# Patient Record
Sex: FEMALE | Race: BLACK OR AFRICAN AMERICAN | Employment: OTHER | URBAN - METROPOLITAN AREA
[De-identification: names, ages, dates, MRNs, and addresses within clinical notes are randomized per-mention and may not be internally consistent; named-entity substitution may affect disease eponyms.]

---

## 2017-10-12 ENCOUNTER — HOSPITAL ENCOUNTER (OUTPATIENT)
Dept: MAMMOGRAPHY | Age: 72
Discharge: HOME | End: 2017-10-12
Attending: FAMILY MEDICINE
Payer: MEDICARE

## 2017-10-12 DIAGNOSIS — Z12.31 VISIT FOR SCREENING MAMMOGRAM: ICD-10-CM

## 2017-10-12 PROCEDURE — 77067 SCR MAMMO BI INCL CAD: CPT

## 2018-11-07 ENCOUNTER — ANESTHESIA (OUTPATIENT)
Dept: ENDOSCOPY | Age: 73
End: 2018-11-07
Payer: MEDICARE

## 2018-11-07 ENCOUNTER — HOSPITAL ENCOUNTER (OUTPATIENT)
Age: 73
Setting detail: OUTPATIENT SURGERY
Discharge: HOME | End: 2018-11-07
Attending: INTERNAL MEDICINE | Admitting: INTERNAL MEDICINE
Payer: MEDICARE

## 2018-11-07 ENCOUNTER — ANESTHESIA EVENT (OUTPATIENT)
Dept: ENDOSCOPY | Age: 73
End: 2018-11-07
Payer: MEDICARE

## 2018-11-07 VITALS
RESPIRATION RATE: 21 BRPM | HEART RATE: 62 BPM | BODY MASS INDEX: 31.24 KG/M2 | HEIGHT: 55 IN | SYSTOLIC BLOOD PRESSURE: 116 MMHG | OXYGEN SATURATION: 100 % | TEMPERATURE: 97.9 F | WEIGHT: 135 LBS | DIASTOLIC BLOOD PRESSURE: 51 MMHG

## 2018-11-07 PROCEDURE — 74011250637 HC RX REV CODE- 250/637: Performed by: INTERNAL MEDICINE

## 2018-11-07 PROCEDURE — 74011250636 HC RX REV CODE- 250/636: Performed by: INTERNAL MEDICINE

## 2018-11-07 PROCEDURE — 74011250636 HC RX REV CODE- 250/636

## 2018-11-07 PROCEDURE — 76060000031 HC ANESTHESIA FIRST 0.5 HR: Performed by: INTERNAL MEDICINE

## 2018-11-07 PROCEDURE — 77030013992 HC SNR POLYP ENDOSC BSC -B: Performed by: INTERNAL MEDICINE

## 2018-11-07 PROCEDURE — 76040000019: Performed by: INTERNAL MEDICINE

## 2018-11-07 PROCEDURE — 88305 TISSUE EXAM BY PATHOLOGIST: CPT

## 2018-11-07 RX ORDER — LIDOCAINE HYDROCHLORIDE 20 MG/ML
INJECTION, SOLUTION EPIDURAL; INFILTRATION; INTRACAUDAL; PERINEURAL AS NEEDED
Status: DISCONTINUED | OUTPATIENT
Start: 2018-11-07 | End: 2018-11-07 | Stop reason: HOSPADM

## 2018-11-07 RX ORDER — NALOXONE HYDROCHLORIDE 0.4 MG/ML
0.4 INJECTION, SOLUTION INTRAMUSCULAR; INTRAVENOUS; SUBCUTANEOUS
Status: DISCONTINUED | OUTPATIENT
Start: 2018-11-07 | End: 2018-11-07 | Stop reason: HOSPADM

## 2018-11-07 RX ORDER — SODIUM CHLORIDE 0.9 % (FLUSH) 0.9 %
5-10 SYRINGE (ML) INJECTION EVERY 8 HOURS
Status: DISCONTINUED | OUTPATIENT
Start: 2018-11-07 | End: 2018-11-07 | Stop reason: HOSPADM

## 2018-11-07 RX ORDER — DEXTROMETHORPHAN/PSEUDOEPHED 2.5-7.5/.8
1.2 DROPS ORAL
Status: DISCONTINUED | OUTPATIENT
Start: 2018-11-07 | End: 2018-11-07 | Stop reason: HOSPADM

## 2018-11-07 RX ORDER — MIDAZOLAM HYDROCHLORIDE 1 MG/ML
.25-5 INJECTION, SOLUTION INTRAMUSCULAR; INTRAVENOUS
Status: CANCELLED | OUTPATIENT
Start: 2018-11-07 | End: 2018-11-07

## 2018-11-07 RX ORDER — FENTANYL CITRATE 50 UG/ML
50 INJECTION, SOLUTION INTRAMUSCULAR; INTRAVENOUS
Status: CANCELLED | OUTPATIENT
Start: 2018-11-07 | End: 2018-11-07

## 2018-11-07 RX ORDER — PROPOFOL 10 MG/ML
INJECTION, EMULSION INTRAVENOUS AS NEEDED
Status: DISCONTINUED | OUTPATIENT
Start: 2018-11-07 | End: 2018-11-07 | Stop reason: HOSPADM

## 2018-11-07 RX ORDER — NALOXONE HYDROCHLORIDE 0.4 MG/ML
0.4 INJECTION, SOLUTION INTRAMUSCULAR; INTRAVENOUS; SUBCUTANEOUS
Status: CANCELLED | OUTPATIENT
Start: 2018-11-07 | End: 2018-11-07

## 2018-11-07 RX ORDER — SODIUM CHLORIDE 9 MG/ML
75 INJECTION, SOLUTION INTRAVENOUS CONTINUOUS
Status: DISCONTINUED | OUTPATIENT
Start: 2018-11-07 | End: 2018-11-07 | Stop reason: HOSPADM

## 2018-11-07 RX ORDER — ATROPINE SULFATE 0.1 MG/ML
0.5 INJECTION INTRAVENOUS
Status: DISCONTINUED | OUTPATIENT
Start: 2018-11-07 | End: 2018-11-07 | Stop reason: HOSPADM

## 2018-11-07 RX ORDER — FLUMAZENIL 0.1 MG/ML
0.2 INJECTION INTRAVENOUS
Status: DISCONTINUED | OUTPATIENT
Start: 2018-11-07 | End: 2018-11-07 | Stop reason: HOSPADM

## 2018-11-07 RX ORDER — EPINEPHRINE 0.1 MG/ML
1 INJECTION INTRACARDIAC; INTRAVENOUS
Status: DISCONTINUED | OUTPATIENT
Start: 2018-11-07 | End: 2018-11-07 | Stop reason: HOSPADM

## 2018-11-07 RX ORDER — SODIUM CHLORIDE 0.9 % (FLUSH) 0.9 %
5-10 SYRINGE (ML) INJECTION AS NEEDED
Status: DISCONTINUED | OUTPATIENT
Start: 2018-11-07 | End: 2018-11-07 | Stop reason: HOSPADM

## 2018-11-07 RX ORDER — MIDAZOLAM HYDROCHLORIDE 1 MG/ML
.25-5 INJECTION, SOLUTION INTRAMUSCULAR; INTRAVENOUS
Status: DISCONTINUED | OUTPATIENT
Start: 2018-11-07 | End: 2018-11-07 | Stop reason: HOSPADM

## 2018-11-07 RX ORDER — FLUMAZENIL 0.1 MG/ML
0.2 INJECTION INTRAVENOUS
Status: CANCELLED | OUTPATIENT
Start: 2018-11-07 | End: 2018-11-07

## 2018-11-07 RX ADMIN — SIMETHICONE 80 MG: 20 SUSPENSION/ DROPS ORAL at 10:07

## 2018-11-07 RX ADMIN — SODIUM CHLORIDE 75 ML/HR: 900 INJECTION, SOLUTION INTRAVENOUS at 09:51

## 2018-11-07 RX ADMIN — PROPOFOL 200 MG: 10 INJECTION, EMULSION INTRAVENOUS at 10:15

## 2018-11-07 RX ADMIN — LIDOCAINE HYDROCHLORIDE 40 MG: 20 INJECTION, SOLUTION EPIDURAL; INFILTRATION; INTRACAUDAL; PERINEURAL at 09:53

## 2018-11-07 NOTE — H&P
Pre-endoscopy H and P The patient was seen and examined in the room/pre-op holding area. The airway was assessed and documented. The problem list, past medical history, and medications were reviewed. Patient Active Problem List  
Diagnosis Code  
 HTN (hypertension) I10  
 Depressive disorder, not elsewhere classified F32.9  PVD (peripheral vascular disease) ([facility]) I73.9  Nausea with vomiting R11.2  
 HTN (hypertension) I10  
 Chronic hyponatremia E87.1  Sinus bradycardia R00.1  MRSA infection within last 3 months Z86.14  
 Status post skin graft Z94.5 Social History Socioeconomic History  Marital status:  Spouse name: Not on file  Number of children: Not on file  Years of education: Not on file  Highest education level: Not on file Social Needs  Financial resource strain: Not on file  Food insecurity - worry: Not on file  Food insecurity - inability: Not on file  Transportation needs - medical: Not on file  Transportation needs - non-medical: Not on file Occupational History  Not on file Tobacco Use  Smoking status: Former Smoker Last attempt to quit: 7/16/2008 Years since quitting: 10.3  Smokeless tobacco: Never Used Substance and Sexual Activity  Alcohol use: No  
 Drug use: No  
 Sexual activity: Not on file Other Topics Concern  Not on file Social History Narrative  Not on file Past Medical History:  
Diagnosis Date  Arthritis   
 was in legs  Hypertension  Other ill-defined conditions(799.89)   
 extremety vascular disease  PAD [facility]peripheral artery disease) [facility]  PVD (peripheral vascular disease) ([facility] [facility] Prior to Admission Medications Prescriptions Last Dose Informant Patient Reported? Taking? GABAPENTIN (NEURONTIN PO) 11/6/2018 at Unknown time  Yes Yes Sig: Take 600 mg by mouth three (3) times daily. HYDROmorphone (DILAUDID) 4 mg tablet 11/6/2018 at Unknown time  No Yes Sig: Take 1 Tab by mouth every four (4) hours as needed for Pain. amlodipine (NORVASC) 10 mg tablet 11/6/2018 at Unknown time  No Yes Sig: Take 1 Tab by mouth daily. ferrous sulfate 325 mg (65 mg iron) tablet 11/6/2018 at Unknown time  Yes Yes Sig: Take 325 mg by mouth two (2) times daily (with meals). hydrALAZINE (APRESOLINE) 25 mg tablet 11/6/2018 at Unknown time  Yes Yes Sig: Take 25 mg by mouth two (2) times a day. hydrochlorothiazide (HYDRODIURIL) 25 mg tablet 11/6/2018 at Unknown time  No Yes Sig: Take 1 Tab by mouth daily. lisinopril (PRINIVIL, ZESTRIL) 40 mg tablet 11/6/2018 at Unknown time  Yes Yes Sig: Take 40 mg by mouth daily. potassium chloride (K-DUR) 20 mEq tablet 11/6/2018 at Unknown time  No Yes Sig: Take 1 Tab by mouth daily. Facility-Administered Medications: None The review of systems is:  Negative  for shortness of breath or chest pain The heart, lungs, and mental status were satisfactory for the administration of deep sedation and for the procedure. I discussed with the patient the objectives, risks, consequences and alternatives to the procedure.    
 
Collins Arias MD 
11/7/2018 
9:41 AM

## 2018-11-07 NOTE — ANESTHESIA POSTPROCEDURE EVALUATION
Procedure(s): 
COLONOSCOPY 
ENDOSCOPIC POLYPECTOMY. Anesthesia Post Evaluation Patient location during evaluation: PACU Note status: Adequate. Level of consciousness: responsive to verbal stimuli and sleepy but conscious Pain management: satisfactory to patient Airway patency: patent Anesthetic complications: no 
Cardiovascular status: acceptable Respiratory status: acceptable Hydration status: acceptable Comments: +Post-Anesthesia Evaluation and Assessment Patient: Jose Mccray MRN: [id number]  SSN: [id number] YOB: 1945  Age: 68 y.o. Sex: female Cardiovascular Function/Vital Signs /52   Pulse (!) 0   Temp 36.6 °C (97.9 °F)   Resp 14   Ht 4' 2\" (1.27 m)   Wt 61.2 kg (135 lb)   SpO2 100%   BMI 37.97 kg/m² Patient is status post Procedure(s): 
COLONOSCOPY 
ENDOSCOPIC POLYPECTOMY. Nausea/Vomiting: Controlled. Postoperative hydration reviewed and adequate. Pain: 
Pain Scale 1: Numeric (0 - 10) (11/07/18 1027) Pain Intensity 1: 0 (11/07/18 1027) Managed. Neurological Status: At baseline. Mental Status and Level of Consciousness: Arousable. Pulmonary Status:  
O2 Device: Room air (11/07/18 1037) Adequate oxygenation and airway patent. Complications related to anesthesia: None Post-anesthesia assessment completed. No concerns. Signed By: Nerissa Churchill MD  
 11/7/2018 Visit Vitals /52 Pulse (!) 0 Temp 36.6 °C (97.9 °F) Resp 14 Ht 4' 2\" (1.27 m) Wt 61.2 kg (135 lb) SpO2 100% BMI 37.97 kg/m²

## 2018-11-07 NOTE — PROCEDURES
Colonoscopy Procedure Note    Morales Ohms  1945  [id number]    Indications:  Please see below. Pre-operative Diagnosis: SCREENING    Post-operative Diagnosis: Sigmoid colon polyps, internal hemorhoids      : Vidal Multani. Alee Marie MD    Referring Provider: Beverley Llanos MD    Sedation:  MAC anesthesia Propofol        Procedure Details:    After detailed informed consent was obtained with all risks and benefits of procedure explained and preoperative exam completed, the patient was taken to the endoscopy suite and placed in the left lateral decubitus position. Upon sequential sedation as per above, a digital rectal exam was performed  And was normal.  The Olympus videocolonoscope  was inserted in the rectum and carefully advanced to the cecum, which was identified by the ileocecal valve and appendiceal orifice. The quality of preparation was fair. The colonoscope was slowly withdrawn with careful evaluation between folds. Retroflexion in the rectum was performed. Findings:   · Four sessile, 5-7 mm, erythematous looking polyps are removed with a cold snare from the sigmoid colon. · Mucous coating of some areas noted. · Cecum appears normal.  · There are small internal hemorrhoids with hypertrophic  anal papillae. Therapies:  4 complete polypectomy were performed using cold snare  and the polyps were  retrieved    Specimen:  Specimens were collected as described above and sent to pathology. Complications: None were encountered during the procedure. EBL:  None. Recommendations:     -Await pathology. -If adenoma is present, repeat colonoscopy in 3 years.  -High fiber diet. Naturally, for new bleeding, unexplained weight loss,change in bowel habits and anemia, an earlier colonoscopy should be considered. Benito Marie MD  11/7/2018  10:15 AM

## 2018-11-07 NOTE — DISCHARGE INSTRUCTIONS
[place] Office: [phone]    [facility]  [id number]  1945    EGD/COLONOSCOPY DISCHARGE INSTRUCTIONS  Discomfort:    redness at IV site- apply warm compress to area; if redness or soreness persist- contact your physician  Gaseous discomfort- walking, belching will help relieve any discomfort  You may not operate a vehicle for 12 hours  You may not engage in an occupation involving machinery or appliances for rest of today. You may not drink alcoholic beverages for at least 12 hours  Avoid making any critical decisions for at least 24 hour  DIET  You may resume your regular diet - however -  remember your colon is empty and a heavy meal will produce gas. Avoid these foods:  fried / greasy foods, excessive carbonated drinks or too much caffeine  MEDICATIONS   Regarding Aspirin or Nonsteroidal medications specifically, please see below. ACTIVITY  You may resume your normal daily activities. Spend the remainder of the day resting -  avoid any strenuous activity. CALL M.D. ANY SIGN OF   Increasing pain, nausea, vomiting  Abdominal distension (swelling)  New increased bleeding (oral or rectal)  Fever (chills)    You may not take any Advil, Aspirin, Ibuprofen, Motrin, Aleve, or Goodys for 7 days, ONLY  Tylenol as needed for pain. Follow-up Instructions:   Call  Benito Marie MD for any questions or concerns  Results of procedure / biopsy in 7 days   Telephone # [phone]      Follow-up Information    None

## 2018-11-07 NOTE — ROUTINE PROCESS
Heavenly Benjamin 
1945 
[id number] Situation: 
Verbal report received from: Taniya Lee Procedure: Procedure(s): 
COLONOSCOPY 
ENDOSCOPIC POLYPECTOMY Background: 
 
Preoperative diagnosis: SCREENING Postoperative diagnosis: Colon polyps, hemorrhoids :  Dr. Jenny Oliveira 
Assistant(s): Endoscopy Technician-1: Lalo Irving 
Endoscopy RN-1: Danuta Yun RN Specimens:  
ID Type Source Tests Collected by Time Destination 1 : Sigmoid colon polyp Preservative Sigmoid  Christophe Melo MD 11/7/2018 1004 Pathology H. Pylori  no Assessment: 
Intra-procedure medications Anesthesia gave intra-procedure sedation and medications, see anesthesia flow sheet yes Intravenous fluids: NS@ Sukumar Yaima Vital signs stable Abdominal assessment: round and soft Recommendation: 
Discharge patient per MD order. Family or Friend Liezth- daughter in law Permission to share finding with family or friend yes

## 2018-11-07 NOTE — ANESTHESIA PREPROCEDURE EVALUATION
Anesthetic History No history of anesthetic complications PONV Review of Systems / Medical History Patient summary reviewed, nursing notes reviewed and pertinent labs reviewed Pulmonary Within defined limits Neuro/Psych Within defined limits Psychiatric history Cardiovascular Within defined limits Hypertension Dysrhythmias PAD Exercise tolerance: >4 METS Comments: Hx SB  
GI/Hepatic/Renal 
Within defined limits Endo/Other Within defined limits Arthritis Other Findings Physical Exam 
 
Airway Mallampati: II 
TM Distance: 4 - 6 cm Neck ROM: normal range of motion Mouth opening: Normal 
 
 Cardiovascular Regular rate and rhythm,  S1 and S2 normal,  no murmur, click, rub, or gallop Dental 
 
Dentition: Full upper dentures Pulmonary Breath sounds clear to auscultation Abdominal 
GI exam deferred Other Findings Anesthetic Plan ASA: 3 Anesthesia type: general and total IV anesthesia Induction: Intravenous Anesthetic plan and risks discussed with: Patient Propofol MAC

## 2018-11-07 NOTE — PERIOP NOTES
Endoscope was pre-cleaned at the bedside immediately following procedure by [facility] ET.

## 2018-11-07 NOTE — PERIOP NOTES
Anesthesia reports 200mg Propofol, 40mg Lidocaine and 300mL NS given during procedure. Received report from anesthesia staff on vital signs and status of patient.

## 2019-09-12 ENCOUNTER — HOSPITAL ENCOUNTER (OUTPATIENT)
Dept: MAMMOGRAPHY | Age: 74
Discharge: HOME | End: 2019-09-12
Attending: FAMILY MEDICINE
Payer: MEDICARE

## 2019-09-12 DIAGNOSIS — Z12.39 BREAST SCREENING, UNSPECIFIED: ICD-10-CM

## 2019-09-12 PROCEDURE — 77067 SCR MAMMO BI INCL CAD: CPT

## 2020-05-08 ENCOUNTER — APPOINTMENT (OUTPATIENT)
Dept: GENERAL RADIOLOGY | Age: 75
DRG: 246 | End: 2020-05-08
Attending: EMERGENCY MEDICINE
Payer: MEDICARE

## 2020-05-08 ENCOUNTER — HOSPITAL ENCOUNTER (INPATIENT)
Age: 75
LOS: 3 days | Discharge: HOME | DRG: 246 | End: 2020-05-11
Attending: EMERGENCY MEDICINE | Admitting: INTERNAL MEDICINE
Payer: MEDICARE

## 2020-05-08 ENCOUNTER — APPOINTMENT (OUTPATIENT)
Dept: CT IMAGING | Age: 75
DRG: 246 | End: 2020-05-08
Attending: EMERGENCY MEDICINE
Payer: MEDICARE

## 2020-05-08 ENCOUNTER — APPOINTMENT (OUTPATIENT)
Dept: NON INVASIVE DIAGNOSTICS | Age: 75
DRG: 246 | End: 2020-05-08
Attending: INTERNAL MEDICINE
Payer: MEDICARE

## 2020-05-08 DIAGNOSIS — I21.09 ST ELEVATION MYOCARDIAL INFARCTION (STEMI) OF ANTERIOR WALL (HCC): ICD-10-CM

## 2020-05-08 DIAGNOSIS — I21.3 ST ELEVATION MYOCARDIAL INFARCTION (STEMI), UNSPECIFIED ARTERY (HCC): Primary | ICD-10-CM

## 2020-05-08 PROBLEM — I21.02 STEMI INVOLVING LEFT ANTERIOR DESCENDING CORONARY ARTERY (HCC): Status: ACTIVE | Noted: 2020-05-08

## 2020-05-08 LAB
ALBUMIN SERPL-MCNC: 3.6 G/DL (ref 3.5–5)
ALBUMIN/GLOB SERPL: 0.9 {RATIO} (ref 1.1–2.2)
ALP SERPL-CCNC: 60 U/L (ref 45–117)
ALT SERPL-CCNC: 36 U/L (ref 12–78)
ANION GAP SERPL CALC-SCNC: 10 MMOL/L (ref 5–15)
ARTERIAL PATENCY WRIST A: YES
AST SERPL-CCNC: 46 U/L (ref 15–37)
ATRIAL RATE: 96 BPM
AV VELOCITY RATIO: 0.78
BASE DEFICIT BLD-SCNC: 5 MMOL/L
BASOPHILS # BLD: 0 K/UL (ref 0–0.1)
BASOPHILS # BLD: 0 K/UL (ref 0–0.1)
BASOPHILS NFR BLD: 0 % (ref 0–1)
BASOPHILS NFR BLD: 0 % (ref 0–1)
BDY SITE: ABNORMAL
BILIRUB SERPL-MCNC: 0.4 MG/DL (ref 0.2–1)
BNP SERPL-MCNC: ABNORMAL PG/ML
BUN SERPL-MCNC: 28 MG/DL (ref 6–20)
BUN/CREAT SERPL: 23 (ref 12–20)
CA-I BLD-SCNC: 1.24 MMOL/L (ref 1.12–1.32)
CALCIUM SERPL-MCNC: 8.9 MG/DL (ref 8.5–10.1)
CALCULATED R AXIS, ECG10: -48 DEGREES
CALCULATED T AXIS, ECG11: 117 DEGREES
CHLORIDE SERPL-SCNC: 97 MMOL/L (ref 97–108)
CK MB CFR SERPL CALC: 3.6 % (ref 0–2.5)
CK MB SERPL-MCNC: 18.8 NG/ML (ref 5–25)
CK SERPL-CCNC: 518 U/L (ref 26–192)
CO2 SERPL-SCNC: 22 MMOL/L (ref 21–32)
COMMENT, HOLDF: NORMAL
CREAT SERPL-MCNC: 1.21 MG/DL (ref 0.55–1.02)
D DIMER PPP FEU-MCNC: 1.13 MG/L FEU (ref 0–0.65)
DIAGNOSIS, 93000: NORMAL
DIFFERENTIAL METHOD BLD: ABNORMAL
DIFFERENTIAL METHOD BLD: ABNORMAL
ECHO AV PEAK GRADIENT: 7.7 MMHG
ECHO AV PEAK VELOCITY: 138.72 CM/S
ECHO EST RA PRESSURE: 10 MMHG
ECHO LA AREA 4C: 20.6 CM2
ECHO LA MAJOR AXIS: 3.32 CM
ECHO LA VOL 2C: 83.19 ML (ref 22–52)
ECHO LA VOL 4C: 61.32 ML (ref 22–52)
ECHO LA VOL BP: 76.85 ML (ref 22–52)
ECHO LA VOL/BSA BIPLANE: 55.55 ML/M2 (ref 16–28)
ECHO LA VOLUME INDEX A2C: 60.13 ML/M2 (ref 16–28)
ECHO LA VOLUME INDEX A4C: 44.32 ML/M2 (ref 16–28)
ECHO LV EDV A2C: 107.7 ML
ECHO LV EDV A4C: 98.6 ML
ECHO LV EDV BP: 103.6 ML (ref 56–104)
ECHO LV EDV INDEX A4C: 71.3 ML/M2
ECHO LV EDV INDEX BP: 74.9 ML/M2
ECHO LV EDV NDEX A2C: 77.8 ML/M2
ECHO LV EDV TEICHHOLZ: 49.88 ML
ECHO LV EJECTION FRACTION A2C: 19 %
ECHO LV EJECTION FRACTION A4C: 35 %
ECHO LV EJECTION FRACTION BIPLANE: 27.5 % (ref 55–100)
ECHO LV ESV A2C: 87.1 ML
ECHO LV ESV A4C: 64.1 ML
ECHO LV ESV BP: 75.1 ML (ref 19–49)
ECHO LV ESV INDEX A2C: 63 ML/M2
ECHO LV ESV INDEX A4C: 46.3 ML/M2
ECHO LV ESV INDEX BP: 54.3 ML/M2
ECHO LV ESV TEICHHOLZ: 33.03 ML
ECHO LV INTERNAL DIMENSION DIASTOLIC MMODE: 4.28 CM
ECHO LV INTERNAL DIMENSION DIASTOLIC: 4.27 CM (ref 3.9–5.3)
ECHO LV INTERNAL DIMENSION SYSTOLIC MMODE: 3.57 CM
ECHO LV INTERNAL DIMENSION SYSTOLIC: 3.59 CM
ECHO LV IVSD MMODE: 1.49 CM
ECHO LV IVSD: 1.51 CM (ref 0.6–0.9)
ECHO LV IVSS MMODE: 1.72 CM
ECHO LV IVSS: 1.73 CM
ECHO LV MASS 2D: 313.8 G (ref 67–162)
ECHO LV MASS INDEX 2D: 226.8 G/M2 (ref 43–95)
ECHO LV POSTERIOR WALL DIASTOLIC MMODE: 1.52 CM
ECHO LV POSTERIOR WALL DIASTOLIC: 1.54 CM (ref 0.6–0.9)
ECHO LV POSTERIOR WALL SYSTOLIC: 1.68 CM
ECHO LVOT PEAK GRADIENT: 4.7 MMHG
ECHO LVOT PEAK VELOCITY: 108.07 CM/S
ECHO MV A VELOCITY: 74.5 CM/S
ECHO MV E DECELERATION TIME (DT): 106.8 MS
ECHO MV E VELOCITY: 93.35 CM/S
ECHO MV E/A RATIO: 1.25
ECHO MV REGURGITANT PEAK GRADIENT: 70.2 MMHG
ECHO MV REGURGITANT PEAK VELOCITY: 418.82 CM/S
ECHO PULMONARY ARTERY SYSTOLIC PRESSURE (PASP): 32 MMHG
ECHO RIGHT VENTRICULAR SYSTOLIC PRESSURE (RVSP): 32 MMHG
ECHO RV INTERNAL DIMENSION: 3.98 CM
ECHO TV REGURGITANT MAX VELOCITY: 234.48 CM/S
ECHO TV REGURGITANT PEAK GRADIENT: 22 MMHG
EOSINOPHIL # BLD: 0 K/UL (ref 0–0.4)
EOSINOPHIL # BLD: 0 K/UL (ref 0–0.4)
EOSINOPHIL NFR BLD: 0 % (ref 0–7)
EOSINOPHIL NFR BLD: 0 % (ref 0–7)
ERYTHROCYTE [DISTWIDTH] IN BLOOD BY AUTOMATED COUNT: 13.7 % (ref 11.5–14.5)
ERYTHROCYTE [DISTWIDTH] IN BLOOD BY AUTOMATED COUNT: 14.1 % (ref 11.5–14.5)
GAS FLOW.O2 O2 DELIVERY SYS: ABNORMAL L/MIN
GAS FLOW.O2 SETTING OXYMISER: 4 L/M
GLOBULIN SER CALC-MCNC: 4.1 G/DL (ref 2–4)
GLUCOSE BLD STRIP.AUTO-MCNC: 152 MG/DL (ref 65–100)
GLUCOSE SERPL-MCNC: 150 MG/DL (ref 65–100)
HCO3 BLD-SCNC: 21.9 MMOL/L (ref 22–26)
HCT VFR BLD AUTO: 27.1 % (ref 35–47)
HCT VFR BLD AUTO: 31.2 % (ref 35–47)
HGB BLD-MCNC: 10.5 G/DL (ref 11.5–16)
HGB BLD-MCNC: 9.3 G/DL (ref 11.5–16)
IMM GRANULOCYTES # BLD AUTO: 0 K/UL (ref 0–0.04)
IMM GRANULOCYTES # BLD AUTO: 0.1 K/UL (ref 0–0.04)
IMM GRANULOCYTES NFR BLD AUTO: 0 % (ref 0–0.5)
IMM GRANULOCYTES NFR BLD AUTO: 1 % (ref 0–0.5)
LACTATE SERPL-SCNC: 3.4 MMOL/L (ref 0.4–2)
LVFS 2D: 15.9 %
LVFS: 16.67 %
LVSV (MOD BI): 17.43 ML
LVSV (MOD SINGLE 4C): 21.05 ML
LVSV (MOD SINGLE): 12.59 ML
LVSV (TEICH): 16.85 ML
LYMPHOCYTES # BLD: 0.4 K/UL (ref 0.8–3.5)
LYMPHOCYTES # BLD: 1.2 K/UL (ref 0.8–3.5)
LYMPHOCYTES NFR BLD: 10 % (ref 12–49)
LYMPHOCYTES NFR BLD: 8 % (ref 12–49)
MAGNESIUM SERPL-MCNC: 1.8 MG/DL (ref 1.6–2.4)
MCH RBC QN AUTO: 24.3 PG (ref 26–34)
MCH RBC QN AUTO: 24.8 PG (ref 26–34)
MCHC RBC AUTO-ENTMCNC: 33.7 G/DL (ref 30–36.5)
MCHC RBC AUTO-ENTMCNC: 34.3 G/DL (ref 30–36.5)
MCV RBC AUTO: 70.9 FL (ref 80–99)
MCV RBC AUTO: 73.6 FL (ref 80–99)
MONOCYTES # BLD: 0.3 K/UL (ref 0–1)
MONOCYTES # BLD: 1.3 K/UL (ref 0–1)
MONOCYTES NFR BLD: 11 % (ref 5–13)
MONOCYTES NFR BLD: 5 % (ref 5–13)
MV DEC SLOPE: 8.74
NEUTS SEG # BLD: 4.8 K/UL (ref 1.8–8)
NEUTS SEG # BLD: 9.4 K/UL (ref 1.8–8)
NEUTS SEG NFR BLD: 78 % (ref 32–75)
NEUTS SEG NFR BLD: 87 % (ref 32–75)
NRBC # BLD: 0 K/UL (ref 0–0.01)
NRBC # BLD: 0 K/UL (ref 0–0.01)
NRBC BLD-RTO: 0 PER 100 WBC
NRBC BLD-RTO: 0 PER 100 WBC
P-R INTERVAL, ECG05: 156 MS
PCO2 BLD: 49.4 MMHG (ref 35–45)
PH BLD: 7.25 [PH] (ref 7.35–7.45)
PLATELET # BLD AUTO: 234 K/UL (ref 150–400)
PLATELET # BLD AUTO: 296 K/UL (ref 150–400)
PMV BLD AUTO: 11.1 FL (ref 8.9–12.9)
PMV BLD AUTO: 11.3 FL (ref 8.9–12.9)
PO2 BLD: 31 MMHG (ref 80–100)
POTASSIUM SERPL-SCNC: 4.4 MMOL/L (ref 3.5–5.1)
PROT SERPL-MCNC: 7.7 G/DL (ref 6.4–8.2)
PULMONARY ARTERY END DIASTOLIC PRESSURE: 16.4 MMHG
PULMONARY ARTERY MEAN PRESURE: 21.6 MMHG
PV END DIASTOLIC VELOCITY: 1.3 MMHG
Q-T INTERVAL, ECG07: 364 MS
QRS DURATION, ECG06: 100 MS
QTC CALCULATION (BEZET), ECG08: 459 MS
RBC # BLD AUTO: 3.82 M/UL (ref 3.8–5.2)
RBC # BLD AUTO: 4.24 M/UL (ref 3.8–5.2)
RBC MORPH BLD: ABNORMAL
SAMPLES BEING HELD,HOLD: NORMAL
SAO2 % BLD: 50 % (ref 92–97)
SERVICE CMNT-IMP: ABNORMAL
SODIUM SERPL-SCNC: 129 MMOL/L (ref 136–145)
SPECIMEN TYPE: ABNORMAL
TOTAL RESP. RATE, ITRR: 24
TROPONIN I SERPL-MCNC: 1.61 NG/ML
TROPONIN I SERPL-MCNC: 12.9 NG/ML
VENTRICULAR RATE, ECG03: 96 BPM
WBC # BLD AUTO: 12 K/UL (ref 3.6–11)
WBC # BLD AUTO: 5.5 K/UL (ref 3.6–11)

## 2020-05-08 PROCEDURE — C1769 GUIDE WIRE: HCPCS | Performed by: INTERNAL MEDICINE

## 2020-05-08 PROCEDURE — C1894 INTRO/SHEATH, NON-LASER: HCPCS | Performed by: INTERNAL MEDICINE

## 2020-05-08 PROCEDURE — 4A023N7 MEASUREMENT OF CARDIAC SAMPLING AND PRESSURE, LEFT HEART, PERCUTANEOUS APPROACH: ICD-10-PCS | Performed by: INTERNAL MEDICINE

## 2020-05-08 PROCEDURE — 77030008543 HC TBNG MON PRSS MRTM -A: Performed by: INTERNAL MEDICINE

## 2020-05-08 PROCEDURE — B2151ZZ FLUOROSCOPY OF LEFT HEART USING LOW OSMOLAR CONTRAST: ICD-10-PCS | Performed by: INTERNAL MEDICINE

## 2020-05-08 PROCEDURE — 87635 SARS-COV-2 COVID-19 AMP PRB: CPT

## 2020-05-08 PROCEDURE — 71045 X-RAY EXAM CHEST 1 VIEW: CPT

## 2020-05-08 PROCEDURE — 77030013797 HC KT TRNSDUC PRSSR EDWD -A: Performed by: INTERNAL MEDICINE

## 2020-05-08 PROCEDURE — 74011250636 HC RX REV CODE- 250/636

## 2020-05-08 PROCEDURE — 77030019569 HC BND COMPR RAD TERU -B: Performed by: INTERNAL MEDICINE

## 2020-05-08 PROCEDURE — 96374 THER/PROPH/DIAG INJ IV PUSH: CPT

## 2020-05-08 PROCEDURE — 74011000250 HC RX REV CODE- 250: Performed by: INTERNAL MEDICINE

## 2020-05-08 PROCEDURE — 87040 BLOOD CULTURE FOR BACTERIA: CPT

## 2020-05-08 PROCEDURE — C1874 STENT, COATED/COV W/DEL SYS: HCPCS | Performed by: INTERNAL MEDICINE

## 2020-05-08 PROCEDURE — 77030019698 HC SYR ANGI MDLON MRTM -A: Performed by: INTERNAL MEDICINE

## 2020-05-08 PROCEDURE — 93005 ELECTROCARDIOGRAM TRACING: CPT

## 2020-05-08 PROCEDURE — 99152 MOD SED SAME PHYS/QHP 5/>YRS: CPT | Performed by: INTERNAL MEDICINE

## 2020-05-08 PROCEDURE — 94761 N-INVAS EAR/PLS OXIMETRY MLT: CPT

## 2020-05-08 PROCEDURE — C1887 CATHETER, GUIDING: HCPCS | Performed by: INTERNAL MEDICINE

## 2020-05-08 PROCEDURE — B2111ZZ FLUOROSCOPY OF MULTIPLE CORONARY ARTERIES USING LOW OSMOLAR CONTRAST: ICD-10-PCS | Performed by: INTERNAL MEDICINE

## 2020-05-08 PROCEDURE — 84484 ASSAY OF TROPONIN QUANT: CPT

## 2020-05-08 PROCEDURE — 82962 GLUCOSE BLOOD TEST: CPT

## 2020-05-08 PROCEDURE — 74011000258 HC RX REV CODE- 258: Performed by: INTERNAL MEDICINE

## 2020-05-08 PROCEDURE — C1725 CATH, TRANSLUMIN NON-LASER: HCPCS | Performed by: INTERNAL MEDICINE

## 2020-05-08 PROCEDURE — 74011250636 HC RX REV CODE- 250/636: Performed by: EMERGENCY MEDICINE

## 2020-05-08 PROCEDURE — 65620000000 HC RM CCU GENERAL

## 2020-05-08 PROCEDURE — 82803 BLOOD GASES ANY COMBINATION: CPT

## 2020-05-08 PROCEDURE — 93306 TTE W/DOPPLER COMPLETE: CPT

## 2020-05-08 PROCEDURE — 96375 TX/PRO/DX INJ NEW DRUG ADDON: CPT

## 2020-05-08 PROCEDURE — 74011250636 HC RX REV CODE- 250/636: Performed by: INTERNAL MEDICINE

## 2020-05-08 PROCEDURE — 71275 CT ANGIOGRAPHY CHEST: CPT

## 2020-05-08 PROCEDURE — 99153 MOD SED SAME PHYS/QHP EA: CPT | Performed by: INTERNAL MEDICINE

## 2020-05-08 PROCEDURE — 77030004549 HC CATH ANGI DX PRF MRTM -A: Performed by: INTERNAL MEDICINE

## 2020-05-08 PROCEDURE — 36415 COLL VENOUS BLD VENIPUNCTURE: CPT

## 2020-05-08 PROCEDURE — 80053 COMPREHEN METABOLIC PANEL: CPT

## 2020-05-08 PROCEDURE — 74011636320 HC RX REV CODE- 636/320: Performed by: EMERGENCY MEDICINE

## 2020-05-08 PROCEDURE — 74011250637 HC RX REV CODE- 250/637: Performed by: INTERNAL MEDICINE

## 2020-05-08 PROCEDURE — 83735 ASSAY OF MAGNESIUM: CPT

## 2020-05-08 PROCEDURE — 83605 ASSAY OF LACTIC ACID: CPT

## 2020-05-08 PROCEDURE — 99285 EMERGENCY DEPT VISIT HI MDM: CPT

## 2020-05-08 PROCEDURE — 77030002996 HC SUT SLK J&J -A: Performed by: INTERNAL MEDICINE

## 2020-05-08 PROCEDURE — 92928 PRQ TCAT PLMT NTRAC ST 1 LES: CPT | Performed by: INTERNAL MEDICINE

## 2020-05-08 PROCEDURE — 93458 L HRT ARTERY/VENTRICLE ANGIO: CPT | Performed by: INTERNAL MEDICINE

## 2020-05-08 PROCEDURE — 85379 FIBRIN DEGRADATION QUANT: CPT

## 2020-05-08 PROCEDURE — 82550 ASSAY OF CK (CPK): CPT

## 2020-05-08 PROCEDURE — 74011250637 HC RX REV CODE- 250/637: Performed by: EMERGENCY MEDICINE

## 2020-05-08 PROCEDURE — 82553 CREATINE MB FRACTION: CPT

## 2020-05-08 PROCEDURE — 85347 COAGULATION TIME ACTIVATED: CPT

## 2020-05-08 PROCEDURE — 85025 COMPLETE CBC W/AUTO DIFF WBC: CPT

## 2020-05-08 PROCEDURE — 74011636320 HC RX REV CODE- 636/320: Performed by: INTERNAL MEDICINE

## 2020-05-08 PROCEDURE — 027236Z DILATION OF CORONARY ARTERY, THREE ARTERIES WITH THREE DRUG-ELUTING INTRALUMINAL DEVICES, PERCUTANEOUS APPROACH: ICD-10-PCS | Performed by: INTERNAL MEDICINE

## 2020-05-08 PROCEDURE — 77030030195 HC CATH ANGI DX PRF4 MRTM -A: Performed by: INTERNAL MEDICINE

## 2020-05-08 PROCEDURE — 83880 ASSAY OF NATRIURETIC PEPTIDE: CPT

## 2020-05-08 PROCEDURE — 36600 WITHDRAWAL OF ARTERIAL BLOOD: CPT

## 2020-05-08 DEVICE — STENT RONYX27512UX RESOLUTE ONYX 2.75X12
Type: IMPLANTABLE DEVICE | Status: FUNCTIONAL
Brand: RESOLUTE ONYX™

## 2020-05-08 DEVICE — STENT RONYX20008UX RESOLUTE ONYX 2.00X08
Type: IMPLANTABLE DEVICE | Status: FUNCTIONAL
Brand: RESOLUTE ONYX™

## 2020-05-08 DEVICE — STENT RONYX25030UX RESOLUTE ONYX 2.50X30
Type: IMPLANTABLE DEVICE | Status: FUNCTIONAL
Brand: RESOLUTE ONYX™

## 2020-05-08 DEVICE — STENT RONYX25012UX RESOLUTE ONYX 2.50X12
Type: IMPLANTABLE DEVICE | Status: FUNCTIONAL
Brand: RESOLUTE ONYX™

## 2020-05-08 RX ORDER — GABAPENTIN 300 MG/1
600 CAPSULE ORAL EVERY 8 HOURS
Status: DISCONTINUED | OUTPATIENT
Start: 2020-05-08 | End: 2020-05-11 | Stop reason: HOSPADM

## 2020-05-08 RX ORDER — VERAPAMIL HYDROCHLORIDE 2.5 MG/ML
INJECTION, SOLUTION INTRAVENOUS AS NEEDED
Status: DISCONTINUED | OUTPATIENT
Start: 2020-05-08 | End: 2020-05-08 | Stop reason: HOSPADM

## 2020-05-08 RX ORDER — HEPARIN SODIUM 1000 [USP'U]/ML
INJECTION, SOLUTION INTRAVENOUS; SUBCUTANEOUS AS NEEDED
Status: DISCONTINUED | OUTPATIENT
Start: 2020-05-08 | End: 2020-05-08 | Stop reason: HOSPADM

## 2020-05-08 RX ORDER — ACETAMINOPHEN 650 MG/1
650 SUPPOSITORY RECTAL
Status: DISCONTINUED | OUTPATIENT
Start: 2020-05-08 | End: 2020-05-08

## 2020-05-08 RX ORDER — ZOLPIDEM TARTRATE 5 MG/1
5 TABLET ORAL
Status: DISCONTINUED | OUTPATIENT
Start: 2020-05-08 | End: 2020-05-11 | Stop reason: HOSPADM

## 2020-05-08 RX ORDER — MIDAZOLAM HYDROCHLORIDE 1 MG/ML
INJECTION, SOLUTION INTRAMUSCULAR; INTRAVENOUS AS NEEDED
Status: DISCONTINUED | OUTPATIENT
Start: 2020-05-08 | End: 2020-05-08 | Stop reason: HOSPADM

## 2020-05-08 RX ORDER — ATORVASTATIN CALCIUM 40 MG/1
40 TABLET, FILM COATED ORAL DAILY
COMMUNITY

## 2020-05-08 RX ORDER — MORPHINE SULFATE 2 MG/ML
4 INJECTION, SOLUTION INTRAMUSCULAR; INTRAVENOUS
Status: COMPLETED | OUTPATIENT
Start: 2020-05-08 | End: 2020-05-08

## 2020-05-08 RX ORDER — CLOPIDOGREL BISULFATE 75 MG/1
75 TABLET ORAL DAILY
Status: DISCONTINUED | OUTPATIENT
Start: 2020-05-09 | End: 2020-05-11 | Stop reason: HOSPADM

## 2020-05-08 RX ORDER — HEPARIN SODIUM 200 [USP'U]/100ML
INJECTION, SOLUTION INTRAVENOUS
Status: COMPLETED | OUTPATIENT
Start: 2020-05-08 | End: 2020-05-08

## 2020-05-08 RX ORDER — SODIUM CHLORIDE 0.9 % (FLUSH) 0.9 %
5-40 SYRINGE (ML) INJECTION AS NEEDED
Status: DISCONTINUED | OUTPATIENT
Start: 2020-05-08 | End: 2020-05-11 | Stop reason: HOSPADM

## 2020-05-08 RX ORDER — FENTANYL CITRATE 50 UG/ML
INJECTION, SOLUTION INTRAMUSCULAR; INTRAVENOUS AS NEEDED
Status: DISCONTINUED | OUTPATIENT
Start: 2020-05-08 | End: 2020-05-08 | Stop reason: HOSPADM

## 2020-05-08 RX ORDER — SODIUM CHLORIDE 0.9 % (FLUSH) 0.9 %
5-40 SYRINGE (ML) INJECTION EVERY 8 HOURS
Status: DISCONTINUED | OUTPATIENT
Start: 2020-05-08 | End: 2020-05-11 | Stop reason: HOSPADM

## 2020-05-08 RX ORDER — LIDOCAINE HYDROCHLORIDE 10 MG/ML
INJECTION, SOLUTION EPIDURAL; INFILTRATION; INTRACAUDAL; PERINEURAL AS NEEDED
Status: DISCONTINUED | OUTPATIENT
Start: 2020-05-08 | End: 2020-05-08 | Stop reason: HOSPADM

## 2020-05-08 RX ORDER — LORAZEPAM 1 MG/1
1 TABLET ORAL
Status: DISCONTINUED | OUTPATIENT
Start: 2020-05-08 | End: 2020-05-11 | Stop reason: HOSPADM

## 2020-05-08 RX ORDER — HEPARIN SODIUM 5000 [USP'U]/ML
INJECTION, SOLUTION INTRAVENOUS; SUBCUTANEOUS
Status: COMPLETED
Start: 2020-05-08 | End: 2020-05-08

## 2020-05-08 RX ORDER — NITROGLYCERIN 0.4 MG/1
0.4 TABLET SUBLINGUAL ONCE
Status: DISCONTINUED | OUTPATIENT
Start: 2020-05-08 | End: 2020-05-08

## 2020-05-08 RX ORDER — ACETAMINOPHEN 325 MG/1
650 TABLET ORAL
Status: DISCONTINUED | OUTPATIENT
Start: 2020-05-08 | End: 2020-05-08

## 2020-05-08 RX ORDER — ASPIRIN 325 MG
325 TABLET ORAL
Status: COMPLETED | OUTPATIENT
Start: 2020-05-08 | End: 2020-05-08

## 2020-05-08 RX ORDER — GUAIFENESIN 100 MG/5ML
81 LIQUID (ML) ORAL DAILY
Status: DISCONTINUED | OUTPATIENT
Start: 2020-05-08 | End: 2020-05-11 | Stop reason: HOSPADM

## 2020-05-08 RX ORDER — HEPARIN SODIUM 5000 [USP'U]/ML
5000 INJECTION, SOLUTION INTRAVENOUS; SUBCUTANEOUS
Status: COMPLETED | OUTPATIENT
Start: 2020-05-08 | End: 2020-05-08

## 2020-05-08 RX ORDER — SODIUM CHLORIDE 0.9 % (FLUSH) 0.9 %
10 SYRINGE (ML) INJECTION
Status: ACTIVE | OUTPATIENT
Start: 2020-05-08 | End: 2020-05-08

## 2020-05-08 RX ORDER — AMIODARONE HYDROCHLORIDE 150 MG/3ML
INJECTION, SOLUTION INTRAVENOUS AS NEEDED
Status: DISCONTINUED | OUTPATIENT
Start: 2020-05-08 | End: 2020-05-08 | Stop reason: HOSPADM

## 2020-05-08 RX ORDER — ATORVASTATIN CALCIUM 40 MG/1
40 TABLET, FILM COATED ORAL DAILY
Status: DISCONTINUED | OUTPATIENT
Start: 2020-05-08 | End: 2020-05-11 | Stop reason: HOSPADM

## 2020-05-08 RX ORDER — ALBUTEROL SULFATE 0.83 MG/ML
5 SOLUTION RESPIRATORY (INHALATION)
Status: DISCONTINUED | OUTPATIENT
Start: 2020-05-08 | End: 2020-05-08

## 2020-05-08 RX ORDER — ATORVASTATIN CALCIUM 40 MG/1
40 TABLET, FILM COATED ORAL
Status: DISCONTINUED | OUTPATIENT
Start: 2020-05-08 | End: 2020-05-08

## 2020-05-08 RX ORDER — CLOPIDOGREL BISULFATE 75 MG/1
TABLET ORAL AS NEEDED
Status: DISCONTINUED | OUTPATIENT
Start: 2020-05-08 | End: 2020-05-08 | Stop reason: HOSPADM

## 2020-05-08 RX ORDER — FUROSEMIDE 10 MG/ML
INJECTION INTRAMUSCULAR; INTRAVENOUS AS NEEDED
Status: DISCONTINUED | OUTPATIENT
Start: 2020-05-08 | End: 2020-05-08 | Stop reason: HOSPADM

## 2020-05-08 RX ADMIN — ATORVASTATIN CALCIUM 40 MG: 40 TABLET, FILM COATED ORAL at 14:43

## 2020-05-08 RX ADMIN — CEFTRIAXONE SODIUM 1 G: 1 INJECTION, POWDER, FOR SOLUTION INTRAMUSCULAR; INTRAVENOUS at 14:44

## 2020-05-08 RX ADMIN — HEPARIN SODIUM 5000 UNITS: 5000 INJECTION, SOLUTION INTRAVENOUS; SUBCUTANEOUS at 07:56

## 2020-05-08 RX ADMIN — PHENYLEPHRINE HYDROCHLORIDE 30 MCG/MIN: 10 INJECTION INTRAVENOUS at 09:36

## 2020-05-08 RX ADMIN — GABAPENTIN 600 MG: 300 CAPSULE ORAL at 14:43

## 2020-05-08 RX ADMIN — HEPARIN SODIUM 5000 UNITS: 5000 INJECTION INTRAVENOUS; SUBCUTANEOUS at 07:56

## 2020-05-08 RX ADMIN — MORPHINE SULFATE 4 MG: 2 INJECTION, SOLUTION INTRAMUSCULAR; INTRAVENOUS at 07:19

## 2020-05-08 RX ADMIN — ASPIRIN 325 MG: 325 TABLET, FILM COATED ORAL at 07:21

## 2020-05-08 RX ADMIN — IOPAMIDOL 57 ML: 755 INJECTION, SOLUTION INTRAVENOUS at 07:31

## 2020-05-08 RX ADMIN — ASPIRIN 81 MG 81 MG: 81 TABLET ORAL at 14:43

## 2020-05-08 RX ADMIN — GABAPENTIN 600 MG: 300 CAPSULE ORAL at 21:09

## 2020-05-08 RX ADMIN — Medication 10 ML: at 14:57

## 2020-05-08 RX ADMIN — PHENYLEPHRINE HYDROCHLORIDE 30 MCG/MIN: 10 INJECTION INTRAVENOUS at 09:02

## 2020-05-08 RX ADMIN — Medication 10 ML: at 21:09

## 2020-05-08 RX ADMIN — AZITHROMYCIN MONOHYDRATE 500 MG: 500 INJECTION, POWDER, LYOPHILIZED, FOR SOLUTION INTRAVENOUS at 14:49

## 2020-05-08 NOTE — H&P
Consult/Admission    NAME: Marino Sandhoff   :  1945   MRN:  [id number]     Date/Time:  2020 2:53 PM    Patient PCP: Mike Angelo MD  ________________________________________________________________________     Assessment:     Anterior STEMI   Multivessel CAD   Cardiogenic shock , requiring pressor support. Peripheral Artery Disease   S/p R AKA   S/p L BKA   Pulmonary edema   Possible pneumonia. Possible Covid infection   Acute respiratory failure with hypoxemia  Former Smoker. PCI with Onoyx LATONYA LAD   PCI with Onxy LATONYA  CX   PCI with Martinez LATONYA RCA           Plan:     Acute coronary intervention   Respiratory support. Seb Synephrine  Support for BP     Consult Pulmonary . [x]           High complexity decision making was performed        Subjective:   CHIEF COMPLAINT:  SOB , chest tightness. HISTORY OF PRESENT ILLNESS:     Brittney Strong is a 76 y.o.  female who came to ER from home by EMS . Acute onset of severe SOB , coughing , and tightness in the chest.   Hx of PAD , with previous vascular surgery legs, with subsequent L BKA and R AKA . Dr Alyssia Alejandra . No hx of heart disease. No hx of Diabetes     Initial EKG shows ST elevation in V1 and V2 , with subsequent improvement on repeat EKG . Patient is a very poor historian, it is very difficult to get an understand ible story from patient . Discussed with son who could not provide much additioinal information . CTA of chest to r/o PE was done , No PE. Does have pulmonary edema and pleural effusions. Elevated Troponin and CK with MB . Tested for Covid infection with nasal swab test , result pending . Needs to go to cath lab for urgent angio and intervention . Prior cardiac history is notable for:  Functional capacity:    Currently    We were asked to admit for work up and evaluation of the above problems.      Past Medical History:   Diagnosis Date    Arthritis was in legs    Hypertension     Other ill-defined conditions(799.89)     extremety vascular disease    [facility]     PVD (peripheral vascular disease) ([facility]       Past Surgical History:   Procedure Laterality Date    COLONOSCOPY N/A 2018    COLONOSCOPY performed by Carla Curry MD at \[facility]"" ENDOSCOPY    HX AMPUTATION      Right Above Knee Amputation    HX AMPUTATION  2011    Left Below Knee Amputation    HX ORTHOPAEDIC  12     Debridement Left BKA Stump and Placement of Wound VA    HX OTHER SURGICAL  10/24/12    SPLIT THICKNESS SKIN GRAFT FROM RIGHT THIGH TO BELOW KNEE AMPUTATION STUMP (to wound left BKA stump    VEIN BYPASS GRAFT,FEM-TIBIAL  3/04/2011    Left femoral post. tibial bypass with vein     Allergies   Allergen Reactions    Percocet [Oxycodone-Acetaminophen] Nausea and Vomiting      Meds:  See below  Social History     Tobacco Use    Smoking status: Former Smoker     Last attempt to quit: 2008     Years since quittin.8    Smokeless tobacco: Never Used   Substance Use Topics    Alcohol use: No      Family History   Problem Relation Age of Onset    Hypertension Mother     Hypertension Father     Diabetes Sister     Diabetes Brother        REVIEW OF SYSTEMS:     []            Unable to obtain  ROS due to ---   [x]            Total of 12 systems reviewed as follows:    Constitutional: negative fever, negative chills, negative weight loss  Eyes:   negative visual changes  ENT:   negative sore throat, tongue or lip swelling  Respiratory:  negative cough, negative dyspnea  Cards:  negative for chest pain, palpitations, lower extremity edema  GI:   negative for nausea, vomiting, diarrhea, and abdominal pain  Genitourinary: negative for frequency, dysuria  Integument:  negative for rash   Hematologic:  negative for easy bruising and gum/nose bleeding  Musculoskel: negative for myalgias,  back pain  Neurological:  negative for headaches, dizziness, vertigo, weakness  Behavl/Psych: negative for feelings of anxiety, depression     Pertinent Positives include :    Objective:      Physical Exam:    Last 24hrs VS reviewed since prior progress note. Most recent are:    Visit Vitals  /71   Pulse 87   Temp 98.9 °F (37.2 °C)   Resp 24   LMP  (LMP Unknown)   SpO2 100%       Intake/Output Summary (Last 24 hours) at 5/8/2020 1453  Last data filed at 5/8/2020 1100  Gross per 24 hour   Intake 0 ml   Output    Net 0 ml        General Appearance: Well developed, well nourished, alert & oriented x 3,    no acute distress. Ears/Nose/Mouth/Throat: Pupils equal and round, Hearing grossly normal.  Neck: Supple. JVP within normal limits. Carotids good upstrokes, with no bruit. Chest: Lungs clear to auscultation bilaterally. Cardiovascular: Regular rate and rhythm, S1S2 normal, no murmur, rubs, gallops. Abdomen: Soft, non-tender, bowel sounds are active. No organomegaly. Extremities: No edema bilaterally. Femoral pulses +2, Distal Pulses +1. Skin: Warm and dry. Neuro: CN II-XII grossly intact, Strength and sensation grossly intact. Data:      Prior to Admission medications    Medication Sig Start Date End Date Taking? Authorizing Provider   atorvastatin (Lipitor) 40 mg tablet Take 40 mg by mouth daily. Yes Other, MD Stephani   hydrALAZINE (APRESOLINE) 25 mg tablet Take 25 mg by mouth two (2) times a day. Yes Provider, Historical   lisinopril (PRINIVIL, ZESTRIL) 40 mg tablet Take 40 mg by mouth daily. Yes Provider, Historical   ferrous sulfate 325 mg (65 mg iron) tablet Take 325 mg by mouth two (2) times daily (with meals). Yes Provider, Historical   hydrochlorothiazide (HYDRODIURIL) 25 mg tablet Take 1 Tab by mouth daily. 3/14/11  Yes Seng Burleson MD   amlodipine (NORVASC) 10 mg tablet Take 1 Tab by mouth daily. 3/14/11  Yes Seng Burleson MD   potassium chloride (K-DUR) 20 mEq tablet Take 1 Tab by mouth daily.  3/14/11  Yes Seng Burleson MD HYDROmorphone (DILAUDID) 4 mg tablet Take 1 Tab by mouth every four (4) hours as needed for Pain. 10/24/12   Avery Mckeon MD   GABAPENTIN (NEURONTIN PO) Take 600 mg by mouth three (3) times daily. Provider, Historical       Recent Results (from the past 24 hour(s))   EKG, 12 LEAD, INITIAL    Collection Time: 05/08/20  5:53 AM   Result Value Ref Range    Ventricular Rate 96 BPM    Atrial Rate 96 BPM    P-R Interval 156 ms    QRS Duration 100 ms    Q-T Interval 364 ms    QTC Calculation (Bezet) 459 ms    Calculated R Axis -48 degrees    Calculated T Axis 117 degrees    Diagnosis       Normal sinus rhythm  Incomplete right bundle branch block  Left anterior fascicular block  Septal infarct , age undetermined  Confirmed by Myrtie Cart ([id number]) on 5/8/2020 8:00:39 AM     CBC WITH AUTOMATED DIFF    Collection Time: 05/08/20  6:01 AM   Result Value Ref Range    WBC 12.0 (H) 3.6 - 11.0 K/uL    RBC 4.24 3.80 - 5.20 M/uL    HGB 10.5 (L) 11.5 - 16.0 g/dL    HCT 31.2 (L) 35.0 - 47.0 %    MCV 73.6 (L) 80.0 - 99.0 FL    MCH 24.8 (L) 26.0 - 34.0 PG    MCHC 33.7 30.0 - 36.5 g/dL    RDW 14.1 11.5 - 14.5 %    PLATELET 687 453 - 621 K/uL    MPV 11.3 8.9 - 12.9 FL    NRBC 0.0 0  WBC    ABSOLUTE NRBC 0.00 0.00 - 0.01 K/uL    NEUTROPHILS 78 (H) 32 - 75 %    LYMPHOCYTES 10 (L) 12 - 49 %    MONOCYTES 11 5 - 13 %    EOSINOPHILS 0 0 - 7 %    BASOPHILS 0 0 - 1 %    IMMATURE GRANULOCYTES 1 (H) 0.0 - 0.5 %    ABS. NEUTROPHILS 9.4 (H) 1.8 - 8.0 K/UL    ABS. LYMPHOCYTES 1.2 0.8 - 3.5 K/UL    ABS. MONOCYTES 1.3 (H) 0.0 - 1.0 K/UL    ABS. EOSINOPHILS 0.0 0.0 - 0.4 K/UL    ABS. BASOPHILS 0.0 0.0 - 0.1 K/UL    ABS. IMM.  GRANS. 0.1 (H) 0.00 - 0.04 K/UL    DF AUTOMATED     METABOLIC PANEL, COMPREHENSIVE    Collection Time: 05/08/20  6:01 AM   Result Value Ref Range    Sodium 129 (L) 136 - 145 mmol/L    Potassium 4.4 3.5 - 5.1 mmol/L    Chloride 97 97 - 108 mmol/L    CO2 22 21 - 32 mmol/L    Anion gap 10 5 - 15 mmol/L Glucose 150 (H) 65 - 100 mg/dL    BUN 28 (H) 6 - 20 MG/DL    Creatinine 1.21 (H) 0.55 - 1.02 MG/DL    BUN/Creatinine ratio 23 (H) 12 - 20      GFR est AA 53 (L) >60 ml/min/1.73m2    GFR est non-AA 43 (L) >60 ml/min/1.73m2    Calcium 8.9 8.5 - 10.1 MG/DL    Bilirubin, total 0.4 0.2 - 1.0 MG/DL    ALT (SGPT) 36 12 - 78 U/L    AST (SGOT) 46 (H) 15 - 37 U/L    Alk. phosphatase 60 45 - 117 U/L    Protein, total 7.7 6.4 - 8.2 g/dL    Albumin 3.6 3.5 - 5.0 g/dL    Globulin 4.1 (H) 2.0 - 4.0 g/dL    A-G Ratio 0.9 (L) 1.1 - 2.2     CK W/ REFLX CKMB    Collection Time: 05/08/20  6:01 AM   Result Value Ref Range     (H) 26 - 192 U/L   TROPONIN I    Collection Time: 05/08/20  6:01 AM   Result Value Ref Range    Troponin-I, Qt. 1.61 (H) <0.05 ng/mL   NT-PRO BNP    Collection Time: 05/08/20  6:01 AM   Result Value Ref Range    NT pro-BNP 15,665 (H) <450 PG/ML   MAGNESIUM    Collection Time: 05/08/20  6:01 AM   Result Value Ref Range    Magnesium 1.8 1.6 - 2.4 mg/dL   SAMPLES BEING HELD    Collection Time: 05/08/20  6:01 AM   Result Value Ref Range    SAMPLES BEING HELD  1 BLUE, RD, 1 SST     COMMENT        Add-on orders for these samples will be processed based on acceptable specimen integrity and analyte stability, which may vary by analyte. D DIMER    Collection Time: 05/08/20  6:01 AM   Result Value Ref Range    D-dimer 1.13 (H) 0.00 - 0.65 mg/L FEU   CK-MB,QUANT.     Collection Time: 05/08/20  6:01 AM   Result Value Ref Range    CK - MB 18.8 (H) <3.6 NG/ML    CK-MB Index 3.6 (H) 0.0 - 2.5     POC EG7    Collection Time: 05/08/20  6:29 AM   Result Value Ref Range    Calcium, ionized (POC) 1.24 1.12 - 1.32 mmol/L    pH (POC) 7.255 (L) 7.35 - 7.45      pCO2 (POC) 49.4 (H) 35.0 - 45.0 MMHG    pO2 (POC) 31 (LL) 80 - 100 MMHG    HCO3 (POC) 21.9 (L) 22 - 26 MMOL/L    Base deficit (POC) 5 mmol/L    sO2 (POC) 50 (L) 92 - 97 %    Site RIGHT RADIAL      Device: NASAL CANNULA      Flow rate (POC) 4 L/M    Allens test (POC) YES Specimen type (POC) VENOUS BLOOD      Total resp.  rate 24     LACTIC ACID    Collection Time: 05/08/20  6:52 AM   Result Value Ref Range    Lactic acid 3.4 (HH) 0.4 - 2.0 MMOL/L   EKG, 12 LEAD, SUBSEQUENT    Collection Time: 05/08/20  7:06 AM   Result Value Ref Range    Ventricular Rate 93 BPM    Atrial Rate 93 BPM    P-R Interval 176 ms    QRS Duration 100 ms    Q-T Interval 414 ms    QTC Calculation (Bezet) 514 ms    Calculated P Axis 53 degrees    Calculated R Axis -47 degrees    Calculated T Axis 94 degrees    Diagnosis       Normal sinus rhythm  Left anterior fascicular block  Anterior infarct , age undetermined  Prolonged QT  When compared with ECG of 08-MAY-2020 05:53,  MANUAL COMPARISON REQUIRED, DATA IS UNCONFIRMED     SARS-COV-2    Collection Time: 05/08/20  8:15 AM   Result Value Ref Range    Specimen source Nasopharyngeal      SARS-CoV-2 PENDING

## 2020-05-08 NOTE — Clinical Note
Lesion located in the Proximal RCA. Balloon inserted. Balloon inflated using multiple inflations inflation technique. Lesion #1: Pressure = 8 carolann; Duration = 15 sec. Inflation 2: Pressure = 8 carolann; Duration = 15 sec.

## 2020-05-08 NOTE — Clinical Note
TRANSFER - OUT REPORT:     Verbal report given to: Chito Saldana RN. Report consisted of patient's Situation, Background, Assessment and   Recommendations(SBAR). Opportunity for questions and clarification was provided. Patient transported with a Registered Nurse and [address] [place] [address] / [facility]. Patient transported to: [facility].

## 2020-05-08 NOTE — CARDIO/PULMONARY
Cardiac Rehab Note: chart review STEMI, cath with stents Consult has been acknowledged Echo ordered. Smoking history assessed. Patient is a former smoker. Smoking Cessation Program link has not been added to the AVS. Quit 2008. Patient not seen at bedside due to risk of possible COVID-19 exposure and to preserve PPE (now required for all staff in patient areas), education materials will be mailed home. (Topics include: MI/CAD) Patient has been referred to OP CP Rehab but facility is currently closed at this time due to COVID-19. A post discharge follow up call will be made to review and confirm patients understanding and agreement to complete CP Rehab activities at home during current COVID-19 practices. Patient will be contacted to schedule intake appointment as indicated.

## 2020-05-08 NOTE — PROGRESS NOTES
Has done well thru the day . BP is better. Off ASHLEY     Cannot tolerate ACEI or BB due to low BP      Respiratory improved. Now on 2 l/m nasal     1300 cc urine out . Titrate meds over weekend as tolerated. LV EF ~ 25 % on echo . Add Lisinopril and Coreg over next few days if BP allows.

## 2020-05-08 NOTE — Clinical Note
Lesion: Located in the Proximal LAD. Stent inserted. Single technique used. First inflation pressure = 14 carolann; inflation time: 15 sec.

## 2020-05-08 NOTE — PROGRESS NOTES
1416 act [address] from CCL up to pull right radial sheath.   1636 R radial sheath out by Mackenzie Couch, CCL 11cc in TR band  1700 TR band down to 8 cc.  1730 TR band down to 5 cc  1745 TR band down to 2 cc.  1800 TR band deflated, left on as patient is very active with her right arm.  1900 Report to Shandra Vergara, RN

## 2020-05-08 NOTE — Clinical Note
Lesion: Located in the Proximal RCA. Stent inserted. Stent deployed. Single technique used. First inflation pressure = 14 carolann; inflation time: 25 sec.

## 2020-05-08 NOTE — Clinical Note
Balloon inserted. Balloon inflated using multiple inflations inflation technique. Lesion #1: Pressure = 8 carolann; Duration = 15 sec. Inflation 2: Pressure = 8 carolann; Duration = 15 sec.

## 2020-05-08 NOTE — Clinical Note
Lesion: Located in the Proximal Cx. Stent inserted. Stent deployed. Single technique used. First inflation pressure = 15 carolann; inflation time: 15 sec.

## 2020-05-08 NOTE — ED NOTES
Bedside shift change report given to 1161 Scottie Patel (oncoming nurse) by Chris/Jamshid FAULKNER (offgoing nurse). Report included the following information SBAR, Kardex, ED Summary, [facility] and Recent Results.

## 2020-05-08 NOTE — ED NOTES
Pt presents to the ED with SOB and tight feeling in chest that started yesterday. Pt also c/o non productive cough. Placed on monitor x3. Call bell within reach. Will continue to monitor.

## 2020-05-08 NOTE — Clinical Note
Lesion: Located in the Proximal LAD. Re-inflated stent balloon and multiple inflations used. First inflation pressure = 15 carolann; inflation time: 10 sec. Second inflation pressure: 15 carolann; inflation time: 10 sec.

## 2020-05-08 NOTE — ED PROVIDER NOTES
EMERGENCY DEPARTMENT HISTORY AND PHYSICAL EXAM      Date: 5/8/2020  Patient Name: Verenice Verde  Patient Age and Sex: 76 y.o. female    History of Presenting Illness     Chief Complaint   Patient presents with    Shortness of Breath     ED visit d/t SOB / persistent coughing - on arrival EMS found the with 87% to 88% on room air - EMS medicated with solumedrol / Jannet Hilt / albuterol - improved POX on RA at this time        History Provided By: Patient    Ability to gather history was limited by:     HPI: Verenice Verde, 76 y.o. female with history of peripheral vascular disease, complains of shortness of breath and central chest discomfort for the past 2 to 3 days, waxing and waning, worse this morning, moderate to severe intensity. Pain is dull and aching in nature, central chest, nonradiating, also complaining of shortness of breath and cough, no fevers. No prior similar type pain. She has not taken any medications to alleviate the pain. No history of coronary artery disease or stents. Location:    Quality:      Severity:    Duration:   Timing:      Context:    Modifying factors:   Associated symptoms:       The patient's medical, surgical, family, and social history on file were reviewed by me today.       Past Medical History:   Diagnosis Date    Arthritis     was in legs    Hypertension     Other ill-defined conditions(799.89)     extremety vascular disease    [facility]per[facility]) [facility] [address]     PVD (peripheral vascular disease) ([facility] [address]      Past Surgical History:   Procedure Laterality Date    COLONOSCOPY N/A 11/7/2018    COLONOSCOPY performed by Ananda Hu MD at [facility]    HX AMPUTATION  2007    Right Above Knee Amputation    HX AMPUTATION  6/2011    Left Below Knee Amputation    HX ORTHOPAEDIC  7/17/12     Debridement Left BKA Stump and Placement of Wound [facility]    HX OTHER SURGICAL  10/24/12    SPLIT THICKNESS SKIN GRAFT FROM RIGHT THIGH TO BELOW KNEE AMPUTATION STUMP (to wound left BKA stump    VEIN BYPASS GRAFT,FEM-TIBIAL  3/04/2011    Left femoral post. tibial bypass with vein       PCP: Koki Cantrell MD    Past History     Past Medical History:  Past Medical History:   Diagnosis Date    Arthritis     was in legs    Hypertension     Other ill-defined conditions(799.89)     extremety vascular disease    [facility]AD [facility]peripheral [facility]) [facility] [address]     PVD (peripheral vascular disease) ([facility] [address]        Past Surgical History:  Past Surgical History:   Procedure Laterality Date    COLONOSCOPY N/A 2018    COLONOSCOPY performed by Iwona Aponte MD at [facility] ENDOSCOPY    HX AMPUTATION      Right Above Knee Amputation    HX AMPUTATION  2011    Left Below Knee Amputation    HX ORTHOPAEDIC  12     Debridement Left BKA Stump and Placement of Wound [facility]    HX OTHER SURGICAL  10/24/12    SPLIT THICKNESS SKIN GRAFT FROM RIGHT THIGH TO BELOW KNEE AMPUTATION STUMP (to wound left BKA stump    VEIN BYPASS GRAFT,FEM-TIBIAL  3/04/2011    Left femoral post. tibial bypass with vein       Family History:  Family History   Problem Relation Age of Onset    Hypertension Mother     Hypertension Father     Diabetes Sister     Diabetes Brother        Social History:  Social History     Tobacco Use    Smoking status: Former Smoker     Last attempt to quit: 2008     Years since quittin.8    Smokeless tobacco: Never Used   Substance Use Topics    Alcohol use: No    Drug use: No       Allergies: Allergies   Allergen Reactions    Percocet [Oxycodone-Acetaminophen] Nausea and Vomiting       Current Medications:  No current facility-administered medications on file prior to encounter. Current Outpatient Medications on File Prior to Encounter   Medication Sig Dispense Refill    atorvastatin (Lipitor) 40 mg tablet Take 40 mg by mouth daily.  hydrALAZINE (APRESOLINE) 25 mg tablet Take 25 mg by mouth two (2) times a day.         lisinopril (PRINIVIL, ZESTRIL) 40 mg tablet Take 40 mg by mouth daily.  ferrous sulfate 325 mg (65 mg iron) tablet Take 325 mg by mouth two (2) times daily (with meals).  hydrochlorothiazide (HYDRODIURIL) 25 mg tablet Take 1 Tab by mouth daily. 30 Tab 0    amlodipine (NORVASC) 10 mg tablet Take 1 Tab by mouth daily. 30 Tab 0    potassium chloride (K-DUR) 20 mEq tablet Take 1 Tab by mouth daily. 60 Tab 0    HYDROmorphone (DILAUDID) 4 mg tablet Take 1 Tab by mouth every four (4) hours as needed for Pain. 40 Tab 0    GABAPENTIN (NEURONTIN PO) Take 600 mg by mouth three (3) times daily. Review of Systems   Review of Systems   Constitutional: Negative for fatigue and fever. Respiratory: Positive for shortness of breath. Cardiovascular: Positive for chest pain. All other systems reviewed and are negative. Physical Exam   Vital Signs  Patient Vitals for the past 8 hrs:   Temp Pulse Resp BP SpO2   05/08/20 1400  85 27  97 %   05/08/20 1300  85 26  99 %   05/08/20 1230  87 24  100 %   05/08/20 1215  81 22  99 %   05/08/20 1200  85 24  96 %   05/08/20 1145  96 28  96 %   05/08/20 1130  81 19  95 %   05/08/20 1115  92 28  91 %   05/08/20 1100 98.9 °F (37.2 °C) 92 26 128/71 92 %   05/08/20 0758  98 24 105/67 97 %          Physical Exam  Vitals signs and nursing note reviewed. Constitutional:       General: She is in acute distress. Appearance: She is well-developed. She is ill-appearing. HENT:      Head: Normocephalic and atraumatic. Mouth/Throat:      Mouth: Mucous membranes are moist.   Eyes:      General:         Right eye: No discharge. Left eye: No discharge. Conjunctiva/sclera: Conjunctivae normal.   Neck:      Musculoskeletal: Normal range of motion and neck supple. Cardiovascular:      Rate and Rhythm: Normal rate and regular rhythm. Heart sounds: Normal heart sounds. No murmur. Pulmonary:      Effort: Pulmonary effort is normal. No respiratory distress.       Breath sounds: Normal breath sounds. No wheezing. Abdominal:      General: There is no distension. Palpations: Abdomen is soft. Tenderness: There is no abdominal tenderness. Musculoskeletal: Normal range of motion. General: No deformity. Comments: Right AKA, left BKA   Skin:     General: Skin is warm and dry. Findings: No rash. Neurological:      Mental Status: She is alert and oriented to person, place, and time. Psychiatric:         Behavior: Behavior normal.         Thought Content: Thought content normal.         Diagnostic Study Results   Labs  Recent Results (from the past 24 hour(s))   EKG, 12 LEAD, INITIAL    Collection Time: 05/08/20  5:53 AM   Result Value Ref Range    Ventricular Rate 96 BPM    Atrial Rate 96 BPM    P-R Interval 156 ms    QRS Duration 100 ms    Q-T Interval 364 ms    QTC Calculation (Bezet) 459 ms    Calculated R Axis -48 degrees    Calculated T Axis 117 degrees    Diagnosis       Normal sinus rhythm  Incomplete right bundle branch block  Left anterior fascicular block  Septal infarct , age undetermined  Confirmed by Sheree Singh ([id number]) on 5/8/2020 8:00:39 AM     CBC WITH AUTOMATED DIFF    Collection Time: 05/08/20  6:01 AM   Result Value Ref Range    WBC 12.0 (H) 3.6 - 11.0 K/uL    RBC 4.24 3.80 - 5.20 M/uL    HGB 10.5 (L) 11.5 - 16.0 g/dL    HCT 31.2 (L) 35.0 - 47.0 %    MCV 73.6 (L) 80.0 - 99.0 FL    MCH 24.8 (L) 26.0 - 34.0 PG    MCHC 33.7 30.0 - 36.5 g/dL    RDW 14.1 11.5 - 14.5 %    PLATELET 222 859 - 665 K/uL    MPV 11.3 8.9 - 12.9 FL    NRBC 0.0 0  WBC    ABSOLUTE NRBC 0.00 0.00 - 0.01 K/uL    NEUTROPHILS 78 (H) 32 - 75 %    LYMPHOCYTES 10 (L) 12 - 49 %    MONOCYTES 11 5 - 13 %    EOSINOPHILS 0 0 - 7 %    BASOPHILS 0 0 - 1 %    IMMATURE GRANULOCYTES 1 (H) 0.0 - 0.5 %    ABS. NEUTROPHILS 9.4 (H) 1.8 - 8.0 K/UL    ABS. LYMPHOCYTES 1.2 0.8 - 3.5 K/UL    ABS. MONOCYTES 1.3 (H) 0.0 - 1.0 K/UL    ABS. EOSINOPHILS 0.0 0.0 - 0.4 K/UL    ABS.  BASOPHILS 0.0 0.0 - 0.1 K/UL    ABS. IMM. GRANS. 0.1 (H) 0.00 - 0.04 K/UL    DF AUTOMATED     METABOLIC PANEL, COMPREHENSIVE    Collection Time: 05/08/20  6:01 AM   Result Value Ref Range    Sodium 129 (L) 136 - 145 mmol/L    Potassium 4.4 3.5 - 5.1 mmol/L    Chloride 97 97 - 108 mmol/L    CO2 22 21 - 32 mmol/L    Anion gap 10 5 - 15 mmol/L    Glucose 150 (H) 65 - 100 mg/dL    BUN 28 (H) 6 - 20 MG/DL    Creatinine 1.21 (H) 0.55 - 1.02 MG/DL    BUN/Creatinine ratio 23 (H) 12 - 20      GFR est AA 53 (L) >60 ml/min/1.73m2    GFR est non-AA 43 (L) >60 ml/min/1.73m2    Calcium 8.9 8.5 - 10.1 MG/DL    Bilirubin, total 0.4 0.2 - 1.0 MG/DL    ALT (SGPT) 36 12 - 78 U/L    AST (SGOT) 46 (H) 15 - 37 U/L    Alk. phosphatase 60 45 - 117 U/L    Protein, total 7.7 6.4 - 8.2 g/dL    Albumin 3.6 3.5 - 5.0 g/dL    Globulin 4.1 (H) 2.0 - 4.0 g/dL    A-G Ratio 0.9 (L) 1.1 - 2.2     CK W/ REFLX CKMB    Collection Time: 05/08/20  6:01 AM   Result Value Ref Range     (H) 26 - 192 U/L   TROPONIN I    Collection Time: 05/08/20  6:01 AM   Result Value Ref Range    Troponin-I, Qt. 1.61 (H) <0.05 ng/mL   NT-PRO BNP    Collection Time: 05/08/20  6:01 AM   Result Value Ref Range    NT pro-BNP 15,665 (H) <450 PG/ML   MAGNESIUM    Collection Time: 05/08/20  6:01 AM   Result Value Ref Range    Magnesium 1.8 1.6 - 2.4 mg/dL   SAMPLES BEING HELD    Collection Time: 05/08/20  6:01 AM   Result Value Ref Range    SAMPLES BEING HELD  1 BLUE, RD, 1 SST     COMMENT        Add-on orders for these samples will be processed based on acceptable specimen integrity and analyte stability, which may vary by analyte. D DIMER    Collection Time: 05/08/20  6:01 AM   Result Value Ref Range    D-dimer 1.13 (H) 0.00 - 0.65 mg/L FEU   CK-MB,QUANT.     Collection Time: 05/08/20  6:01 AM   Result Value Ref Range    CK - MB 18.8 (H) <3.6 NG/ML    CK-MB Index 3.6 (H) 0.0 - 2.5     POC EG7    Collection Time: 05/08/20  6:29 AM   Result Value Ref Range    Calcium, ionized (POC) 1.24 1.12 - 1.32 mmol/L    pH (POC) 7.255 (L) 7.35 - 7.45      pCO2 (POC) 49.4 (H) 35.0 - 45.0 MMHG    pO2 (POC) 31 (LL) 80 - 100 MMHG    HCO3 (POC) 21.9 (L) 22 - 26 MMOL/L    Base deficit (POC) 5 mmol/L    sO2 (POC) 50 (L) 92 - 97 %    Site RIGHT RADIAL      Device: NASAL CANNULA      Flow rate (POC) 4 L/M    Allens test (POC) YES      Specimen type (POC) VENOUS BLOOD      Total resp. rate 24     LACTIC ACID    Collection Time: 05/08/20  6:52 AM   Result Value Ref Range    Lactic acid 3.4 (HH) 0.4 - 2.0 MMOL/L   EKG, 12 LEAD, SUBSEQUENT    Collection Time: 05/08/20  7:06 AM   Result Value Ref Range    Ventricular Rate 93 BPM    Atrial Rate 93 BPM    P-R Interval 176 ms    QRS Duration 100 ms    Q-T Interval 414 ms    QTC Calculation (Bezet) 514 ms    Calculated P Axis 53 degrees    Calculated R Axis -47 degrees    Calculated T Axis 94 degrees    Diagnosis       Normal sinus rhythm  Left anterior fascicular block  Anterior infarct , age undetermined  Prolonged QT  When compared with ECG of 08-MAY-2020 05:53,  MANUAL COMPARISON REQUIRED, DATA IS UNCONFIRMED     SARS-COV-2    Collection Time: 05/08/20  8:15 AM   Result Value Ref Range    Specimen source Nasopharyngeal      SARS-CoV-2 PENDING        Radiologic Studies  CTA CHEST W OR W WO CONT   Final Result   IMPRESSION: Bilateral pleural effusions with basilar infiltrates. XR CHEST PORT   Final Result   IMPRESSION: Bibasilar airspace disease. Pneumonia is not excluded. CT Results  (Last 48 hours)               05/08/20 0738  CTA CHEST W OR W WO CONT Final result    Impression:  IMPRESSION: Bilateral pleural effusions with basilar infiltrates. Narrative:  Clinical indication: Chest pain and shortness of breath. Localizer obtained without contrast at the level of the pulmonary arteries. Fast   injection rate of 57 cc of Isovue-370 with review of the raw data and MIP   reconstructions. No prior.  CT dose reduction was achieved through the use of a standardized protocol tailored for this examination and automatic exposure   control for dose modulation . .       Mild sized bilateral pleural effusions. No pneumothorax or shift. Small   pleural-based nodule left upper lobe anteriorly measures 7 mm should be followed   up. Diffuse increased interstitial pattern nonspecific. By basilar infiltrates   more prominent on the left. No pulmonary emboli. Nonspecific, viral pneumonia   should be considered. No adenopathy. CXR Results  (Last 48 hours)               05/08/20 0701  XR CHEST PORT Final result    Impression:  IMPRESSION: Bibasilar airspace disease. Pneumonia is not excluded. Narrative:  EXAM: XR CHEST PORT       INDICATION: sob       COMPARISON: 2/11/2012       FINDINGS: A portable AP radiograph of the chest was obtained at 653 hours. The   patient is on a cardiac monitor. There is interval development of bibasilar   airspace disease. The cardiac and mediastinal contours and pulmonary vascularity   are normal.  The bones and soft tissues are grossly within normal limits.                   Procedures   EKG  Date/Time: 5/8/2020 3:36 PM  Performed by: Lisa Tyson MD  Authorized by: Lisa Tyson MD     ECG reviewed by ED Physician in the absence of a cardiologist: yes    Interpretation:     Interpretation: abnormal    Rate:     ECG rate assessment: normal    Rhythm:     Rhythm: sinus rhythm    Ectopy:     Ectopy: none    QRS:     QRS axis:  Normal  ST segments:     ST segments:  Abnormal    Elevation:  V1 and V2  T waves:     T waves: normal    CRITICAL CARE (ASAP ONLY)  Performed by: Lisa Tyson MD  Authorized by: Lisa Tyson MD     Critical care provider statement:     Critical care time (minutes):  50    Critical care time was exclusive of:  Separately billable procedures and treating other patients    Critical care was necessary to treat or prevent imminent or life-threatening deterioration of the following conditions:  Cardiac failure    Critical care was time spent personally by me on the following activities:  Ordering and performing treatments and interventions, ordering and review of laboratory studies, ordering and review of radiographic studies, pulse oximetry, re-evaluation of patient's condition, discussions with primary provider, evaluation of patient's response to treatment, discussions with consultants, development of treatment plan with patient or surrogate and examination of patient        Medical Decision Making     Provider Notes (Medical Decision Making):   79-year-old female complaining of chest pain and shortness of breath, no history of CAD or MI. She is found to have modest elevations in V1 and V2, does not formally meet Cath Lab criteria. She was found to have a positive troponin and elevated BNP, both of which are acute. Sent for urgent CTA to rule out pulmonary embolism, and CTA was negative. I consulted Dr. Josette Castaneda (interventional cardiology) prior to the CTA to discuss her EKG findings and likely need for cardiac cath. We agreed that she did not formally meet STEMI criteria but had concerning EKG. Patient was given heparin, aspirin, sent for urgent Cath Lab intervention. Shamika Poe MD  3:33 PM      I personally performed a bedside echocardiogram, some images are recorded below. Showed a normal right ventricle, no Zhang sign. She has diminished contractility in the septum and anterior left ventricle, EF 40 to 50%. No significant pericardial effusion.     Lele Hernandez MD                    Social History     Tobacco Use    Smoking status: Former Smoker     Last attempt to quit: 2008     Years since quittin.8    Smokeless tobacco: Never Used   Substance Use Topics    Alcohol use: No    Drug use: No     Patient Vitals for the past 4 hrs:   Pulse Resp SpO2   20 1400 85 27 97 %   20 1300 85 26 99 %   20 1230 87 24 100 % 05/08/20 1215 81 22 99 %   05/08/20 1200 85 24 96 %   05/08/20 1145 96 28 96 %              Consults:      Medications Administered during ED course:  Medications   sodium chloride (NS) flush 10 mL ( IntraVENous Canceled Entry 5/8/20 0717)   PHENYLephrine (ASHLEY-SYNEPHRINE) 30 mg in 0.9% sodium chloride 250 mL infusion (0 mcg/min IntraVENous Stopped 5/8/20 1100)   atorvastatin (LIPITOR) tablet 40 mg (40 mg Oral Given 5/8/20 1443)   gabapentin (NEURONTIN) capsule 600 mg (600 mg Oral Given 5/8/20 1443)   sodium chloride (NS) flush 5-40 mL (10 mL IntraVENous Given 5/8/20 1457)   sodium chloride (NS) flush 5-40 mL (has no administration in time range)   aspirin chewable tablet 81 mg (81 mg Oral Given 5/8/20 1443)   LORazepam (ATIVAN) tablet 1 mg (1 mg Oral Given 5/8/20 1444)   zolpidem (AMBIEN) tablet 5 mg (has no administration in time range)   clopidogreL (PLAVIX) tablet 75 mg (has no administration in time range)   cefTRIAXone (ROCEPHIN) 1 g in 0.9% sodium chloride (MBP/ADV) 50 mL (1 g IntraVENous New Bag 5/8/20 1444)   azithromycin (ZITHROMAX) 500 mg in 0.9% sodium chloride (MBP/ADV) 250 mL (500 mg IntraVENous New Bag 5/8/20 1449)   morphine injection 4 mg (4 mg IntraVENous Given 5/8/20 0719)   aspirin tablet 325 mg (325 mg Oral Given 5/8/20 0721)   iopamidoL (ISOVUE-370) 76 % injection 100 mL (57 mL IntraVENous Given 5/8/20 0731)   heparin (porcine) injection 5,000 Units (5,000 Units IntraVENous Given 5/8/20 0756)   heparinized saline 2 units/mL infusion (500 mL Irrigation New Bag 5/8/20 0839)   heparinized saline 2 units/mL infusion (500 mL Irrigation New Bag 5/8/20 0839)   heparinized saline 2 units/mL infusion (500 mL Irrigation New Bag 5/8/20 0839)   sodium chloride 0.9 % bolus infusion (250 mL IntraVENous New Bag 5/8/20 0844)                Diagnosis and Disposition     Disposition:  Admitted    Clinical Impression:   1. ST elevation myocardial infarction (STEMI), unspecified artery ([facility] [address]    2.  ST elevation myocardial infarction (STEMI) of anterior wall ([facility] [address]        Attestation:  I personally performed the services described in this documentation on this date 5/8/2020 for patient Yash Calderon. Ana Menendez MD        I was the first provider for this patient on this visit. To the best of my ability I reviewed relevant prior medical records, electrocardiograms, laboratories, and radiologic studies. The patient's presenting problems were discussed, and the patient was in agreement with the care plan formulated and outlined with them. Ana Menendez MD    Please note that this dictation was completed with Dragon voice recognition software. Quite often unanticipated grammatical, syntax, homophones, and other interpretive errors are inadvertently transcribed by the computer software. Please disregard these errors and excuse any errors that have escaped final proofreading.

## 2020-05-08 NOTE — PROGRESS NOTES
1025 TRANSFER - IN REPORT:    Verbal report received from Jason(name) on Verenice Verde  being received from cath lab(unit) for routine progression of care      Report consisted of patients Situation, Background, Assessment and   Recommendations(SBAR). Information from the following report(s) SBAR, Kardex, ED Summary, Procedure Summary, Intake/Output, MAR, Recent Results and Cardiac Rhythm NSR was reviewed with the receiving nurse. Opportunity for questions and clarification was provided. Assessment completed upon patients arrival to unit and care assumed.

## 2020-05-08 NOTE — PROGRESS NOTES
5/8/2020     INTENSIVIST PROGRESS NOTE:     Patient seen and evaluated, chart reviewed   77 yo female presented with chest tightness, sob, cough  Found to have a STEMI  Taken to the cath lab, revealing severe 3V CAD, underwent PTCA and multiple stents placement  Now pt in CCU hypoxic, in shock, on pressors  Critically ill    ROS: sob, chest tightness, cough    Visit Vitals  /67   Pulse 98   Temp 97.6 °F (36.4 °C)   Resp 24   SpO2 97%       General: some respiratory distress  Eyes: anicteric  HEENT: dry oral mucosa  Neck: FROM  CV: RRR  Lungs: rales  Abd: soft  : no flank pain  Ext: s/p bilat AKA  Skin: no rash  Musculoskeletal: bilat AKA  Neuro: awake    CXR: pulmonary edema  Chest ct: bilateral pleural effusions, bibasilar asdz vs atx    Labs reviewed    A/P:  - acute respiratory failure: O2 to keep sats > 90%  - STEMI: s/p cath  - acute pulmonary edema: diurese as tolerated  - will add empiric abx to cover for CAP  - BONNIE: monitor creatinine  - shock: wean pressors  - DVT prophylxis  - critically ill  - Will assist on disposition planning when stable for transfer  Franci Salvador MD

## 2020-05-08 NOTE — Clinical Note
Lesion located in the Proximal RCA. Balloon inserted. Balloon inflated using multiple inflations inflation technique. Lesion #1: Pressure = 15 carolann; Duration = 15 sec. Inflation 2: Pressure = 15 carolann; Duration = 15 sec.

## 2020-05-08 NOTE — Clinical Note
Lesion located in the Proximal LAD. Balloon inserted. Balloon inflated using single inflation technique. Lesion #1: Pressure = 8 carolann; Duration = 15 sec.

## 2020-05-08 NOTE — Clinical Note
Lesion: Located in the Mid LAD. Stent inserted. Stent deployed. Single technique used. First inflation pressure = 14 carolann; inflation time: 12 sec.

## 2020-05-08 NOTE — ACP (ADVANCE CARE PLANNING)
Advance Care Planning     Advance Care Planning Clinical Specialist  Chart review Note      Date of ACP Chart review: 5/8/2020    Conversation Conducted with:  Patient currently in Cardiac Cath Lab    Will follow to update demographics,contact numbers and to discuss AMD.      ACP Clinical Specialist: Marielena Locke RN

## 2020-05-08 NOTE — Clinical Note
Balloon inserted. Balloon inflated using multiple inflations inflation technique. Lesion #1: Pressure = 12 carolann; Duration = 15 sec. Inflation 2: Pressure = 18 carolann; Duration = 15 sec.

## 2020-05-08 NOTE — Clinical Note
Lesion: Located in the [place] RCA. Stent inserted. Stent deployed. Single technique used. First inflation pressure = 18 carolann; inflation time: 20 sec.

## 2020-05-09 ENCOUNTER — APPOINTMENT (OUTPATIENT)
Dept: GENERAL RADIOLOGY | Age: 75
DRG: 246 | End: 2020-05-09
Attending: INTERNAL MEDICINE
Payer: MEDICARE

## 2020-05-09 LAB
ACT BLD: 164 SECS (ref 79–138)
ACT BLD: 186 SECS (ref 79–138)
ANION GAP SERPL CALC-SCNC: 10 MMOL/L (ref 5–15)
ATRIAL RATE: 71 BPM
ATRIAL RATE: 93 BPM
B PERT DNA SPEC QL NAA+PROBE: NOT DETECTED
BASOPHILS # BLD: 0 K/UL (ref 0–0.1)
BASOPHILS NFR BLD: 0 % (ref 0–1)
BORDETELLA PARAPERTUSSIS PCR, BORPAR: NOT DETECTED
BUN SERPL-MCNC: 34 MG/DL (ref 6–20)
BUN/CREAT SERPL: 30 (ref 12–20)
C PNEUM DNA SPEC QL NAA+PROBE: NOT DETECTED
CALCIUM SERPL-MCNC: 7.9 MG/DL (ref 8.5–10.1)
CALCULATED P AXIS, ECG09: 51 DEGREES
CALCULATED P AXIS, ECG09: 53 DEGREES
CALCULATED R AXIS, ECG10: -47 DEGREES
CALCULATED R AXIS, ECG10: -49 DEGREES
CALCULATED T AXIS, ECG11: 119 DEGREES
CALCULATED T AXIS, ECG11: 94 DEGREES
CHLORIDE SERPL-SCNC: 100 MMOL/L (ref 97–108)
CO2 SERPL-SCNC: 21 MMOL/L (ref 21–32)
CREAT SERPL-MCNC: 1.15 MG/DL (ref 0.55–1.02)
DIAGNOSIS, 93000: NORMAL
DIAGNOSIS, 93000: NORMAL
DIFFERENTIAL METHOD BLD: ABNORMAL
EOSINOPHIL # BLD: 0 K/UL (ref 0–0.4)
EOSINOPHIL NFR BLD: 0 % (ref 0–7)
ERYTHROCYTE [DISTWIDTH] IN BLOOD BY AUTOMATED COUNT: 13.7 % (ref 11.5–14.5)
FLUAV H1 2009 PAND RNA SPEC QL NAA+PROBE: NOT DETECTED
FLUAV H1 RNA SPEC QL NAA+PROBE: NOT DETECTED
FLUAV H3 RNA SPEC QL NAA+PROBE: NOT DETECTED
FLUAV SUBTYP SPEC NAA+PROBE: NOT DETECTED
FLUBV RNA SPEC QL NAA+PROBE: NOT DETECTED
GLUCOSE SERPL-MCNC: 75 MG/DL (ref 65–100)
HADV DNA SPEC QL NAA+PROBE: NOT DETECTED
HCOV 229E RNA SPEC QL NAA+PROBE: NOT DETECTED
HCOV HKU1 RNA SPEC QL NAA+PROBE: NOT DETECTED
HCOV NL63 RNA SPEC QL NAA+PROBE: NOT DETECTED
HCOV OC43 RNA SPEC QL NAA+PROBE: NOT DETECTED
HCT VFR BLD AUTO: 22.9 % (ref 35–47)
HGB BLD-MCNC: 8 G/DL (ref 11.5–16)
HMPV RNA SPEC QL NAA+PROBE: NOT DETECTED
HPIV1 RNA SPEC QL NAA+PROBE: NOT DETECTED
HPIV2 RNA SPEC QL NAA+PROBE: NOT DETECTED
HPIV3 RNA SPEC QL NAA+PROBE: NOT DETECTED
HPIV4 RNA SPEC QL NAA+PROBE: NOT DETECTED
IMM GRANULOCYTES # BLD AUTO: 0.1 K/UL (ref 0–0.04)
IMM GRANULOCYTES NFR BLD AUTO: 1 % (ref 0–0.5)
LYMPHOCYTES # BLD: 1.3 K/UL (ref 0.8–3.5)
LYMPHOCYTES NFR BLD: 17 % (ref 12–49)
M PNEUMO DNA SPEC QL NAA+PROBE: NOT DETECTED
MCH RBC QN AUTO: 24.8 PG (ref 26–34)
MCHC RBC AUTO-ENTMCNC: 34.9 G/DL (ref 30–36.5)
MCV RBC AUTO: 71.1 FL (ref 80–99)
MONOCYTES # BLD: 1.3 K/UL (ref 0–1)
MONOCYTES NFR BLD: 17 % (ref 5–13)
NEUTS SEG # BLD: 5.2 K/UL (ref 1.8–8)
NEUTS SEG NFR BLD: 65 % (ref 32–75)
NRBC # BLD: 0 K/UL (ref 0–0.01)
NRBC BLD-RTO: 0 PER 100 WBC
P-R INTERVAL, ECG05: 176 MS
P-R INTERVAL, ECG05: 176 MS
PLATELET # BLD AUTO: 222 K/UL (ref 150–400)
PMV BLD AUTO: 11.4 FL (ref 8.9–12.9)
POTASSIUM SERPL-SCNC: 4.1 MMOL/L (ref 3.5–5.1)
Q-T INTERVAL, ECG07: 414 MS
Q-T INTERVAL, ECG07: 426 MS
QRS DURATION, ECG06: 100 MS
QRS DURATION, ECG06: 100 MS
QTC CALCULATION (BEZET), ECG08: 462 MS
QTC CALCULATION (BEZET), ECG08: 514 MS
RBC # BLD AUTO: 3.22 M/UL (ref 3.8–5.2)
RSV RNA SPEC QL NAA+PROBE: NOT DETECTED
RV+EV RNA SPEC QL NAA+PROBE: NOT DETECTED
SARS-COV-2, COV2: NOT DETECTED
SODIUM SERPL-SCNC: 131 MMOL/L (ref 136–145)
SPECIMEN SOURCE, FCOV2M: NORMAL
TROPONIN I SERPL-MCNC: 10.8 NG/ML
VENTRICULAR RATE, ECG03: 71 BPM
VENTRICULAR RATE, ECG03: 93 BPM
WBC # BLD AUTO: 7.9 K/UL (ref 3.6–11)

## 2020-05-09 PROCEDURE — 77010033678 HC OXYGEN DAILY

## 2020-05-09 PROCEDURE — 74011250636 HC RX REV CODE- 250/636: Performed by: INTERNAL MEDICINE

## 2020-05-09 PROCEDURE — 87635 SARS-COV-2 COVID-19 AMP PRB: CPT

## 2020-05-09 PROCEDURE — 74011250637 HC RX REV CODE- 250/637: Performed by: INTERNAL MEDICINE

## 2020-05-09 PROCEDURE — 85025 COMPLETE CBC W/AUTO DIFF WBC: CPT

## 2020-05-09 PROCEDURE — 36415 COLL VENOUS BLD VENIPUNCTURE: CPT

## 2020-05-09 PROCEDURE — 93005 ELECTROCARDIOGRAM TRACING: CPT

## 2020-05-09 PROCEDURE — 80048 BASIC METABOLIC PNL TOTAL CA: CPT

## 2020-05-09 PROCEDURE — 74011000258 HC RX REV CODE- 258: Performed by: INTERNAL MEDICINE

## 2020-05-09 PROCEDURE — 71045 X-RAY EXAM CHEST 1 VIEW: CPT

## 2020-05-09 PROCEDURE — 65620000000 HC RM CCU GENERAL

## 2020-05-09 PROCEDURE — 0100U RESPIRATORY PANEL,PCR,NASOPHARYNGEAL: CPT

## 2020-05-09 PROCEDURE — 84484 ASSAY OF TROPONIN QUANT: CPT

## 2020-05-09 RX ORDER — LISINOPRIL 5 MG/1
2.5 TABLET ORAL DAILY
Status: DISCONTINUED | OUTPATIENT
Start: 2020-05-09 | End: 2020-05-11

## 2020-05-09 RX ORDER — FUROSEMIDE 10 MG/ML
20 INJECTION INTRAMUSCULAR; INTRAVENOUS ONCE
Status: COMPLETED | OUTPATIENT
Start: 2020-05-09 | End: 2020-05-09

## 2020-05-09 RX ADMIN — Medication 10 ML: at 14:00

## 2020-05-09 RX ADMIN — ASPIRIN 81 MG 81 MG: 81 TABLET ORAL at 08:09

## 2020-05-09 RX ADMIN — LISINOPRIL 2.5 MG: 5 TABLET ORAL at 08:08

## 2020-05-09 RX ADMIN — GABAPENTIN 600 MG: 300 CAPSULE ORAL at 06:20

## 2020-05-09 RX ADMIN — ATORVASTATIN CALCIUM 40 MG: 40 TABLET, FILM COATED ORAL at 08:09

## 2020-05-09 RX ADMIN — GABAPENTIN 600 MG: 300 CAPSULE ORAL at 21:01

## 2020-05-09 RX ADMIN — CEFTRIAXONE SODIUM 1 G: 1 INJECTION, POWDER, FOR SOLUTION INTRAMUSCULAR; INTRAVENOUS at 10:52

## 2020-05-09 RX ADMIN — CLOPIDOGREL BISULFATE 75 MG: 75 TABLET ORAL at 08:09

## 2020-05-09 RX ADMIN — Medication 20 ML: at 21:01

## 2020-05-09 RX ADMIN — FUROSEMIDE 20 MG: 10 INJECTION, SOLUTION INTRAMUSCULAR; INTRAVENOUS at 10:52

## 2020-05-09 RX ADMIN — LORAZEPAM 1 MG: 1 TABLET ORAL at 14:06

## 2020-05-09 RX ADMIN — AZITHROMYCIN MONOHYDRATE 500 MG: 500 INJECTION, POWDER, LYOPHILIZED, FOR SOLUTION INTRAVENOUS at 11:27

## 2020-05-09 RX ADMIN — GABAPENTIN 600 MG: 300 CAPSULE ORAL at 14:45

## 2020-05-09 RX ADMIN — Medication 10 ML: at 06:21

## 2020-05-09 NOTE — PROGRESS NOTES
Problem: Pressure Injury - Risk of  Goal: *Prevention of pressure injury  Description: Document Samy Scale and appropriate interventions in the flowsheet. Outcome: Progressing Towards Goal  Note: Pressure Injury Interventions: Activity Interventions: Pressure redistribution bed/mattress(bed type)    Mobility Interventions: Pressure redistribution bed/mattress (bed type)    Nutrition Interventions: Document food/fluid/supplement intake, Offer support with meals,snacks and hydration    Friction and Shear Interventions: Apply protective barrier, creams and emollients, Minimize layers                Problem: Patient Education: Go to Patient Education Activity  Goal: Patient/Family Education  Outcome: Progressing Towards Goal     Problem: Falls - Risk of  Goal: *Absence of Falls  Description: Document Crissy Fall Risk and appropriate interventions in the flowsheet.   Outcome: Progressing Towards Goal  Note: Fall Risk Interventions:  Mobility Interventions: Patient to call before getting OOB, Strengthening exercises (ROM-active/passive)         Medication Interventions: Evaluate medications/consider consulting pharmacy, Patient to call before getting OOB, Teach patient to arise slowly    Elimination Interventions: Call light in reach, Patient to call for help with toileting needs, Toilet paper/wipes in reach, Toileting schedule/hourly rounds              Problem: Patient Education: Go to Patient Education Activity  Goal: Patient/Family Education  Outcome: Progressing Towards Goal

## 2020-05-09 NOTE — PROGRESS NOTES
[id number] Shift summary note. Patient post MI w/ pulmonary edema on 5/8 received 20 mg lasix, no longer having wet cough. Now on 2 L NC sats . Denies CP/N/SOB. Patient frustrated occasionally almost tearful asking \"when can I go home\". Advised patient that most likely she will be in the hospital for another day or so.  1 mg ativan given for patient's anxiety. 1900 Bedside shift change report given to Parker (oncoming nurse) by Natalya(offgoing nurse). Report included the following information SBAR, Kardex, ED Summary, Procedure Summary, Intake/Output, MAR, Accordion, Recent Results, Med Rec Status, Cardiac Rhythm SR, Alarm Parameters , Pre Procedure Checklist and Procedure Verification.

## 2020-05-09 NOTE — PROGRESS NOTES
5/9/2020     INTENSIVIST PROGRESS NOTE:   5/9  Medical records and data reviewed  On Droplet plus isolation pending SARS CoV 2 results  Visual exam to conserve PPE (small N95 not available on unit)  Cath findings noted- LVEDP of 40  On empiric abx for patchy asymmetric ASD  O2 requirement less-- on room air  SBP ~ 986 systolic, heart failure meds were started today  Low grade fever on admission      5/8  Patient seen and evaluated, chart reviewed   75 yo female presented with chest tightness, sob, cough  Found to have a STEMI  Taken to the cath lab, revealing severe 3V CAD, underwent PTCA and multiple stents placement  Now pt in CCU hypoxic, in shock, on pressors  Critically ill    ROS: sob, chest tightness, cough    Visit Vitals  /59 (BP 1 Location: Left arm, BP Patient Position: At rest;Sitting)   Pulse 86   Temp 97.8 °F (36.6 °C)   Resp 20   Ht 4' 2\" (1.27 m)   Wt 61.2 kg (134 lb 14.7 oz)   SpO2 97%   BMI 37.94 kg/m²       General: awake  Lungs: equal expansion  Ext: s/p bilat AKA  Skin: no rash  Musculoskeletal: bilat AKA  Neuro: awake    CXR: pulmonary edema  Chest ct: bilateral pleural effusions, bibasilar asdz vs atx    Labs reviewed    A/P:  - acute respiratory failure: O2 to keep sats > 90%  - STEMI: s/p cath.  HF meds being adjusted  - acute pulmonary edema: diurese as tolerated  - will add empiric abx to cover for CAP-- SARS CoV 2, resp PCR and sputum cx pending  - BONNIE: monitor creatinine  - shock: off pressors  - DVT prophylxis  - critically ill  - Will assist on disposition planning when stable for transfer  - D/W LUCIE Mosquera MD

## 2020-05-09 NOTE — PROGRESS NOTES
Critical Care Progress Note    NAME: Duy Weber   :  1945   MRN:  [id number]     Date/Time:  2020    Patient PCP: Ricardo Kamara MD  ________________________________________________________________________     Assessment:     Anterior STEMI, see below for PCI targets  Multivessel CAD   Cardiogenic shock , requiring pressor support, now off pressor   Peripheral Artery Disease   S/p R AKA   S/p L BKA   Pulmonary edema, improved   Possible pneumonia. Possible COVID infection, negative testing so far  Acute respiratory failure with hypoxemia  Former Smoker      PCI with [place] LATONYA LAD   PCI with [facility] LATONYA LCX   PCI with [place] LATONYA RCA        Plan:     1.  BP too low today to add beta-blocker  2. Continue low dose lisinopril 2.5 mg daily. 3.  Continue dual antiplatelet therapy (ASA, clopidogrel). 4.  Continue statin. 5.  2nd COVID swab result pending. First was negative. [x]           High complexity decision making was performed. Direct delivery of medical care for this critically ill patient was provided with a time of 35 minutes. A critical illness acutely impairs one or more vital organ systems, which means that a patient's condition has a high probability of imminent or life-threatening deterioration. Subjective:   Tired. No chest pain, dizziness, syncope, palpitations.     Past Medical History:   Diagnosis Date    Arthritis     was in legs    Hypertension     Other ill-defined conditions(799.89)     extremety vascular disease    [facility]ipheral [facility] [address]     PVD (peripheral vascular disease) ([facility] [address]       Past Surgical History:   Procedure Laterality Date    COLONOSCOPY N/A 2018    COLONOSCOPY performed by Sayda Martin MD at hospitals ENDOSCOPY    HX AMPUTATION      Right Above Knee Amputation    HX AMPUTATION  2011    Left Below Knee Amputation    HX ORTHOPAEDIC  12     Debridement Left BKA Stump and Placement of Wound VA    HX OTHER SURGICAL  10/24/12    SPLIT THICKNESS SKIN GRAFT FROM RIGHT THIGH TO BELOW KNEE AMPUTATION STUMP (to wound left BKA stump    VEIN BYPASS GRAFT,FEM-TIBIAL  3/04/2011    Left femoral post. tibial bypass with vein     Allergies   Allergen Reactions    Percocet [Oxycodone-Acetaminophen] Nausea and Vomiting      Meds:  See below    Social History     Tobacco Use    Smoking status: Former Smoker     Last attempt to quit: 2008     Years since quittin.8    Smokeless tobacco: Never Used   Substance Use Topics    Alcohol use: No      Family History   Problem Relation Age of Onset    Hypertension Mother     Hypertension Father     Diabetes Sister     Diabetes Brother        REVIEW OF SYSTEMS:     []            Unable to obtain  ROS due to ---   [x]            Total of 12 systems reviewed as follows:    Constitutional: negative fever, negative chills, negative weight loss  Eyes:   negative visual changes  ENT:   negative sore throat, tongue or lip swelling  Respiratory:  negative cough, negative dyspnea  Cards:  negative for chest pain, palpitations, lower extremity edema  GI:   negative for nausea, vomiting, diarrhea, and abdominal pain  Genitourinary: negative for frequency, dysuria  Integument:  negative for rash   Hematologic:  negative for easy bruising and gum/nose bleeding  Musculoskel: negative for myalgias,  back pain  Neurological:  negative for headaches, dizziness, vertigo, weakness  Behavl/Psych: negative for feelings of anxiety, depression     Pertinent Positives include :    Objective:      Physical Exam:    Last 24hrs VS reviewed since prior progress note.  Most recent are:    Visit Vitals  /59 (BP 1 Location: Left arm, BP Patient Position: At rest;Sitting)   Pulse 73   Temp 97.5 °F (36.4 °C)   Resp 21   Ht 4' 2\" (1.27 m)   Wt 61.2 kg (134 lb 14.7 oz)   LMP  (LMP Unknown)   SpO2 99%   BMI 37.94 kg/m²       Intake/Output Summary (Last 24 hours) at 2020  Last data filed at 2020 1616  Gross per 24 hour   Intake 1740 ml   Output 985 ml   Net 755 ml        General Appearance: Well developed, well nourished, alert & oriented x 3, no acute distress. Ears/Nose/Mouth/Throat: Pupils equal and round, Hearing grossly normal.  Neck: Supple. JVP within normal limits. Carotids good upstrokes, with no bruit. Chest: Lungs clear to auscultation bilaterally. Cardiovascular: Regular rate and rhythm, S1S2 normal, no murmur, rubs, gallops. Abdomen: Soft, non-tender, bowel sounds are active. No organomegaly. Extremities: No edema bilaterally. Amputations noted. Skin: Warm and dry. Neuro: CN II-XII grossly intact, Strength and sensation grossly intact. Data:      Prior to Admission medications    Medication Sig Start Date End Date Taking? Authorizing Provider   atorvastatin (Lipitor) 40 mg tablet Take 40 mg by mouth daily. Yes Other, MD Stephani   hydrALAZINE (APRESOLINE) 25 mg tablet Take 25 mg by mouth two (2) times a day. Yes Provider, Historical   lisinopril (PRINIVIL, ZESTRIL) 40 mg tablet Take 40 mg by mouth daily. Yes Provider, Historical   ferrous sulfate 325 mg (65 mg iron) tablet Take 325 mg by mouth two (2) times daily (with meals). Yes Provider, Historical   hydrochlorothiazide (HYDRODIURIL) 25 mg tablet Take 1 Tab by mouth daily. 3/14/11  Yes Luisa Hogan MD   amlodipine (NORVASC) 10 mg tablet Take 1 Tab by mouth daily. 3/14/11  Yes Luisa Hogan MD   potassium chloride (K-DUR) 20 mEq tablet Take 1 Tab by mouth daily. 3/14/11  Yes Luisa Hogan MD   HYDROmorphone (DILAUDID) 4 mg tablet Take 1 Tab by mouth every four (4) hours as needed for Pain. 10/24/12   Lalito Montilla MD   GABAPENTIN (NEURONTIN PO) Take 600 mg by mouth three (3) times daily.     Provider, Historical       Recent Results (from the past 24 hour(s))   METABOLIC PANEL, BASIC    Collection Time: 05/09/20  4:39 AM   Result Value Ref Range    Sodium 131 (L) 136 - 145 mmol/L    Potassium 4.1 3.5 - 5.1 mmol/L    Chloride 100 97 - 108 mmol/L    CO2 21 21 - 32 mmol/L    Anion gap 10 5 - 15 mmol/L    Glucose 75 65 - 100 mg/dL    BUN 34 (H) 6 - 20 MG/DL    Creatinine 1.15 (H) 0.55 - 1.02 MG/DL    BUN/Creatinine ratio 30 (H) 12 - 20      GFR est AA 56 (L) >60 ml/min/1.73m2    GFR est non-AA 46 (L) >60 ml/min/1.73m2    Calcium 7.9 (L) 8.5 - 10.1 MG/DL   CBC WITH AUTOMATED DIFF    Collection Time: 05/09/20  4:39 AM   Result Value Ref Range    WBC 7.9 3.6 - 11.0 K/uL    RBC 3.22 (L) 3.80 - 5.20 M/uL    HGB 8.0 (L) 11.5 - 16.0 g/dL    HCT 22.9 (L) 35.0 - 47.0 %    MCV 71.1 (L) 80.0 - 99.0 FL    MCH 24.8 (L) 26.0 - 34.0 PG    MCHC 34.9 30.0 - 36.5 g/dL    RDW 13.7 11.5 - 14.5 %    PLATELET 377 609 - 367 K/uL    MPV 11.4 8.9 - 12.9 FL    NRBC 0.0 0  WBC    ABSOLUTE NRBC 0.00 0.00 - 0.01 K/uL    NEUTROPHILS 65 32 - 75 %    LYMPHOCYTES 17 12 - 49 %    MONOCYTES 17 (H) 5 - 13 %    EOSINOPHILS 0 0 - 7 %    BASOPHILS 0 0 - 1 %    IMMATURE GRANULOCYTES 1 (H) 0.0 - 0.5 %    ABS. NEUTROPHILS 5.2 1.8 - 8.0 K/UL    ABS. LYMPHOCYTES 1.3 0.8 - 3.5 K/UL    ABS. MONOCYTES 1.3 (H) 0.0 - 1.0 K/UL    ABS. EOSINOPHILS 0.0 0.0 - 0.4 K/UL    ABS. BASOPHILS 0.0 0.0 - 0.1 K/UL    ABS. IMM.  GRANS. 0.1 (H) 0.00 - 0.04 K/UL    DF AUTOMATED     TROPONIN I    Collection Time: 05/09/20  4:39 AM   Result Value Ref Range    Troponin-I, Qt. 10.80 (H) <0.05 ng/mL   EKG, 12 LEAD, SUBSEQUENT    Collection Time: 05/09/20  5:15 AM   Result Value Ref Range    Ventricular Rate 71 BPM    Atrial Rate 71 BPM    P-R Interval 176 ms    QRS Duration 100 ms    Q-T Interval 426 ms    QTC Calculation (Bezet) 462 ms    Calculated P Axis 51 degrees    Calculated R Axis -49 degrees    Calculated T Axis 119 degrees    Diagnosis       Normal sinus rhythm  Possible Left atrial enlargement  Left anterior fascicular block  Septal infarct , age undetermined  ST & T wave abnormality, consider anterolateral ischemia  Abnormal ECG  When compared with ECG of 08-MAY-2020 07:06,  MANUAL COMPARISON REQUIRED, DATA IS UNCONFIRMED  Confirmed by Amarilys Melgar ([id number]) on 5/9/2020 1:03:58 PM     SARS-COV-2    Collection Time: 05/09/20 11:37 AM   Result Value Ref Range    Specimen source Nasopharyngeal      SARS-CoV-2 PENDING        Signed By: Tenisha Mendoza MD     May 9, 2020

## 2020-05-10 LAB
ANION GAP SERPL CALC-SCNC: 7 MMOL/L (ref 5–15)
BASOPHILS # BLD: 0 K/UL (ref 0–0.1)
BASOPHILS NFR BLD: 0 % (ref 0–1)
BNP SERPL-MCNC: ABNORMAL PG/ML
BUN SERPL-MCNC: 34 MG/DL (ref 6–20)
BUN/CREAT SERPL: 32 (ref 12–20)
CALCIUM SERPL-MCNC: 8.3 MG/DL (ref 8.5–10.1)
CHLORIDE SERPL-SCNC: 98 MMOL/L (ref 97–108)
CO2 SERPL-SCNC: 25 MMOL/L (ref 21–32)
CREAT SERPL-MCNC: 1.05 MG/DL (ref 0.55–1.02)
DIFFERENTIAL METHOD BLD: ABNORMAL
EOSINOPHIL # BLD: 0.1 K/UL (ref 0–0.4)
EOSINOPHIL NFR BLD: 1 % (ref 0–7)
ERYTHROCYTE [DISTWIDTH] IN BLOOD BY AUTOMATED COUNT: 13.7 % (ref 11.5–14.5)
GLUCOSE SERPL-MCNC: 61 MG/DL (ref 65–100)
HCT VFR BLD AUTO: 29.1 % (ref 35–47)
HGB BLD-MCNC: 10 G/DL (ref 11.5–16)
IMM GRANULOCYTES # BLD AUTO: 0 K/UL (ref 0–0.04)
IMM GRANULOCYTES NFR BLD AUTO: 0 % (ref 0–0.5)
LYMPHOCYTES # BLD: 2.2 K/UL (ref 0.8–3.5)
LYMPHOCYTES NFR BLD: 28 % (ref 12–49)
MAGNESIUM SERPL-MCNC: 2.3 MG/DL (ref 1.6–2.4)
MCH RBC QN AUTO: 24.6 PG (ref 26–34)
MCHC RBC AUTO-ENTMCNC: 34.4 G/DL (ref 30–36.5)
MCV RBC AUTO: 71.5 FL (ref 80–99)
MONOCYTES # BLD: 1.3 K/UL (ref 0–1)
MONOCYTES NFR BLD: 16 % (ref 5–13)
NEUTS SEG # BLD: 4.4 K/UL (ref 1.8–8)
NEUTS SEG NFR BLD: 55 % (ref 32–75)
NRBC # BLD: 0 K/UL (ref 0–0.01)
NRBC BLD-RTO: 0 PER 100 WBC
PHOSPHATE SERPL-MCNC: 3.1 MG/DL (ref 2.6–4.7)
PLATELET # BLD AUTO: 250 K/UL (ref 150–400)
PMV BLD AUTO: 10.7 FL (ref 8.9–12.9)
POTASSIUM SERPL-SCNC: 3.8 MMOL/L (ref 3.5–5.1)
RBC # BLD AUTO: 4.07 M/UL (ref 3.8–5.2)
SARS-COV-2, COV2: NOT DETECTED
SODIUM SERPL-SCNC: 130 MMOL/L (ref 136–145)
SPECIMEN SOURCE, FCOV2M: NORMAL
TROPONIN I SERPL-MCNC: 6.2 NG/ML
WBC # BLD AUTO: 8 K/UL (ref 3.6–11)

## 2020-05-10 PROCEDURE — 36415 COLL VENOUS BLD VENIPUNCTURE: CPT

## 2020-05-10 PROCEDURE — 83880 ASSAY OF NATRIURETIC PEPTIDE: CPT

## 2020-05-10 PROCEDURE — 74011000258 HC RX REV CODE- 258: Performed by: INTERNAL MEDICINE

## 2020-05-10 PROCEDURE — 84100 ASSAY OF PHOSPHORUS: CPT

## 2020-05-10 PROCEDURE — 83735 ASSAY OF MAGNESIUM: CPT

## 2020-05-10 PROCEDURE — 80048 BASIC METABOLIC PNL TOTAL CA: CPT

## 2020-05-10 PROCEDURE — 74011250637 HC RX REV CODE- 250/637: Performed by: INTERNAL MEDICINE

## 2020-05-10 PROCEDURE — 65660000000 HC RM CCU STEPDOWN

## 2020-05-10 PROCEDURE — 85025 COMPLETE CBC W/AUTO DIFF WBC: CPT

## 2020-05-10 PROCEDURE — 74011250636 HC RX REV CODE- 250/636: Performed by: INTERNAL MEDICINE

## 2020-05-10 PROCEDURE — 84484 ASSAY OF TROPONIN QUANT: CPT

## 2020-05-10 RX ORDER — CARVEDILOL 3.12 MG/1
3.12 TABLET ORAL 2 TIMES DAILY WITH MEALS
Status: DISCONTINUED | OUTPATIENT
Start: 2020-05-10 | End: 2020-05-11 | Stop reason: HOSPADM

## 2020-05-10 RX ADMIN — CEFTRIAXONE SODIUM 1 G: 1 INJECTION, POWDER, FOR SOLUTION INTRAMUSCULAR; INTRAVENOUS at 11:38

## 2020-05-10 RX ADMIN — ATORVASTATIN CALCIUM 40 MG: 40 TABLET, FILM COATED ORAL at 08:47

## 2020-05-10 RX ADMIN — AZITHROMYCIN MONOHYDRATE 500 MG: 500 INJECTION, POWDER, LYOPHILIZED, FOR SOLUTION INTRAVENOUS at 11:45

## 2020-05-10 RX ADMIN — CARVEDILOL 3.12 MG: 3.12 TABLET, FILM COATED ORAL at 08:47

## 2020-05-10 RX ADMIN — LISINOPRIL 2.5 MG: 5 TABLET ORAL at 08:47

## 2020-05-10 RX ADMIN — CARVEDILOL 3.12 MG: 3.12 TABLET, FILM COATED ORAL at 18:21

## 2020-05-10 RX ADMIN — Medication 10 ML: at 21:42

## 2020-05-10 RX ADMIN — ASPIRIN 81 MG 81 MG: 81 TABLET ORAL at 08:47

## 2020-05-10 RX ADMIN — Medication 30 ML: at 14:00

## 2020-05-10 RX ADMIN — GABAPENTIN 600 MG: 300 CAPSULE ORAL at 21:42

## 2020-05-10 RX ADMIN — GABAPENTIN 600 MG: 300 CAPSULE ORAL at 14:00

## 2020-05-10 RX ADMIN — Medication 10 ML: at 11:39

## 2020-05-10 RX ADMIN — CLOPIDOGREL BISULFATE 75 MG: 75 TABLET ORAL at 08:47

## 2020-05-10 RX ADMIN — Medication 20 ML: at 05:08

## 2020-05-10 RX ADMIN — GABAPENTIN 600 MG: 300 CAPSULE ORAL at 05:08

## 2020-05-10 NOTE — PROGRESS NOTES
5/10/2020     INTENSIVIST PROGRESS NOTE:     5/10  Weaned down to RA  COVID 19 test is negative today  Hemodynamically stable  For transfer to [facility]- per [facility]  Repeat CXR tomorrow- if clear, stop antibiotics    5/9  Medical records and data reviewed  On Droplet plus isolation pending SARS CoV 2 results  Visual exam to conserve PPE (small N95 not available on unit)  Cath findings noted- LVEDP of 40  On empiric abx for patchy asymmetric ASD  O2 requirement less-- on room air  SBP ~ 836 systolic, heart failure meds were started today  Low grade fever on admission      5/8  Patient seen and evaluated, chart reviewed   77 yo female presented with chest tightness, sob, cough  Found to have a STEMI  Taken to the cath lab, revealing severe 3V CAD, underwent PTCA and multiple stents placement  Now pt in CCU hypoxic, in shock, on pressors  Critically ill    ROS: sob, chest tightness, cough    Visit Vitals  /42   Pulse 78   Temp 98.7 °F (37.1 °C)   Resp 15   Ht 4' 2\" (1.27 m)   Wt 61.2 kg (134 lb 14.7 oz)   SpO2 98%   BMI 37.94 kg/m²     Limited visual exam given Droplet isolation when she was seen earlier today  General: awake  Lungs: equal expansion  Ext: s/p bilat AKA  Skin: no rash  Musculoskeletal: bilat AKA  Neuro: awake    CXR: pulmonary edema  Chest ct: bilateral pleural effusions, bibasilar asdz vs atx    Labs reviewed    A/P:  - acute respiratory failure: O2 to keep sats > 90%  - STEMI: s/p cath.  HF meds being adjusted  - acute pulmonary edema: diurese as tolerated  - will add empiric abx to cover for CAP-- SARS CoV 2, resp PCR and sputum cx pending: repeat CXR tomorrow, if infiltrates have cleared, stop abx  - BONNIE: monitor creatinine  - shock: off pressors  - DVT prophylxis  - For transfer to tele  - D/W LUCIE Lou MD

## 2020-05-10 NOTE — ROUTINE PROCESS
TRANSFER - IN REPORT: 
 
Verbal report received from [facility] RN(name) on Sanjuanita Bundy  being received from CCU(unit) for routine progression of care Report consisted of patients Situation, Background, Assessment and  
Recommendations(SBAR). Information from the following report(s) SBAR, Kardex, MAR, Accordion, Recent Results and Cardiac Rhythm NSR was reviewed with the receiving nurse. Opportunity for questions and clarification was provided. Assessment completed upon patients arrival to unit and care assumed.

## 2020-05-10 NOTE — PROGRESS NOTES
0730 Bedside and Verbal shift change report given to Faizan Schilling (oncoming nurse) by Toñito Benson (offgoing nurse). Report included the following information SBAR, Kardex, ED Summary, Procedure Summary, Intake/Output, MAR, Recent Results and Cardiac Rhythm NSR.   0800 pt assessed per flowsheet, eating a cardiac diet well- does not \"hospital food\"- cardiac diet. Pt denies pain, pressure, SOB or any abnormal sensations, voicing that she feels quite ready to go home today. Reviewed plan of care. 46 Dr Frandy Robert in to assess pt, reviewed plan of care. Inquiry made in reference to possible to transfer out of CCU- this has been deferred to cardiology. Reviewed new medications with handouts provided, no further questions voiced  0945 spoke with Dr Fabricio Kaufman. Based on report and pt condition, received new order to transfer to step down  1010 bone has been d/c'd, pt aware of pending transfer. Pt tearful r/t not being able to go home today. Encouragement and support provided. 1200 pt reassessed per flowsheet. Pt was asleep upon my entry to her room. As soon as she stated that she wanted to go home and I reminded her that we have to await clearance from the Doctors before she can go home, pt started crying again. She is pleasant and cooperative, just really wants to go home. 1335 TRANSFER - OUT REPORT:    Verbal report given to Lisette(name) on Jasen Rhoades  being transferred to PCU(unit) for routine progression of care       Report consisted of patients Situation, Background, Assessment and   Recommendations(SBAR). Information from the following report(s) SBAR, Kardex, ED Summary, Procedure Summary, Intake/Output, MAR, Recent Results and Cardiac Rhythm NSR was reviewed with the receiving nurse.     Lines:   Peripheral IV 05/08/20 Right Antecubital (Active)   Site Assessment Clean, dry, & intact 5/10/2020 12:00 PM   Phlebitis Assessment 0 5/10/2020 12:00 PM   Infiltration Assessment 0 5/10/2020 12:00 PM   Dressing Status Clean, dry, & intact 5/10/2020 12:00 PM   Dressing Type Transparent;Tape 5/10/2020 12:00 PM   Hub Color/Line Status Green;Flushed;Capped 5/10/2020 12:00 PM   Action Taken Open ports on tubing capped 5/10/2020 12:00 PM   Alcohol Cap Used Yes 5/10/2020 12:00 PM       Peripheral IV 05/08/20 Left Antecubital (Active)   Site Assessment Clean, dry, & intact 5/10/2020 12:00 PM   Phlebitis Assessment 0 5/10/2020 12:00 PM   Infiltration Assessment 0 5/10/2020 12:00 PM   Dressing Status Clean, dry, & intact 5/10/2020 12:00 PM   Dressing Type Transparent;Tape 5/10/2020 12:00 PM   Hub Color/Line Status Pink;Flushed;Capped 5/10/2020 12:00 PM   Action Taken Open ports on tubing capped 5/10/2020 12:00 PM   Alcohol Cap Used Yes 5/10/2020 12:00 PM        Opportunity for questions and clarification was provided. Patient transported with:   Monitor  Registered Nurse   1430 pt able to void with large soft BM.  Pt transferred to room 2269, Dr Neil Low made aware of transfer

## 2020-05-10 NOTE — PROGRESS NOTES
2000, Bedside and Verbal shift change report given to Jose Lagos RN (oncoming nurse) by Patricia Laguna RN (offgoing nurse). Report included the following information SBAR, Kardex, Intake/Output, MAR, Accordion, Recent Results, Med Rec Status and Cardiac Rhythm NSR. Temp;  Afebrile  Neuro; Alert & Oriented by 4, follow command, eyes contact, pupils   GCS;  Eye; 4, verbal; 5, motor; 6. Anxious want to leave home > explained to her that she need to be in Hospital for few days after MI. Reassessment; No changes  Respiratory; Sat 98% on RA, diminished lung sounds. Reassessment; No changes  Cardiac; NSR with PVCs, 77 B/min, NIBP 98/43 mmHg,   Reassessment;  BP better at am  GI; Cardiac Diet > poor appetite, Abd obese semi soft with active bowel sound,   Reassessment; snack & juice provided at am for Low Glucose   Renal; bone cath with adequate urine out put clear yellow. Reassessment; Intake/Output Summary (Last 24 hours) at 5/10/2020 0742  Last data filed at 5/10/2020 0500  Gross per 24 hour   Intake 940 ml   Output 1160 ml   Net -220 ml     Last 3 Recorded Weights in this Encounter    05/08/20 1551   Weight: 61.2 kg (134 lb 14.7 oz)     Skin; intact > right AKA, left BKA. Endo; Glucose at am Lab; 61 > juice and snack provided,   Lines; PIV X 2, Bone. Code; Full. Lab;  HGB; 10.1, platelet; 818, Na; 147, K; 3.8, BUN; 34, Crea; 1.15 > V 1.05,  DVT; Plavix and aspirin > to follow with MD.  Plan;  Encourage feeding, check for any S&S of chest pain, pain management, follow lab tomorrow,    0700  Bedside and Verbal shift change report given to Bin Dozier RN (oncoming nurse) by Jose Lagos RN (offgoing nurse). Report included the following information SBAR, Kardex, Intake/Output, MAR, Accordion, Recent Results, Med Rec Status and Cardiac Rhythm NSR.

## 2020-05-10 NOTE — PROGRESS NOTES
Progress Note    NAME: Rory Lares   :  1945   MRN:  [id number]     Date/Time:  5/10/2020    Patient PCP: Ngozi Kelly MD  ________________________________________________________________________     Assessment:     Anterior STEMI, see below for PCI targets  Multivessel CAD   Cardiogenic shock , requiring pressor support, now off pressor   Peripheral Artery Disease   S/p R AKA   S/p L BKA   Pulmonary edema, improved   Possible pneumonia. Possible COVID infection, negative testing x 2  Acute respiratory failure with hypoxemia  Former Smoker      PCI with [place] LATONYA LAD   PCI with Onxy LATONYA LCX   PCI with [place] LATONYA RCA        Plan:     1. Beta-blocker carvedilol 3.125 mg po BID was added today. 2.  Continue low dose lisinopril 2.5 mg daily. 3.  Continue dual antiplatelet therapy (ASA, clopidogrel). 4.  Continue statin. 5.  2nd COVID swab negative on  as was the first from . [x]           High complexity decision making was performed. Subjective:   No chest pain, dizziness, syncope, palpitations. Milinda Narrow to go home.     Past Medical History:   Diagnosis Date    Arthritis     was in legs    Hypertension     Other ill-defined conditions(799.89)     extremety vascular disease    [facility] [address] [facility]     PVD (peripheral vascular disease) ([facility] [address] [facility]       Past Surgical History:   Procedure Laterality Date    COLONOSCOPY N/A 2018    COLONOSCOPY performed by Vernon Waldron MD at hospitals ENDOSCOPY    HX AMPUTATION      Right Above Knee Amputation    HX AMPUTATION  2011    Left Below Knee Amputation    HX ORTHOPAEDIC  12     Debridement Left BKA Stump and Placement of Wound VA    HX OTHER SURGICAL  10/24/12    SPLIT THICKNESS SKIN GRAFT FROM RIGHT THIGH TO BELOW KNEE AMPUTATION STUMP (to wound left BKA stump    VEIN BYPASS GRAFT,FEM-TIBIAL  3/04/2011    Left femoral post. tibial bypass with vein     Allergies   Allergen Reactions    Percocet [Oxycodone-Acetaminophen] Nausea and Vomiting      Meds:  See below    Social History     Tobacco Use    Smoking status: Former Smoker     Last attempt to quit: 2008     Years since quittin.8    Smokeless tobacco: Never Used   Substance Use Topics    Alcohol use: No      Family History   Problem Relation Age of Onset    Hypertension Mother     Hypertension Father     Diabetes Sister     Diabetes Brother        REVIEW OF SYSTEMS:     []            Unable to obtain  ROS due to ---   [x]            Total of 12 systems reviewed as follows:    Constitutional: negative fever, negative chills, negative weight loss  Eyes:   negative visual changes  ENT:   negative sore throat, tongue or lip swelling  Respiratory:  negative cough, negative dyspnea  Cards:  negative for chest pain, palpitations, lower extremity edema  GI:   negative for nausea, vomiting, diarrhea, and abdominal pain  Genitourinary: negative for frequency, dysuria  Integument:  negative for rash   Hematologic:  negative for easy bruising and gum/nose bleeding  Musculoskel: negative for myalgias,  back pain  Neurological:  negative for headaches, dizziness, vertigo, weakness  Behavl/Psych: negative for feelings of anxiety, depression     Pertinent Positives include :    Objective:      Physical Exam:    Last 24hrs VS reviewed since prior progress note. Most recent are:    Visit Vitals  /42   Pulse 78   Temp 98.7 °F (37.1 °C)   Resp 15   Ht 4' 2\" (1.27 m)   Wt 61.2 kg (134 lb 14.7 oz)   LMP  (LMP Unknown)   SpO2 98%   BMI 37.94 kg/m²       Intake/Output Summary (Last 24 hours) at 5/10/2020 1435  Last data filed at 5/10/2020 1200  Gross per 24 hour   Intake 1240 ml   Output 925 ml   Net 315 ml        General Appearance: Well developed, well nourished, alert & oriented x 3, no acute distress. Ears/Nose/Mouth/Throat: Pupils equal and round, Hearing grossly normal.  Neck: Supple. JVP within normal limits.  Carotids good upstrokes, with no bruit.  Chest: Lungs clear to auscultation bilaterally. Cardiovascular: Regular rate and rhythm, S1S2 normal, no murmur, rubs, gallops. Abdomen: Soft, non-tender, bowel sounds are active. No organomegaly. Extremities: No edema bilaterally. Amputations noted. Skin: Warm and dry. Neuro: CN II-XII grossly intact, Strength and sensation grossly intact. Data:      Prior to Admission medications    Medication Sig Start Date End Date Taking? Authorizing Provider   atorvastatin (Lipitor) 40 mg tablet Take 40 mg by mouth daily. Yes Other, MD Stephani   hydrALAZINE (APRESOLINE) 25 mg tablet Take 25 mg by mouth two (2) times a day. Yes Provider, Historical   lisinopril (PRINIVIL, ZESTRIL) 40 mg tablet Take 40 mg by mouth daily. Yes Provider, Historical   ferrous sulfate 325 mg (65 mg iron) tablet Take 325 mg by mouth two (2) times daily (with meals). Yes Provider, Historical   hydrochlorothiazide (HYDRODIURIL) 25 mg tablet Take 1 Tab by mouth daily. 3/14/11  Yes Jen Perkins MD   amlodipine (NORVASC) 10 mg tablet Take 1 Tab by mouth daily. 3/14/11  Yes Jen Perkins MD   potassium chloride (K-DUR) 20 mEq tablet Take 1 Tab by mouth daily. 3/14/11  Yes Jen Perkins MD   HYDROmorphone (DILAUDID) 4 mg tablet Take 1 Tab by mouth every four (4) hours as needed for Pain. 10/24/12   Guido Varner MD   GABAPENTIN (NEURONTIN PO) Take 600 mg by mouth three (3) times daily.     Provider, Historical       Current Facility-Administered Medications:     carvediloL (COREG) tablet 3.125 mg, 3.125 mg, Oral, BID WITH MEALS, Balbir Doyle MD, 3.125 mg at 05/10/20 0847    lisinopriL (PRINIVIL, ZESTRIL) tablet 2.5 mg, 2.5 mg, Oral, DAILY, Balbir Doyle MD, 2.5 mg at 05/10/20 0847    atorvastatin (LIPITOR) tablet 40 mg, 40 mg, Oral, DAILY, Balbir Doyle MD, 40 mg at 05/10/20 0847    gabapentin (NEURONTIN) capsule 600 mg, 600 mg, Oral, Q8H, Balbir Doyle MD, 600 mg at 05/10/20 5518   sodium chloride (NS) flush 5-40 mL, 5-40 mL, IntraVENous, Q8H, Divya Doyle MD, 20 mL at 05/10/20 0508    sodium chloride (NS) flush 5-40 mL, 5-40 mL, IntraVENous, PRN, Divya Doyle MD, 10 mL at 05/10/20 1139    aspirin chewable tablet 81 mg, 81 mg, Oral, DAILY, Divya Doyle MD, 81 mg at 05/10/20 0847    LORazepam (ATIVAN) tablet 1 mg, 1 mg, Oral, BID PRN, Yasmany Scott MD, 1 mg at 05/09/20 1406    zolpidem (AMBIEN) tablet 5 mg, 5 mg, Oral, QHS PRN, Divya Doyle MD    clopidogreL (PLAVIX) tablet 75 mg, 75 mg, Oral, DAILY, Divya Doyle MD, 75 mg at 05/10/20 0847    cefTRIAXone (ROCEPHIN) 1 g in 0.9% sodium chloride (MBP/ADV) 50 mL, 1 g, IntraVENous, Q24H, Marylen Neighbors, MD, Last Rate: 100 mL/hr at 05/10/20 1138, 1 g at 05/10/20 1138    azithromycin (ZITHROMAX) 500 mg in 0.9% sodium chloride (MBP/ADV) 250 mL, 500 mg, IntraVENous, Q24H, Marylen Neighbors, MD, Last Rate: 250 mL/hr at 05/10/20 1145, 500 mg at 05/10/20 1145      Recent Results (from the past 24 hour(s))   CBC WITH AUTOMATED DIFF    Collection Time: 05/10/20  3:41 AM   Result Value Ref Range    WBC 8.0 3.6 - 11.0 K/uL    RBC 4.07 3.80 - 5.20 M/uL    HGB 10.0 (L) 11.5 - 16.0 g/dL    HCT 29.1 (L) 35.0 - 47.0 %    MCV 71.5 (L) 80.0 - 99.0 FL    MCH 24.6 (L) 26.0 - 34.0 PG    MCHC 34.4 30.0 - 36.5 g/dL    RDW 13.7 11.5 - 14.5 %    PLATELET 794 909 - 733 K/uL    MPV 10.7 8.9 - 12.9 FL    NRBC 0.0 0  WBC    ABSOLUTE NRBC 0.00 0.00 - 0.01 K/uL    NEUTROPHILS 55 32 - 75 %    LYMPHOCYTES 28 12 - 49 %    MONOCYTES 16 (H) 5 - 13 %    EOSINOPHILS 1 0 - 7 %    BASOPHILS 0 0 - 1 %    IMMATURE GRANULOCYTES 0 0.0 - 0.5 %    ABS. NEUTROPHILS 4.4 1.8 - 8.0 K/UL    ABS. LYMPHOCYTES 2.2 0.8 - 3.5 K/UL    ABS. MONOCYTES 1.3 (H) 0.0 - 1.0 K/UL    ABS. EOSINOPHILS 0.1 0.0 - 0.4 K/UL    ABS. BASOPHILS 0.0 0.0 - 0.1 K/UL    ABS. IMM.  GRANS. 0.0 0.00 - 0.04 K/UL    DF AUTOMATED     METABOLIC PANEL, BASIC Collection Time: 05/10/20  3:41 AM   Result Value Ref Range    Sodium 130 (L) 136 - 145 mmol/L    Potassium 3.8 3.5 - 5.1 mmol/L    Chloride 98 97 - 108 mmol/L    CO2 25 21 - 32 mmol/L    Anion gap 7 5 - 15 mmol/L    Glucose 61 (L) 65 - 100 mg/dL    BUN 34 (H) 6 - 20 MG/DL    Creatinine 1.05 (H) 0.55 - 1.02 MG/DL    BUN/Creatinine ratio 32 (H) 12 - 20      GFR est AA >60 >60 ml/min/1.73m2    GFR est non-AA 51 (L) >60 ml/min/1.73m2    Calcium 8.3 (L) 8.5 - 10.1 MG/DL   NT-PRO BNP    Collection Time: 05/10/20  3:41 AM   Result Value Ref Range    NT pro-BNP 15,794 (H) <450 PG/ML   TROPONIN I    Collection Time: 05/10/20  3:41 AM   Result Value Ref Range    Troponin-I, Qt. 6.20 (H) <0.05 ng/mL   MAGNESIUM    Collection Time: 05/10/20  3:41 AM   Result Value Ref Range    Magnesium 2.3 1.6 - 2.4 mg/dL   PHOSPHORUS    Collection Time: 05/10/20  3:41 AM   Result Value Ref Range    Phosphorus 3.1 2.6 - 4.7 MG/DL       Signed By: Sage Dietrich MD     May 10, 2020

## 2020-05-11 VITALS
SYSTOLIC BLOOD PRESSURE: 122 MMHG | RESPIRATION RATE: 18 BRPM | BODY MASS INDEX: 31.22 KG/M2 | TEMPERATURE: 97.9 F | HEIGHT: 55 IN | OXYGEN SATURATION: 98 % | WEIGHT: 134.92 LBS | DIASTOLIC BLOOD PRESSURE: 62 MMHG | HEART RATE: 85 BPM

## 2020-05-11 LAB
ANION GAP SERPL CALC-SCNC: 6 MMOL/L (ref 5–15)
BASOPHILS # BLD: 0 K/UL (ref 0–0.1)
BASOPHILS NFR BLD: 0 % (ref 0–1)
BUN SERPL-MCNC: 22 MG/DL (ref 6–20)
BUN/CREAT SERPL: 27 (ref 12–20)
CALCIUM SERPL-MCNC: 8.5 MG/DL (ref 8.5–10.1)
CHLORIDE SERPL-SCNC: 101 MMOL/L (ref 97–108)
CO2 SERPL-SCNC: 25 MMOL/L (ref 21–32)
CREAT SERPL-MCNC: 0.83 MG/DL (ref 0.55–1.02)
DIFFERENTIAL METHOD BLD: ABNORMAL
EOSINOPHIL # BLD: 0.1 K/UL (ref 0–0.4)
EOSINOPHIL NFR BLD: 2 % (ref 0–7)
ERYTHROCYTE [DISTWIDTH] IN BLOOD BY AUTOMATED COUNT: 13.8 % (ref 11.5–14.5)
GLUCOSE SERPL-MCNC: 72 MG/DL (ref 65–100)
HCT VFR BLD AUTO: 28.1 % (ref 35–47)
HGB BLD-MCNC: 9.4 G/DL (ref 11.5–16)
IMM GRANULOCYTES # BLD AUTO: 0 K/UL (ref 0–0.04)
IMM GRANULOCYTES NFR BLD AUTO: 0 % (ref 0–0.5)
LYMPHOCYTES # BLD: 1.7 K/UL (ref 0.8–3.5)
LYMPHOCYTES NFR BLD: 24 % (ref 12–49)
MCH RBC QN AUTO: 24.4 PG (ref 26–34)
MCHC RBC AUTO-ENTMCNC: 33.5 G/DL (ref 30–36.5)
MCV RBC AUTO: 72.8 FL (ref 80–99)
MONOCYTES # BLD: 1.2 K/UL (ref 0–1)
MONOCYTES NFR BLD: 17 % (ref 5–13)
NEUTS SEG # BLD: 4.1 K/UL (ref 1.8–8)
NEUTS SEG NFR BLD: 57 % (ref 32–75)
NRBC # BLD: 0 K/UL (ref 0–0.01)
NRBC BLD-RTO: 0 PER 100 WBC
PLATELET # BLD AUTO: 261 K/UL (ref 150–400)
PMV BLD AUTO: 10.9 FL (ref 8.9–12.9)
POTASSIUM SERPL-SCNC: 3.8 MMOL/L (ref 3.5–5.1)
RBC # BLD AUTO: 3.86 M/UL (ref 3.8–5.2)
SODIUM SERPL-SCNC: 132 MMOL/L (ref 136–145)
WBC # BLD AUTO: 7.1 K/UL (ref 3.6–11)

## 2020-05-11 PROCEDURE — 74011250637 HC RX REV CODE- 250/637: Performed by: INTERNAL MEDICINE

## 2020-05-11 PROCEDURE — 36415 COLL VENOUS BLD VENIPUNCTURE: CPT

## 2020-05-11 PROCEDURE — 85025 COMPLETE CBC W/AUTO DIFF WBC: CPT

## 2020-05-11 PROCEDURE — 80048 BASIC METABOLIC PNL TOTAL CA: CPT

## 2020-05-11 RX ORDER — LISINOPRIL 5 MG/1
5 TABLET ORAL DAILY
Status: DISCONTINUED | OUTPATIENT
Start: 2020-05-11 | End: 2020-05-11 | Stop reason: HOSPADM

## 2020-05-11 RX ORDER — CARVEDILOL 3.12 MG/1
3.12 TABLET ORAL 2 TIMES DAILY WITH MEALS
Qty: 60 TAB | Refills: 12 | Status: ON HOLD | OUTPATIENT
Start: 2020-05-11 | End: 2021-03-28

## 2020-05-11 RX ORDER — LISINOPRIL 5 MG/1
10 TABLET ORAL DAILY
Qty: 30 TAB | Refills: 12 | Status: ON HOLD | OUTPATIENT
Start: 2020-05-11 | End: 2021-03-28

## 2020-05-11 RX ORDER — CLOPIDOGREL BISULFATE 75 MG/1
75 TABLET ORAL DAILY
Qty: 30 TAB | Refills: 12 | Status: SHIPPED | OUTPATIENT
Start: 2020-05-11

## 2020-05-11 RX ADMIN — CARVEDILOL 3.12 MG: 3.12 TABLET, FILM COATED ORAL at 08:28

## 2020-05-11 RX ADMIN — Medication 10 ML: at 06:36

## 2020-05-11 RX ADMIN — ASPIRIN 81 MG 81 MG: 81 TABLET ORAL at 08:28

## 2020-05-11 RX ADMIN — LISINOPRIL 5 MG: 5 TABLET ORAL at 08:28

## 2020-05-11 RX ADMIN — GABAPENTIN 600 MG: 300 CAPSULE ORAL at 06:36

## 2020-05-11 RX ADMIN — ATORVASTATIN CALCIUM 40 MG: 40 TABLET, FILM COATED ORAL at 08:28

## 2020-05-11 RX ADMIN — CLOPIDOGREL BISULFATE 75 MG: 75 TABLET ORAL at 08:28

## 2020-05-11 NOTE — DISCHARGE SUMMARY
[facility]    Name:  Qamar Larson  MR#:  [id number]  :  1945  ACCOUNT #:  [de-identified]  ADMIT DATE:  2020  DISCHARGE DATE:  2020      PRIMARY CARE:  Elana Gaviria MD    DISCHARGE DIAGNOSES:    1. Acute anterior wall ST elevation myocardial infarction. 2.  Acute systolic heart failure. 3.  Multivessel coronary artery atherosclerosis. 4.  Cardiogenic shock requiring pressor support. 5.  Peripheral arterial disease. 6.  Status post right above knee amputation. 7.  Status post left below knee amputation. 8.  Acute pulmonary edema. 9.  Acute respiratory failure with hypoxemia. 8.  Former smoker. CONSULTATIONS OBTAINED:  Jack Dumont MD, pulmonary intensive care. PROCEDURES:  1. Left heart catheterization, coronary artery angiography, left ventriculography. 2.  PCI with stenting using [place] LATONYA proximal left anterior descending artery. 3.  PCI with stenting using [place] LATONYA proximal circumflex artery. 4.  PCI with stenting [place] LATONYA proximal right coronary artery. DISCHARGE MEDICATIONS:  Carvedilol 3.125 mg b.i.d., Plavix 75 mg daily, lisinopril 10 mg daily, hydrochlorothiazide 25 mg daily, Lipitor 40 mg daily, Neurontin 600 mg t.i.d., potassium 20 mEq daily. ASA 81 mg daily. She was instructed to discontinue the following:  Amlodipine, iron and hydralazine. FOLLOWUP:  She is to see me in the office in 1 week. HISTORY AND PHYSICAL:  Please see the dictated history and physical.  Briefly she is a 72-year-old female with a history of vascular disease. She has the remote history of bilateral leg amputation. She had no previous history of heart disease. She presented to the emergency room by EMS. It was somewhat difficult to obtain an accurate history from her. She was in respiratory distress with acute and severe shortness of breath. Coughing.   She did complain of some tightness in the chest.    She was seen in the emergency room by the emergency room physician. Her initial EKG showed some ST elevation leads V1 and V2, but did not meet initial criteria for code STEMI. There was concern for possible pulmonary embolization and she had a CTA done, which did not show pulmonary emboli. It did however show pulmonary edema and pleural effusions. Her troponin level was elevated at 1.6. The CPK was elevated at 518 with a positive MB fraction. We were consulted. Arrangements were made to give the patient heparin, aspirin and she was taken directly to the cath lab for acute coronary intervention. HOSPITAL COURSE:  Catheterization showed severe coronary artery disease. In particular, the proximal left anterior descending artery had 99% stenosis with LIANA grade I flow. This was treated with PCI using two overlapping Martinez drug-eluting stents beginning at the ostium and extending down to the mid LAD to cover all areas of disease. The stents were dilated using 2.75 mm noncompliant balloon 15 atmospheres. There is 0% residual narrowing. Distal flow was LIANA grade III. The proximal circumflex artery had 80% stenosis. This was treated with an [place] LATONYA dilated 2.5 mm at 15 atmospheres. There is 0% residual with LIANA grade III flow. Next the proximal right coronary artery had 90%-95% stenosis. LIANA grade II flow initially. PCI was performed and two overlapping [place] LATONYA stents were deployed using 0% residual with again LIANA grade III flow on completion. Left ventriculography showed LVEF of 30%. The ventricular end-diastolic pressure was elevated at 40 mmHg. The patient was given intravenous Lasix. She was maintained on intravenous Seb-Synephrine throughout the procedure due to her cardiogenic shock. She was on a non-rebreather mask for oxygenation. She was transferred to intensive care unit. Once in the ICU following the intervention procedure, she tended to stabilize.   Blood pressure improved and we were able to wean her off the Seb-Synephrine. She did diurese quite profusely. Oxygenation improved and we were able to wean her from the oxygen mask to nasal cannula. The next couple days in the intensive care unit, she did show improvement. We were subsequently able to add a low-dose beta blocker and a low-ACE inhibitor to her medical regimen, which she did seem to tolerate. She was transferred to the PCU stepdown unit. She was initially tested for COVID-19 infection with the first swab in the emergency room on initial presentation. This resulted negative for COVID infection. She was retested in the intensive care unit and her second test was also negative for COVID infection. She was started on antibiotics empirically by Dr. Fortino Corona for possible pneumonia, although it was subsequently determined that the infiltrate seen on chest x-ray are most consistent with pulmonary edema rather than infection. She continued to improve and is discharged home today on the above medical regimen. We will see her back in the office in a week. We will plan to titrate her heart failure medications as an outpatient as her blood pressure allows. An echocardiogram in the hospital showed EF of 25%-30%. Distal anterior wall and apex were akinetic. In terms of laboratory studies, her CBC was unremarkable. Chemistries, her creatinine was initially 1.2 and subsequently on day of discharge creatinine was 0.83 showing improvement. Her troponin peak level was 12.9. She did have NT-proBNP of 15,000 consistent with her congestive heart failure.         Mike Putnam MD      [id number]  D:  05/11/2020 15:03  T:  05/11/2020 16:05  JOB #:  [id number]  CC:  MD Nayan Roy MD

## 2020-05-11 NOTE — ROUTINE PROCESS
Attempted to schedule PCP [facility] apt, left voicemail. Practice will contact the patient with apt info

## 2020-05-11 NOTE — PROGRESS NOTES
Problem: Pressure Injury - Risk of  Goal: *Prevention of pressure injury  Description: Document Asmy Scale and appropriate interventions in the flowsheet. Outcome: Progressing Towards Goal  Note: Pressure Injury Interventions:             Activity Interventions: PT/OT evaluation, Increase time out of bed    Mobility Interventions: PT/OT evaluation, HOB 30 degrees or less    Nutrition Interventions: Document food/fluid/supplement intake    Friction and Shear Interventions: Apply protective barrier, creams and emollients, HOB 30 degrees or less, Minimize layers

## 2020-05-11 NOTE — PROGRESS NOTES
0700: Bedside shift change report given to Mariela Hickman RN (oncoming nurse) by Tyesha Garcia RN (offgoing nurse). Report included the following information SBAR, Kardex, Intake/Output and MAR.     0700: Patient in bed, resting quietly. Call bell in reach, will monitor. 0730: Dr. Kary Bajwa at bedside, will discharge patient.      1015: Discharge teaching completed, PIVs removed, ride is downstairs waiting,

## 2020-05-11 NOTE — ACP (ADVANCE CARE PLANNING)
Advance Care Planning     Advance Care Planning Activator (Inpatient)   Note      Date of ACP Chart review: 5/11/2020    Attempted to call pt room-no answer. Will route note to provider and PCP to follow up on [address] [place] [address] [place] of primary and secondary Healthcare decision maker. Unknown if pt has previously filled out AMD.    ACP Activator: Hermilo Cast RN    Routed note to Providers

## 2020-05-11 NOTE — PROGRESS NOTES
TRANSITION OF CARE PLAN:     Plan A: Home with family     CM acknowledge inpatient admission. CM will follow pt for consults and any needs prior to discharge. If any concerns or questions arise pertaining to pt discharge, please contact unit CM. CM contacted pt via room phone. Pt indicated that her  and daughter are here to transport her home now. Medicare pt has received, reviewed, and signed 2nd IM letter informing them of their right to appeal the discharge. Signed copy has been placed on pt bedside chart. Pt has no additional concerns or needs at this time. Medicare pt has received, reviewed, and signed 2nd IM letter informing them of their right to appeal the discharge. Signed copy has been placed on pt bedside chart. Care Management has completed the discharge planning needs of the patient at this time.        Gaurav Collier, Care Manager  [phone]

## 2020-05-12 LAB
ACT BLD: 246 SECS (ref 79–138)
ACT BLD: 301 SECS (ref 79–138)

## 2020-05-14 LAB
BACTERIA SPEC CULT: NORMAL
SERVICE CMNT-IMP: NORMAL

## 2020-11-05 ENCOUNTER — TRANSCRIBE ORDER (OUTPATIENT)
Dept: SCHEDULING | Age: 75
End: 2020-11-05

## 2020-11-05 DIAGNOSIS — Z12.31 VISIT FOR SCREENING MAMMOGRAM: Primary | ICD-10-CM

## 2020-12-31 ENCOUNTER — HOSPITAL ENCOUNTER (OUTPATIENT)
Dept: MAMMOGRAPHY | Age: 75
Discharge: HOME | End: 2020-12-31
Attending: FAMILY MEDICINE
Payer: MEDICARE

## 2020-12-31 DIAGNOSIS — Z12.31 VISIT FOR SCREENING MAMMOGRAM: ICD-10-CM

## 2020-12-31 PROCEDURE — 77067 SCR MAMMO BI INCL CAD: CPT

## 2021-03-27 ENCOUNTER — HOSPITAL ENCOUNTER (INPATIENT)
Age: 76
LOS: 2 days | Discharge: HOME | DRG: 291 | End: 2021-03-30
Attending: EMERGENCY MEDICINE | Admitting: GENERAL ACUTE CARE HOSPITAL
Payer: MEDICARE

## 2021-03-27 ENCOUNTER — APPOINTMENT (OUTPATIENT)
Dept: GENERAL RADIOLOGY | Age: 76
DRG: 291 | End: 2021-03-27
Attending: EMERGENCY MEDICINE
Payer: MEDICARE

## 2021-03-27 DIAGNOSIS — I50.9 ACUTE ON CHRONIC CONGESTIVE HEART FAILURE, UNSPECIFIED HEART FAILURE TYPE (HCC): ICD-10-CM

## 2021-03-27 DIAGNOSIS — E87.5 ACUTE HYPERKALEMIA: ICD-10-CM

## 2021-03-27 DIAGNOSIS — I50.1 PULMONARY EDEMA CARDIAC CAUSE (HCC): Primary | ICD-10-CM

## 2021-03-27 LAB
ALBUMIN SERPL-MCNC: 3.4 G/DL (ref 3.5–5)
ALBUMIN/GLOB SERPL: 1 {RATIO} (ref 1.1–2.2)
ALP SERPL-CCNC: 66 U/L (ref 45–117)
ALT SERPL-CCNC: 37 U/L (ref 12–78)
ANION GAP SERPL CALC-SCNC: 3 MMOL/L (ref 5–15)
AST SERPL-CCNC: 45 U/L (ref 15–37)
BASOPHILS # BLD: 0 K/UL (ref 0–0.1)
BASOPHILS NFR BLD: 1 % (ref 0–1)
BILIRUB SERPL-MCNC: 0.5 MG/DL (ref 0.2–1)
BNP SERPL-MCNC: ABNORMAL PG/ML
BUN SERPL-MCNC: 21 MG/DL (ref 6–20)
BUN/CREAT SERPL: 16 (ref 12–20)
CALCIUM SERPL-MCNC: 8.2 MG/DL (ref 8.5–10.1)
CHLORIDE SERPL-SCNC: 96 MMOL/L (ref 97–108)
CK MB CFR SERPL CALC: 3.5 % (ref 0–2.5)
CK MB SERPL-MCNC: 18.9 NG/ML (ref 5–25)
CK SERPL-CCNC: 538 U/L (ref 26–192)
CO2 SERPL-SCNC: 27 MMOL/L (ref 21–32)
CREAT SERPL-MCNC: 1.3 MG/DL (ref 0.55–1.02)
DIFFERENTIAL METHOD BLD: ABNORMAL
EOSINOPHIL # BLD: 0.2 K/UL (ref 0–0.4)
EOSINOPHIL NFR BLD: 4 % (ref 0–7)
ERYTHROCYTE [DISTWIDTH] IN BLOOD BY AUTOMATED COUNT: 17.4 % (ref 11.5–14.5)
GLOBULIN SER CALC-MCNC: 3.3 G/DL (ref 2–4)
GLUCOSE SERPL-MCNC: 73 MG/DL (ref 65–100)
HCT VFR BLD AUTO: 32.5 % (ref 35–47)
HGB BLD-MCNC: 10.9 G/DL (ref 11.5–16)
IMM GRANULOCYTES # BLD AUTO: 0 K/UL (ref 0–0.04)
IMM GRANULOCYTES NFR BLD AUTO: 0 % (ref 0–0.5)
LYMPHOCYTES # BLD: 1.2 K/UL (ref 0.8–3.5)
LYMPHOCYTES NFR BLD: 29 % (ref 12–49)
MCH RBC QN AUTO: 23 PG (ref 26–34)
MCHC RBC AUTO-ENTMCNC: 33.5 G/DL (ref 30–36.5)
MCV RBC AUTO: 68.6 FL (ref 80–99)
MONOCYTES # BLD: 0.5 K/UL (ref 0–1)
MONOCYTES NFR BLD: 11 % (ref 5–13)
NEUTS SEG # BLD: 2.3 K/UL (ref 1.8–8)
NEUTS SEG NFR BLD: 55 % (ref 32–75)
NRBC # BLD: 0 K/UL (ref 0–0.01)
NRBC BLD-RTO: 0 PER 100 WBC
PLATELET # BLD AUTO: 122 K/UL (ref 150–400)
PMV BLD AUTO: ABNORMAL FL (ref 8.9–12.9)
POTASSIUM SERPL-SCNC: 5.7 MMOL/L (ref 3.5–5.1)
PROT SERPL-MCNC: 6.7 G/DL (ref 6.4–8.2)
RBC # BLD AUTO: 4.74 M/UL (ref 3.8–5.2)
RBC MORPH BLD: ABNORMAL
SODIUM SERPL-SCNC: 126 MMOL/L (ref 136–145)
TROPONIN I SERPL-MCNC: <0.05 NG/ML
WBC # BLD AUTO: 4.2 K/UL (ref 3.6–11)
WBC MORPH BLD: ABNORMAL

## 2021-03-27 PROCEDURE — 74011250636 HC RX REV CODE- 250/636: Performed by: EMERGENCY MEDICINE

## 2021-03-27 PROCEDURE — 96375 TX/PRO/DX INJ NEW DRUG ADDON: CPT

## 2021-03-27 PROCEDURE — 82553 CREATINE MB FRACTION: CPT

## 2021-03-27 PROCEDURE — 96374 THER/PROPH/DIAG INJ IV PUSH: CPT

## 2021-03-27 PROCEDURE — 85025 COMPLETE CBC W/AUTO DIFF WBC: CPT

## 2021-03-27 PROCEDURE — 83880 ASSAY OF NATRIURETIC PEPTIDE: CPT

## 2021-03-27 PROCEDURE — 82550 ASSAY OF CK (CPK): CPT

## 2021-03-27 PROCEDURE — 84484 ASSAY OF TROPONIN QUANT: CPT

## 2021-03-27 PROCEDURE — 71045 X-RAY EXAM CHEST 1 VIEW: CPT

## 2021-03-27 PROCEDURE — 93005 ELECTROCARDIOGRAM TRACING: CPT

## 2021-03-27 PROCEDURE — 94762 N-INVAS EAR/PLS OXIMTRY CONT: CPT

## 2021-03-27 PROCEDURE — 36415 COLL VENOUS BLD VENIPUNCTURE: CPT

## 2021-03-27 PROCEDURE — 99285 EMERGENCY DEPT VISIT HI MDM: CPT

## 2021-03-27 PROCEDURE — 80053 COMPREHEN METABOLIC PANEL: CPT

## 2021-03-27 RX ORDER — ASPIRIN 81 MG/1
TABLET ORAL DAILY
COMMUNITY

## 2021-03-27 RX ORDER — FUROSEMIDE 10 MG/ML
60 INJECTION INTRAMUSCULAR; INTRAVENOUS
Status: COMPLETED | OUTPATIENT
Start: 2021-03-27 | End: 2021-03-27

## 2021-03-27 RX ORDER — SACUBITRIL AND VALSARTAN 24; 26 MG/1; MG/1
1 TABLET, FILM COATED ORAL 2 TIMES DAILY
COMMUNITY
End: 2021-03-30

## 2021-03-27 RX ORDER — SODIUM POLYSTYRENE SULFONATE 15 G/60ML
45 SUSPENSION ORAL; RECTAL
Status: COMPLETED | OUTPATIENT
Start: 2021-03-27 | End: 2021-03-28

## 2021-03-27 RX ORDER — SULFAMETHOXAZOLE AND TRIMETHOPRIM 800; 160 MG/1; MG/1
1 TABLET ORAL 2 TIMES DAILY
COMMUNITY
End: 2021-03-30

## 2021-03-27 RX ADMIN — METHYLPREDNISOLONE SODIUM SUCCINATE 125 MG: 125 INJECTION, POWDER, FOR SOLUTION INTRAMUSCULAR; INTRAVENOUS at 19:38

## 2021-03-27 RX ADMIN — FUROSEMIDE 60 MG: 10 INJECTION, SOLUTION INTRAMUSCULAR; INTRAVENOUS at 20:21

## 2021-03-28 ENCOUNTER — APPOINTMENT (OUTPATIENT)
Dept: NON INVASIVE DIAGNOSTICS | Age: 76
DRG: 291 | End: 2021-03-28
Attending: GENERAL ACUTE CARE HOSPITAL
Payer: MEDICARE

## 2021-03-28 ENCOUNTER — APPOINTMENT (OUTPATIENT)
Dept: CT IMAGING | Age: 76
DRG: 291 | End: 2021-03-28
Attending: GENERAL ACUTE CARE HOSPITAL
Payer: MEDICARE

## 2021-03-28 PROBLEM — R63.0 ANOREXIA: Status: ACTIVE | Noted: 2021-03-28

## 2021-03-28 PROBLEM — I50.9 CHF EXACERBATION (HCC): Status: ACTIVE | Noted: 2021-03-28

## 2021-03-28 PROBLEM — R10.9 ABDOMINAL PAIN: Status: ACTIVE | Noted: 2021-03-28

## 2021-03-28 LAB
ANION GAP SERPL CALC-SCNC: 7 MMOL/L (ref 5–15)
ATRIAL RATE: 61 BPM
BUN SERPL-MCNC: 24 MG/DL (ref 6–20)
BUN/CREAT SERPL: 17 (ref 12–20)
CALCIUM SERPL-MCNC: 8.6 MG/DL (ref 8.5–10.1)
CALCULATED P AXIS, ECG09: 47 DEGREES
CALCULATED R AXIS, ECG10: 82 DEGREES
CALCULATED T AXIS, ECG11: -3 DEGREES
CHLORIDE SERPL-SCNC: 97 MMOL/L (ref 97–108)
CO2 SERPL-SCNC: 25 MMOL/L (ref 21–32)
COMMENT, HOLDF: NORMAL
COVID-19 RAPID TEST, COVR: NOT DETECTED
CREAT SERPL-MCNC: 1.39 MG/DL (ref 0.55–1.02)
DIAGNOSIS, 93000: NORMAL
ECHO AO ROOT DIAM: 2.75 CM
ECHO AV AREA PEAK VELOCITY: 1.11 CM2
ECHO AV AREA PEAK VELOCITY: 1.14 CM2
ECHO AV AREA PEAK VELOCITY: 1.15 CM2
ECHO AV AREA PEAK VELOCITY: 1.18 CM2
ECHO AV AREA VTI: 1.14 CM2
ECHO AV AREA/BSA VTI: 0.8 CM2/M2
ECHO AV MEAN GRADIENT: 3.67 MMHG
ECHO AV PEAK GRADIENT: 6.95 MMHG
ECHO AV PEAK GRADIENT: 7.45 MMHG
ECHO AV PEAK VELOCITY: 131.85 CM/S
ECHO AV PEAK VELOCITY: 136.43 CM/S
ECHO AV VTI: 24.67 CM
ECHO EST RA PRESSURE: 10 MMHG
ECHO LA AREA 4C: 16.24 CM2
ECHO LA MAJOR AXIS: 3.58 CM
ECHO LA MINOR AXIS: 2.59 CM
ECHO LA VOL 4C: 39.61 ML (ref 22–52)
ECHO LA VOLUME INDEX A4C: 28.63 ML/M2 (ref 16–28)
ECHO LV E' LATERAL VELOCITY: 7.39 CM/S
ECHO LV E' SEPTAL VELOCITY: 4.87 CM/S
ECHO LV INTERNAL DIMENSION DIASTOLIC: 5.23 CM (ref 3.9–5.3)
ECHO LV INTERNAL DIMENSION SYSTOLIC: 4.23 CM
ECHO LV IVSD: 0.67 CM (ref 0.6–0.9)
ECHO LV MASS 2D: 183.2 G (ref 67–162)
ECHO LV MASS INDEX 2D: 132.4 G/M2 (ref 43–95)
ECHO LV POSTERIOR WALL DIASTOLIC: 1.23 CM (ref 0.6–0.9)
ECHO LVOT CARDIAC OUTPUT: 1.63 LITER/MINUTE
ECHO LVOT DIAM: 1.46 CM
ECHO LVOT PEAK GRADIENT: 3.28 MMHG
ECHO LVOT PEAK GRADIENT: 3.43 MMHG
ECHO LVOT PEAK VELOCITY: 90.61 CM/S
ECHO LVOT PEAK VELOCITY: 92.64 CM/S
ECHO LVOT SV: 28.1 ML
ECHO LVOT VTI: 16.75 CM
ECHO MV A VELOCITY: 81.52 CM/S
ECHO MV AREA PHT: 2.91 CM2
ECHO MV AREA VTI: 0.92 CM2
ECHO MV E DECELERATION TIME (DT): 140.95 MS
ECHO MV E VELOCITY: 104.33 CM/S
ECHO MV E/A RATIO: 1.28
ECHO MV E/E' LATERAL: 14.12
ECHO MV E/E' RATIO (AVERAGED): 17.77
ECHO MV E/E' SEPTAL: 21.42
ECHO MV MAX VELOCITY: 110.85 CM/S
ECHO MV MEAN GRADIENT: 2.05 MMHG
ECHO MV PEAK GRADIENT: 4.92 MMHG
ECHO MV PRESSURE HALF TIME (PHT): 75.49 MS
ECHO MV VTI: 30.65 CM
ECHO PV MAX VELOCITY: 83.29 CM/S
ECHO PV MAX VELOCITY: 89.74 CM/S
ECHO PV MEAN GRADIENT: 1.43 MMHG
ECHO PV PEAK INSTANTANEOUS GRADIENT SYSTOLIC: 2.77 MMHG
ECHO PV PEAK INSTANTANEOUS GRADIENT SYSTOLIC: 3.22 MMHG
ECHO PV REGURGITANT MAX VELOCITY: 172.19 CM/S
ECHO PV VTI: 17.04 CM
ECHO RA AREA 4C: 20.36 CM2
ECHO RIGHT VENTRICULAR SYSTOLIC PRESSURE (RVSP): 26.82 MMHG
ECHO TV REGURGITANT MAX VELOCITY: 204.26 CM/S
ECHO TV REGURGITANT MAX VELOCITY: 247.71 CM/S
ECHO TV REGURGITANT PEAK GRADIENT: 16.82 MMHG
GLUCOSE SERPL-MCNC: 130 MG/DL (ref 65–100)
LACTATE SERPL-SCNC: 1.5 MMOL/L (ref 0.4–2)
LVOT MG: 1.55 MMHG
P-R INTERVAL, ECG05: 198 MS
POTASSIUM SERPL-SCNC: 4.1 MMOL/L (ref 3.5–5.1)
PROCALCITONIN SERPL-MCNC: <0.05 NG/ML
Q-T INTERVAL, ECG07: 398 MS
QRS DURATION, ECG06: 88 MS
QTC CALCULATION (BEZET), ECG08: 400 MS
SAMPLES BEING HELD,HOLD: NORMAL
SARS-COV-2, COV2: NORMAL
SODIUM SERPL-SCNC: 129 MMOL/L (ref 136–145)
SOURCE, COVRS: NORMAL
TROPONIN I SERPL-MCNC: <0.05 NG/ML
VENTRICULAR RATE, ECG03: 61 BPM

## 2021-03-28 PROCEDURE — 71250 CT THORAX DX C-: CPT

## 2021-03-28 PROCEDURE — 96375 TX/PRO/DX INJ NEW DRUG ADDON: CPT

## 2021-03-28 PROCEDURE — 93306 TTE W/DOPPLER COMPLETE: CPT

## 2021-03-28 PROCEDURE — 74011250637 HC RX REV CODE- 250/637: Performed by: GENERAL ACUTE CARE HOSPITAL

## 2021-03-28 PROCEDURE — 83605 ASSAY OF LACTIC ACID: CPT

## 2021-03-28 PROCEDURE — 74011250637 HC RX REV CODE- 250/637: Performed by: INTERNAL MEDICINE

## 2021-03-28 PROCEDURE — 74176 CT ABD & PELVIS W/O CONTRAST: CPT

## 2021-03-28 PROCEDURE — 84484 ASSAY OF TROPONIN QUANT: CPT

## 2021-03-28 PROCEDURE — 36415 COLL VENOUS BLD VENIPUNCTURE: CPT

## 2021-03-28 PROCEDURE — 74011000258 HC RX REV CODE- 258: Performed by: INTERNAL MEDICINE

## 2021-03-28 PROCEDURE — 74011250636 HC RX REV CODE- 250/636: Performed by: GENERAL ACUTE CARE HOSPITAL

## 2021-03-28 PROCEDURE — 77030038269 HC DRN EXT URIN PURWCK BARD -A

## 2021-03-28 PROCEDURE — 87635 SARS-COV-2 COVID-19 AMP PRB: CPT

## 2021-03-28 PROCEDURE — 74011000636 HC RX REV CODE- 636: Performed by: GENERAL ACUTE CARE HOSPITAL

## 2021-03-28 PROCEDURE — 84145 PROCALCITONIN (PCT): CPT

## 2021-03-28 PROCEDURE — 65660000000 HC RM CCU STEPDOWN

## 2021-03-28 PROCEDURE — 80048 BASIC METABOLIC PNL TOTAL CA: CPT

## 2021-03-28 PROCEDURE — 74011250636 HC RX REV CODE- 250/636: Performed by: INTERNAL MEDICINE

## 2021-03-28 RX ORDER — PROMETHAZINE HYDROCHLORIDE 25 MG/1
12.5 TABLET ORAL
Status: DISCONTINUED | OUTPATIENT
Start: 2021-03-28 | End: 2021-03-30 | Stop reason: HOSPADM

## 2021-03-28 RX ORDER — GUAIFENESIN 600 MG/1
600 TABLET, EXTENDED RELEASE ORAL EVERY 12 HOURS
Status: DISCONTINUED | OUTPATIENT
Start: 2021-03-28 | End: 2021-03-28

## 2021-03-28 RX ORDER — POLYETHYLENE GLYCOL 3350 17 G/17G
17 POWDER, FOR SOLUTION ORAL DAILY PRN
Status: DISCONTINUED | OUTPATIENT
Start: 2021-03-28 | End: 2021-03-30 | Stop reason: HOSPADM

## 2021-03-28 RX ORDER — SODIUM CHLORIDE 0.9 % (FLUSH) 0.9 %
5-40 SYRINGE (ML) INJECTION AS NEEDED
Status: DISCONTINUED | OUTPATIENT
Start: 2021-03-28 | End: 2021-03-30 | Stop reason: HOSPADM

## 2021-03-28 RX ORDER — HYDROCHLOROTHIAZIDE 25 MG/1
25 TABLET ORAL DAILY
COMMUNITY
End: 2021-03-30

## 2021-03-28 RX ORDER — ACETAMINOPHEN 325 MG/1
650 TABLET ORAL
Status: DISCONTINUED | OUTPATIENT
Start: 2021-03-28 | End: 2021-03-30 | Stop reason: HOSPADM

## 2021-03-28 RX ORDER — GABAPENTIN 300 MG/1
600 CAPSULE ORAL 3 TIMES DAILY
Status: DISCONTINUED | OUTPATIENT
Start: 2021-03-28 | End: 2021-03-28

## 2021-03-28 RX ORDER — FUROSEMIDE 20 MG/1
20 TABLET ORAL DAILY
Status: ON HOLD | COMMUNITY
End: 2021-03-30 | Stop reason: SDUPTHER

## 2021-03-28 RX ORDER — CARVEDILOL 12.5 MG/1
12.5 TABLET ORAL 2 TIMES DAILY WITH MEALS
Status: ON HOLD | COMMUNITY
End: 2021-05-22 | Stop reason: SDUPTHER

## 2021-03-28 RX ORDER — GABAPENTIN 300 MG/1
300 CAPSULE ORAL
Status: DISCONTINUED | OUTPATIENT
Start: 2021-03-28 | End: 2021-03-30 | Stop reason: HOSPADM

## 2021-03-28 RX ORDER — GABAPENTIN 300 MG/1
300 CAPSULE ORAL AS NEEDED
COMMUNITY

## 2021-03-28 RX ORDER — IPRATROPIUM BROMIDE AND ALBUTEROL SULFATE 2.5; .5 MG/3ML; MG/3ML
3 SOLUTION RESPIRATORY (INHALATION)
Status: DISCONTINUED | OUTPATIENT
Start: 2021-03-28 | End: 2021-03-28

## 2021-03-28 RX ORDER — HEPARIN SODIUM 5000 [USP'U]/ML
5000 INJECTION, SOLUTION INTRAVENOUS; SUBCUTANEOUS EVERY 8 HOURS
Status: DISCONTINUED | OUTPATIENT
Start: 2021-03-28 | End: 2021-03-30 | Stop reason: HOSPADM

## 2021-03-28 RX ORDER — FUROSEMIDE 10 MG/ML
40 INJECTION INTRAMUSCULAR; INTRAVENOUS DAILY
Status: DISCONTINUED | OUTPATIENT
Start: 2021-03-28 | End: 2021-03-30 | Stop reason: HOSPADM

## 2021-03-28 RX ORDER — SODIUM CHLORIDE 0.9 % (FLUSH) 0.9 %
5-40 SYRINGE (ML) INJECTION EVERY 8 HOURS
Status: DISCONTINUED | OUTPATIENT
Start: 2021-03-28 | End: 2021-03-30 | Stop reason: HOSPADM

## 2021-03-28 RX ORDER — ATORVASTATIN CALCIUM 40 MG/1
40 TABLET, FILM COATED ORAL DAILY
Status: DISCONTINUED | OUTPATIENT
Start: 2021-03-28 | End: 2021-03-30 | Stop reason: HOSPADM

## 2021-03-28 RX ORDER — CLOPIDOGREL BISULFATE 75 MG/1
75 TABLET ORAL DAILY
Status: DISCONTINUED | OUTPATIENT
Start: 2021-03-28 | End: 2021-03-30 | Stop reason: HOSPADM

## 2021-03-28 RX ORDER — IPRATROPIUM BROMIDE AND ALBUTEROL SULFATE 2.5; .5 MG/3ML; MG/3ML
3 SOLUTION RESPIRATORY (INHALATION)
Status: DISCONTINUED | OUTPATIENT
Start: 2021-03-28 | End: 2021-03-30 | Stop reason: HOSPADM

## 2021-03-28 RX ORDER — CARVEDILOL 12.5 MG/1
12.5 TABLET ORAL 2 TIMES DAILY WITH MEALS
Status: DISCONTINUED | OUTPATIENT
Start: 2021-03-28 | End: 2021-03-30 | Stop reason: HOSPADM

## 2021-03-28 RX ORDER — ASPIRIN 81 MG/1
81 TABLET ORAL DAILY
Status: DISCONTINUED | OUTPATIENT
Start: 2021-03-28 | End: 2021-03-30 | Stop reason: HOSPADM

## 2021-03-28 RX ORDER — ACETAMINOPHEN 650 MG/1
650 SUPPOSITORY RECTAL
Status: DISCONTINUED | OUTPATIENT
Start: 2021-03-28 | End: 2021-03-30 | Stop reason: HOSPADM

## 2021-03-28 RX ORDER — ONDANSETRON 2 MG/ML
4 INJECTION INTRAMUSCULAR; INTRAVENOUS
Status: DISCONTINUED | OUTPATIENT
Start: 2021-03-28 | End: 2021-03-30 | Stop reason: HOSPADM

## 2021-03-28 RX ADMIN — HEPARIN SODIUM 5000 UNITS: 5000 INJECTION INTRAVENOUS; SUBCUTANEOUS at 22:06

## 2021-03-28 RX ADMIN — HEPARIN SODIUM 5000 UNITS: 5000 INJECTION INTRAVENOUS; SUBCUTANEOUS at 05:46

## 2021-03-28 RX ADMIN — Medication 10 ML: at 17:48

## 2021-03-28 RX ADMIN — FUROSEMIDE 40 MG: 10 INJECTION, SOLUTION INTRAMUSCULAR; INTRAVENOUS at 08:16

## 2021-03-28 RX ADMIN — SODIUM POLYSTYRENE SULFONATE 45 G: 15 SUSPENSION ORAL; RECTAL at 00:09

## 2021-03-28 RX ADMIN — METHYLPREDNISOLONE SODIUM SUCCINATE 40 MG: 40 INJECTION, POWDER, FOR SOLUTION INTRAMUSCULAR; INTRAVENOUS at 08:16

## 2021-03-28 RX ADMIN — GUAIFENESIN 600 MG: 600 TABLET, EXTENDED RELEASE ORAL at 02:35

## 2021-03-28 RX ADMIN — Medication 10 ML: at 22:06

## 2021-03-28 RX ADMIN — Medication 10 ML: at 05:47

## 2021-03-28 RX ADMIN — GABAPENTIN 600 MG: 300 CAPSULE ORAL at 08:16

## 2021-03-28 RX ADMIN — CALCIUM GLUCONATE 1 G: 94 INJECTION, SOLUTION INTRAVENOUS at 00:07

## 2021-03-28 RX ADMIN — GUAIFENESIN 600 MG: 600 TABLET, EXTENDED RELEASE ORAL at 08:16

## 2021-03-28 RX ADMIN — ASPIRIN 81 MG: 81 TABLET, COATED ORAL at 08:16

## 2021-03-28 RX ADMIN — HEPARIN SODIUM 5000 UNITS: 5000 INJECTION INTRAVENOUS; SUBCUTANEOUS at 17:46

## 2021-03-28 RX ADMIN — CARVEDILOL 12.5 MG: 12.5 TABLET, FILM COATED ORAL at 08:16

## 2021-03-28 RX ADMIN — IOHEXOL 50 ML: 240 INJECTION, SOLUTION INTRATHECAL; INTRAVASCULAR; INTRAVENOUS; ORAL at 02:35

## 2021-03-28 RX ADMIN — CARVEDILOL 12.5 MG: 12.5 TABLET, FILM COATED ORAL at 17:47

## 2021-03-28 RX ADMIN — CLOPIDOGREL BISULFATE 75 MG: 75 TABLET ORAL at 08:16

## 2021-03-28 NOTE — PROGRESS NOTES
End of Shift Note    Bedside shift change report given to Beatriz Boyd (oncoming nurse) by Amada Toney (offgoing nurse).   Report included the following information SBAR, Kardex, Procedure Summary, Intake/Output, MAR and Recent Results    Shift worked:  7-7pm   Shift summary and any significant changes:     No significant changes     Concerns for physician to address[facility]  [facility]     Zone phone for oncoming shift:   [address]

## 2021-03-28 NOTE — ED NOTES
Technician accidentally emptied vaccutainer with urine before collection for labs could be obtained.  850 ml in contained

## 2021-03-28 NOTE — PROGRESS NOTES
Pharmacy Medication Reconciliation     The patient was NOT interviewed regarding current PTA medication list, use and drug allergies. Ms Christine Cervantes was questioned regarding use of any other inhalers, topical products, over the counter medications, herbal medications, vitamin products or ophthalmic/nasal/otic medication use. Allergy Update: Percocet [oxycodone-acetaminophen]    Recommendations/Findings: The following amendments were made to the patient's active medication list on file at [facility]:   1) Additions: Furosemide 20 mg (she only took one pill)    2) Deletions: Hydromorphone, Lisinopril     3) Changes: Carvedilol increased to 12.5 mg, Gabapentin changed to PRN      Pertinent Findings: Per Ms. Stanley, the patient takes HCTZ and recently she was prescribed furosemide 20 mg, but the patient took only 1 pill  She only has 3 tablets of the Bactrim left (out of 20 pills)    -Clarified PTA med list with Ms. Stanley at [phone]. PTA medication list was corrected to the following:     Prior to Admission Medications   Prescriptions Last Dose Informant Taking?   aspirin delayed-release 81 mg tablet 3/27/2021  Yes   Sig: Take  by mouth daily. atorvastatin (Lipitor) 40 mg tablet 3/27/2021 at Unknown time  Yes   Sig: Take 40 mg by mouth daily. carvediloL (Coreg) 12.5 mg tablet 3/27/2021  Yes   Sig: Take 12.5 mg by mouth two (2) times daily (with meals). clopidogreL (PLAVIX) 75 mg tab 3/27/2021 at Unknown time  Yes   Sig: Take 1 Tab by mouth daily. furosemide (LASIX) 20 mg tablet   Yes   Sig: Take 20 mg by mouth daily. gabapentin (NEURONTIN) 300 mg capsule 3/27/2021  Yes   Sig: Take 300 mg by mouth as needed for Pain.   hydroCHLOROthiazide (HYDRODIURIL) 25 mg tablet 3/27/2021  Yes   Sig: Take 25 mg by mouth daily. potassium chloride (K-DUR) 20 mEq tablet 3/27/2021 at Unknown time  Yes   Sig: Take 1 Tab by mouth daily.    sacubitriL-valsartan Lutricia [place]) 24-26 mg tablet 3/27/2021  Yes   Sig: Take 1 Tab by mouth two (2) times a day. trimethoprim-sulfamethoxazole (BACTRIM DS, SEPTRA DS) 160-800 mg per tablet 3/27/2021  Yes   Sig: Take 1 Tab by mouth two (2) times a day.       Facility-Administered Medications: None        Thank you,  Merrill Bernard, PHARMD

## 2021-03-28 NOTE — PROGRESS NOTES
Hospitalist Progress Note    NAME: Darshan Will   :  1945   MRN:  [id number]       Assessment / Plan:  Acute on chronic systolic heart failure with EF of 25%  COPD exacerbation  rapid Covid negative   procalcitonin < 0.05; troponin negative   CT chest: Fluid overload. Mild pulmonary edema. Decreased small right and trace left  pleural effusions. Mild cardiomegaly and small pericardial effusion, both new. Cont to diureses with IV Lasix  Patient given Solu-Medrol 125 mg in ER and started on Solu-Medrol 40 twice daily and Duoneb q6h ---> clinically HF wo copd exacerbation, so will stop steroids   Echo: EF 25%   Primary cardiology consulted   - med rec done: HCTZ and recently she was prescribed furosemide 20 mg, but the patient took only 1 pill     Multivessel coronary artery disease, status post PCI/stenting of proximal LAD, proximal circumflex artery and proximal RCA, on 2020  PAD bilateral lower extremity amputation  Continue aspirin/Plavix   Hold statin given elevated CPK  Continue beta-blocker     Abdominal pain  Vomiting episodes  Weight loss  - no vomiting   CT A/P: wo acute findings      BONNIE with hyperkalemia  Hyponatremia,  acute on chronic   Likely dilutional hyponatremia and elevated cr due to poor perfusion status and use of Bactrim    Hold K-Dur, entresto , bactrim    monitor closely. Avoid nephrotoxic drugs, adjust all meds to GFR. Elevated CPK level  Hold statin and repeat CPK in a.m. Follow-up COVID-19 results     Hypochromic microcytic anemia  Check iron profile and ferritin  Keep hemoglobin more than 8           Code status: Full  Prophylaxis: Lovenox     Baseline: lives at home with family/ ; WC, can transfer independent   Recommended Disposition: Home w/Family     Subjective:     Chief Complaint / Reason for Physician Visit: FU HF   Laying almost flat , echo in progress   Feeling better     Discussed with RN events overnight.      Review of Systems:  Symptom Y/N Comments  Symptom Y/N Comments   Fever/Chills n   Chest Pain n    Poor Appetite    Edema     Cough    Abdominal Pain     Sputum    Joint Pain     SOB/BRINK y   Pruritis/Rash     Nausea/vomit    Tolerating PT/OT     Diarrhea    Tolerating Diet     Constipation    Other       Could NOT obtain due to:      Objective:     VITALS:   Last 24hrs VS reviewed since prior progress note. Most recent are:  Patient Vitals for the past 24 hrs:   Temp Pulse Resp BP SpO2   03/28/21 1511 97.4 °F (36.3 °C) (!) 56 14 (!) 118/59 94 %   03/28/21 1145    (!) 125/58    03/28/21 1139 98 °F (36.7 °C) 73 16 (!) 125/58 98 %   03/28/21 0733 98.3 °F (36.8 °C) 89 16 130/70 96 %   03/28/21 0416 98.7 °F (37.1 °C) 92 18 129/76 94 %   03/28/21 0413 98.7 °F (37.1 °C) 92 19 129/76 94 %   03/28/21 0325  93 23 110/65    03/28/21 0316 98.1 °F (36.7 °C)       03/28/21 0100  63 18 (!) 128/94 97 %   03/28/21 0015  78 18 139/82 97 %   03/27/21 2245  69 19 137/62 98 %   03/27/21 1837 97.6 °F (36.4 °C) 65 16 (!) 140/75 100 %     No intake or output data in the 24 hours ending 03/28/21 1518     I had a face to face encounter and independently examined this patient on 3/28/2021, as outlined below:  PHYSICAL EXAM:  General: WD, WN. Alert, cooperative, no acute distress    EENT:  EOMI. Anicteric sclerae. MMM  Resp:  CTA bilaterally, no wheezing or rales. No accessory muscle use  CV:  Regular  rhythm,  + edema left thigh   GI:  Soft, Non distended, Non tender. +Bowel sounds  Neurologic:  Alert and oriented X 3, normal speech,   Psych:   Good insight. Not anxious nor agitated  Skin:  No rashes.   No jaundice  Extr:                MYRIAM, LBK amputations     Reviewed most current lab test results and cultures  YES  Reviewed most current radiology test results   YES  Review and summation of old records today    NO  Reviewed patient's current orders and MAR    YES  PMH/SH reviewed - no change compared to H&P  ________________________________________________________________________  Care Plan discussed with:    Comments   Patient y    Family      RN y    Care Manager     Consultant                        Multidiciplinary team rounds were held today with , nursing, pharmacist and clinical coordinator. Patient's plan of care was discussed; medications were reviewed and discharge planning was addressed. ________________________________________________________________________  Total NON critical care TIME: 35  Minutes    Total CRITICAL CARE TIME Spent:   Minutes non procedure based      Comments   >50% of visit spent in counseling and coordination of care y    ________________________________________________________________________  Arslan Amin MD     Procedures: see electronic medical records for all procedures/Xrays and details which were not copied into this note but were reviewed prior to creation of Plan. LABS:  I reviewed today's most current labs and imaging studies.   Pertinent labs include:  Recent Labs     03/27/21 1919   WBC 4.2   HGB 10.9*   HCT 32.5*   *     Recent Labs     03/28/21  1152 03/27/21 1919   * 126*   K 4.1 5.7*   CL 97 96*   CO2 25 27   * 73   BUN 24* 21*   CREA 1.39* 1.30*   CA 8.6 8.2*   ALB  --  3.4*   TBILI  --  0.5   ALT  --  37       Signed: Arslan Amin MD

## 2021-03-28 NOTE — ED PROVIDER NOTES
EMERGENCY DEPARTMENT HISTORY AND PHYSICAL EXAM      Date: 3/27/2021  Patient Name: Asif Murphy  Patient Age and Sex: 68 y.o. female    History of Presenting Illness     Chief Complaint   Patient presents with    Vomiting     sx x 2 weeks. pt has been to pcp without resolution of sx.  no fever. pt has vomited twice today    Wheezing       History Provided By: Patient    Ability to gather history was limited by:     HPI: Asif Murphy, 68 y.o. female with history of hypertension, peripheral vascular disease status post bilateral lower extremity amputations, CHF, complains of shortness of breath and chest congestion symptoms and wheezing for the past few weeks. No chest pain or fevers. Mild white sputum. Her shortness of breath symptoms are moderate severity. Has also had some intermittent vomiting symptoms. Sounds as though she may have not been taking her furosemide recently. Location:    Quality:      Severity:    Duration:   Timing:      Context:    Modifying factors:   Associated symptoms:       The patient's medical, surgical, family, and social history on file were reviewed by me today.       Past Medical History:   Diagnosis Date    Arthritis     was in legs    Hypertension     Other ill-defined conditions(799.89)     extremety vascular disease    [facility] [address]     PVD (peripheral vascular disease) ([facility] [address]      Past Surgical History:   Procedure Laterality Date    COLONOSCOPY N/A 11/7/2018    COLONOSCOPY performed [facility]    HX AMPUTATION  2007    Right Above Knee Amputation    HX AMPUTATION  6/2011    Left Below Knee Amputation    HX ORTHOPAEDIC  7/17/12     Debridement Left BKA Stump and Placement of Wound [facility]    HX OTHER SURGICAL  10/24/12    SPLIT THICKNESS SKIN GRAFT FROM RIGHT THIGH TO BELOW KNEE AMPUTATION STUMP (to wound left BKA stump    [place] VEIN BYPASS GRAFT,FEM-TIBIAL  3/04/2011    Left femoral post. tibial bypass with vein       PCP: Patricia Demarco MD    Past History     Past Medical History:  Past Medical History:   Diagnosis Date    Arthritis     was in legs    Hypertension     Other ill-defined conditions(799.89)     extremety vascular disease    [facility]per[facility]) [facility] [address]     PVD (peripheral vascular disease) ([facility] [address]        Past Surgical History:  Past Surgical History:   Procedure Laterality Date    COLONOSCOPY N/A 2018    COLONOSCOPY performed [facility] at [facility] END[facility]COPY    HX AMPUTATION      Right Above Knee Amputation    HX AMPUTATION  2011    Left Below Knee Amputation    HX ORTHOPAEDIC  12     Debridement Left BKA Stump and Placement of Wound [facility]    HX OTHER SURGICAL  10/24/12    SPLIT THICKNESS SKIN GRAFT FROM RIGHT THIGH TO BELOW KNEE AMPUTATION STUMP (to wound left BKA stump    [place] VEIN BYPASS GRAFT,FEM-TIBIAL  3/04/2011    Left femoral post. tibial bypass with vein       Family History:  Family History   Problem Relation Age of Onset    Hypertension Mother     Hypertension Father     Diabetes Sister     Diabetes Brother        Social History:  Social History     Tobacco Use    Smoking status: Former Smoker     Quit date: 2008     Years since quittin.7    Smokeless tobacco: Never Used   Substance Use Topics    Alcohol use: No    Drug use: No       Allergies: Allergies   Allergen Reactions    Percocet [Oxycodone-Acetaminophen] Nausea and Vomiting       Current Medications:  No current facility-administered medications on file prior to encounter. Current Outpatient Medications on File Prior to Encounter   Medication Sig Dispense Refill    sacubitriL-valsartan (Entresto) 24-26 mg tablet Take 1 Tab by mouth two (2) times a day.  aspirin delayed-release 81 mg tablet Take  by mouth daily.  trimethoprim-sulfamethoxazole (BACTRIM DS, SEPTRA DS) 160-800 mg per tablet Take 1 Tab by mouth two (2) times a day.       carvediloL (COREG) 3.125 mg tablet Take 1 Tab by mouth two (2) times daily (with meals). (Patient taking differently: Take 12.5 mg by mouth two (2) times daily (with meals). ) 60 Tab 12    clopidogreL (PLAVIX) 75 mg tab Take 1 Tab by mouth daily. 30 Tab 12    atorvastatin (Lipitor) 40 mg tablet Take 40 mg by mouth daily.  hydrochlorothiazide (HYDRODIURIL) 25 mg tablet Take 1 Tab by mouth daily. 30 Tab 0    potassium chloride (K-DUR) 20 mEq tablet Take 1 Tab by mouth daily. 60 Tab 0    lisinopriL (PRINIVIL, ZESTRIL) 5 mg tablet Take 2 Tabs by mouth daily. 30 Tab 12    HYDROmorphone (DILAUDID) 4 mg tablet Take 1 Tab by mouth every four (4) hours as needed for Pain. 40 Tab 0    GABAPENTIN (NEURONTIN PO) Take 600 mg by mouth three (3) times daily. Review of Systems   Review of Systems   Constitutional: Positive for fatigue. Negative for fever. Respiratory: Positive for cough, chest tightness, shortness of breath and wheezing. Cardiovascular: Positive for leg swelling ( Swelling of her left BKA stump). Negative for chest pain. All other systems reviewed and are negative. Physical Exam   Vital Signs  Patient Vitals for the past 8 hrs:   Temp Pulse Resp BP SpO2   03/27/21 1837 97.6 °F (36.4 °C) 65 16 (!) 140/75 100 %          Physical Exam  Vitals signs and nursing note reviewed. Constitutional:       General: She is not in acute distress. Appearance: Normal appearance. She is well-developed. She is ill-appearing. HENT:      Head: Normocephalic and atraumatic. Mouth/Throat:      Mouth: Mucous membranes are moist.   Eyes:      General:         Right eye: No discharge. Left eye: No discharge. Conjunctiva/sclera: Conjunctivae normal.   Neck:      Musculoskeletal: Normal range of motion and neck supple. Vascular: JVD present. Cardiovascular:      Rate and Rhythm: Normal rate and regular rhythm. Heart sounds: Normal heart sounds. No murmur.    Pulmonary:      Effort: Pulmonary effort is normal. No respiratory distress. Breath sounds: Wheezing and rales present. Abdominal:      General: There is no distension. Palpations: Abdomen is soft. Tenderness: There is no abdominal tenderness. Comments: Abdominal wall edema   Musculoskeletal: Normal range of motion. General: No deformity. Left lower leg: Edema present. Comments: Bilateral lower extremity edema, left stump edema   Skin:     General: Skin is warm and dry. Findings: No rash. Neurological:      General: No focal deficit present. Mental Status: She is alert and oriented to person, place, and time.    Psychiatric:         Speech: Speech normal.         Behavior: Behavior normal.         Cognition and Memory: Cognition normal.         Diagnostic Study Results   Labs  Recent Results (from the past 24 hour(s))   EKG, 12 LEAD, INITIAL    Collection Time: 03/27/21  6:51 PM   Result Value Ref Range    Ventricular Rate 61 BPM    Atrial Rate 61 BPM    P-R Interval 198 ms    QRS Duration 88 ms    Q-T Interval 398 ms    QTC Calculation (Bezet) 400 ms    Calculated P Axis 47 degrees    Calculated R Axis 82 degrees    Calculated T Axis -3 degrees    Diagnosis       Normal sinus rhythm  Anterolateral infarct (cited on or before 08-MAY-2020)  When compared with ECG of 09-MAY-2020 05:15,  Left anterior fascicular block is no longer present  Questionable change in initial forces of Lateral leads  Nonspecific T wave abnormality now evident in Inferior leads  T wave inversion no longer evident in Anterolateral leads  QT has shortened     CBC WITH AUTOMATED DIFF    Collection Time: 03/27/21  7:19 PM   Result Value Ref Range    WBC 4.2 3.6 - 11.0 K/uL    RBC 4.74 3.80 - 5.20 M/uL    HGB 10.9 (L) 11.5 - 16.0 g/dL    HCT 32.5 (L) 35.0 - 47.0 %    MCV 68.6 (L) 80.0 - 99.0 FL    MCH 23.0 (L) 26.0 - 34.0 PG    MCHC 33.5 30.0 - 36.5 g/dL    RDW 17.4 (H) 11.5 - 14.5 %    PLATELET 480 (L) 408 - 400 K/uL    MPV ABNORMAL 8.9 - 12.9 FL    NRBC 0.0 0  WBC    ABSOLUTE NRBC 0.00 0.00 - 0.01 K/uL    NEUTROPHILS 55 32 - 75 %    LYMPHOCYTES 29 12 - 49 %    MONOCYTES 11 5 - 13 %    EOSINOPHILS 4 0 - 7 %    BASOPHILS 1 0 - 1 %    IMMATURE GRANULOCYTES 0 0.0 - 0.5 %    ABS. NEUTROPHILS 2.3 1.8 - 8.0 K/UL    ABS. LYMPHOCYTES 1.2 0.8 - 3.5 K/UL    ABS. MONOCYTES 0.5 0.0 - 1.0 K/UL    ABS. EOSINOPHILS 0.2 0.0 - 0.4 K/UL    ABS. BASOPHILS 0.0 0.0 - 0.1 K/UL    ABS. IMM. GRANS. 0.0 0.00 - 0.04 K/UL    DF MANUAL      RBC COMMENTS MICROCYTOSIS  HYPOCHROMIA  ANISOCYTOSIS        WBC COMMENTS REACTIVE LYMPHS     METABOLIC PANEL, COMPREHENSIVE    Collection Time: 03/27/21  7:19 PM   Result Value Ref Range    Sodium 126 (L) 136 - 145 mmol/L    Potassium 5.7 (H) 3.5 - 5.1 mmol/L    Chloride 96 (L) 97 - 108 mmol/L    CO2 27 21 - 32 mmol/L    Anion gap 3 (L) 5 - 15 mmol/L    Glucose 73 65 - 100 mg/dL    BUN 21 (H) 6 - 20 MG/DL    Creatinine 1.30 (H) 0.55 - 1.02 MG/DL    BUN/Creatinine ratio 16 12 - 20      GFR est AA 48 (L) >60 ml/min/1.73m2    GFR est non-AA 40 (L) >60 ml/min/1.73m2    Calcium 8.2 (L) 8.5 - 10.1 MG/DL    Bilirubin, total 0.5 0.2 - 1.0 MG/DL    ALT (SGPT) 37 12 - 78 U/L    AST (SGOT) 45 (H) 15 - 37 U/L    Alk. phosphatase 66 45 - 117 U/L    Protein, total 6.7 6.4 - 8.2 g/dL    Albumin 3.4 (L) 3.5 - 5.0 g/dL    Globulin 3.3 2.0 - 4.0 g/dL    A-G Ratio 1.0 (L) 1.1 - 2.2     CK W/ REFLX CKMB    Collection Time: 03/27/21  7:19 PM   Result Value Ref Range     (H) 26 - 192 U/L   TROPONIN I    Collection Time: 03/27/21  7:19 PM   Result Value Ref Range    Troponin-I, Qt. <0.05 <0.05 ng/mL   NT-PRO BNP    Collection Time: 03/27/21  7:19 PM   Result Value Ref Range    NT pro-BNP 20,492 (H) <450 PG/ML   CK-MB,QUANT.     Collection Time: 03/27/21  7:19 PM   Result Value Ref Range    CK - MB 18.9 (H) <3.6 NG/ML    CK-MB Index 3.5 (H) 0.0 - 2.5         Radiologic Studies  XR CHEST PORT   Final Result      Mild pulmonary edema versus interstitial bilateral pneumonia. Bilateral small   pleural effusions. Consider PA and lateral chest views when the patient can   better tolerate. CT Results  (Last 48 hours)    None        CXR Results  (Last 48 hours)               03/27/21 1916  XR CHEST PORT Final result    Impression:      Mild pulmonary edema versus interstitial bilateral pneumonia. Bilateral small   pleural effusions. Consider PA and lateral chest views when the patient can   better tolerate. Narrative:  EXAM: XR CHEST PORT       INDICATION: Shortness of breath, wheezing, and vomiting. COMPARISON: Portable chest on 5/9/2020 and 5/8/2020. CT chest on 5/8/2020. TECHNIQUE: Upright portable chest AP view       FINDINGS: Borderline cardiac size is accentuated by technique. Aortic arch is   atherosclerotic but not enlarged. Trachea is patent. Pulmonary vascular   prominence is mild increased. Bilateral reticular interstitial opacities are mild and increased. Small   bilateral pleural effusions. No pneumothorax. Bones are osteopenic. Billable Procedures   EKG    Date/Time: 3/27/2021 8:11 PM  Performed by: Winfred Mortimer, MD  Authorized by: Winfred Mortimer, MD     ECG reviewed by ED Physician in the absence of a cardiologist: yes    Interpretation:     Interpretation: non-specific    Rate:     ECG rate assessment: normal    Rhythm:     Rhythm: sinus rhythm    Ectopy:     Ectopy: none    QRS:     QRS axis:  Normal  ST segments:     ST segments:  Normal  T waves:     T waves: normal          Cardiac monitoring was ordered by me for this patient. // The indication was to monitor for dysrhythmias secondary to, or as a cause of, the patient's complaint of SOB, CHF  // The rhythm revealed by the monitor, interpreted by me, was NSR    Medical Decision Making     I reviewed the patient's most recent Emergency Dept notes and diagnostic tests  in formulating my MDM on today's visit.     Provider Notes (Medical Decision Making):   68-year-old female with CHF, peripheral vascular disease, complaining of shortness of breath with wheezing and chest congestion and cough for the past few weeks. No fevers. No chest pain. On examination she is ill-appearing but with normal vitals and normal oxygenation. She has edema of the abdominal wall and her left stump. JVD. Bilateral wheezing and mild rales. BNP is 20,000. H&P is most consistent with congestive heart failure and pulmonary edema. She was hospitalized for similar albeit more severe symptoms 1 year ago. Intravenous diuresis with Lasix. Solu-Medrol for wheezing. Admit to medicine. Suzi Rivera MD  8:04 PM    Consults:    Social History     Tobacco Use    Smoking status: Former Smoker     Quit date: 2008     Years since quittin.7    Smokeless tobacco: Never Used   Substance Use Topics    Alcohol use: No    Drug use: No     No data found. Prescriptions from today's ED visit:  Current Discharge Medication List           Medications Administered during ED course:  Medications   sodium polystyrene (KAYEXALATE) 15 gram/60 mL oral suspension 45 g (has no administration in time range)   calcium gluconate 1 g in 0.9% sodium chloride 100 mL IVPB (has no administration in time range)   methylPREDNISolone (PF) (Solu-MEDROL) injection 125 mg (125 mg IntraVENous Given 3/27/21 1938)   furosemide (LASIX) injection 60 mg (60 mg IntraVENous Given 3/27/21 2021)          Diagnosis and Disposition     Disposition:  Admitted    Clinical Impression:   1. Pulmonary edema cardiac cause ([address]    2. Acute on chronic congestive heart failure, unspecified heart failure type ([address]    3. Acute hyperkalemia        Attestation:  I personally performed the services described in this documentation on this date 3/27/2021 for patient Chuyita Palacios. Suzi Rivera MD        I was the first provider for this patient on this visit.   To the best of my ability I reviewed relevant prior medical records, electrocardiograms, laboratories, and radiologic studies. The patient's presenting problems were discussed, and the patient was in agreement with the care plan formulated and outlined with them. Damon Ludwig MD    Please note that this dictation was completed with Dragon voice recognition software. Quite often unanticipated grammatical, syntax, homophones, and other interpretive errors are inadvertently transcribed by the computer software. Please disregard these errors and excuse any errors that have escaped final proofreading.

## 2021-03-28 NOTE — ED NOTES
TRANSFER - OUT REPORT:    Verbal report given to Kylah(ena) on Jason Prater  being transferred to Gen Surg(unit) for routine progression of care       Report consisted of patients Situation, Background, Assessment and   Recommendations(SBAR). Information from the following report(s) SBAR, ED Summary, [facility] and Recent Results was reviewed with the receiving nurse. Lines:   Peripheral IV 89/42/60 Right Basilic (Active)   Site Assessment Clean, dry, & intact 03/27/21 2005   Phlebitis Assessment 0 03/27/21 2005   Infiltration Assessment 0 03/27/21 2005   Dressing Status Clean, dry, & intact 03/27/21 2005   Dressing Type Tape;Transparent 03/27/21 2005   Hub Color/Line Status Pink;Flushed;Patent 03/27/21 2005   Action Taken Blood drawn 03/27/21 2005   Alcohol Cap Used No 03/27/21 2005        Opportunity for questions and clarification was provided.

## 2021-03-28 NOTE — PROGRESS NOTES
End of Shift Note    Bedside shift change report given to Adrianna (oncoming nurse) by Jazz Camarena (offgoing nurse). Report included the following information SBAR, Kardex, ED Summary, Intake/Output, MAR and Recent Results    Shift worked:  1900-0730     Shift summary and any significant changes:     Pt arrived to floor around 0415, no complaints or immediate concerns. Pt very tired on arrival and did not wish to answer admission database questions at that time. Concerns for physician to address:  none     Zone phone for oncoming shift:          Activity:  Activity Level: Bed Rest  Number times ambulated in hallways past shift: 0  Number of times OOB to chair past shift: 0    Cardiac:   Cardiac Monitoring: Yes           Access:   Current line(s): PIV     Genitourinary:   Urinary status: voiding and external catheter    Respiratory:   O2 Device: Room air  Chronic home O2 use?: NO  Incentive spirometer at bedside: NO     GI:  Last Bowel Movement Date: 03/28/21  Current diet:  DIET CARDIAC Regular  Passing flatus: YES  Tolerating current diet: YES       Pain Management:   Patient states pain is manageable on current regimen: N/A    Skin:  Samy Score: 16  Interventions: internal/external urinary devices    Patient Safety:  Fall Score:  Total Score: 3  Interventions: pt to call before getting OOB  High Fall Risk: Yes    Length of Stay:  Expected LOS: - - -  Actual LOS: 0      Jazz Camarena

## 2021-03-28 NOTE — H&P
Hospitalist Admission Note    NAME: Asif Murphy   :  1945   MRN:  [id number]     Date/Time:  3/27/2021 11:45 PM    Patient PCP: Paola Brooks MD  ______________________________________________________________________  Given the patient's current clinical presentation, I have a high level of concern for decompensation if discharged from the emergency department. Complex decision making was performed, which includes reviewing the patient's available past medical records, laboratory results, and x-ray films. My assessment of this patient's clinical condition and my plan of care is as follows. Assessment / Plan:  SOB  Acute on chronic systolic heart failure with EF of 25%  COPD exacerbation  Rule out Covid pneumonia  Follow-up procalcitonin  We will get CT chest for further evaluation  Start IV Lasix  Patient given Solu-Medrol 125 mg in ER, will start Solu-Medrol 40 twice daily and start Duoneb q6h, Pulm outpt f.u for PFT  ECHO  Pharmacy to verify home medications    Multivessel coronary artery disease, status post PCI/stenting of proximal LAD, proximal circumflex artery and proximal RCA, on 2020  PAD bilateral lower extremity amputation  Continue aspirin/Plavix   Hold statin given elevated CPK  Continue beta-blocker  Trend Trop  ECHO    Abdominal pain  Vomiting episodes  Weight loss  We order CT abdomen pelvis with p.o. contrast given BONNIE, please follow-up results  If CT images do not explain abdominal pain, will repeat with IV contrast once renal function improves    BONNIE with hyperkalemia  Hyponatremia  Likely due to diuretics/KCl supplement  Hold K-Dur  Given hyperkalemia cocktail and Kayexalate  Close monitoring while on Lasix  Check urine osmolality and sodium level    Elevated CPK level  Hold statin and repeat CPK in a.m.   Follow-up COVID-19 results    Hypochromic microcytic anemia  Check iron profile and ferritin  Keep hemoglobin more than 8    Code Status: full   DVT Prophylaxis: heparin   GI Prophylaxis: not indicated  Baseline: b/l LE amputation      Subjective:   CHIEF COMPLAINT: Worsening shortness of breath, abdominal pain and vomiting. HISTORY OF PRESENT ILLNESS:     Aníbal Merritt is a 68 y.o.  female who presents with the above CC. Patient has been complaining of anorexia that has been going on for few weeks now, had episodes of nonbloody vomiting, and on and off abdominal pain. No reported diarrhea. She has been also complaining of shortness of breath that has been getting worse gradually, noted to have somewhat cough, complains of lower extremity edema. Patient denies fever, chills, flulike symptoms, sick contact, she had her first shot of Covid vaccine few weeks ago. Work-up significant for hemoglobin of 10.9, MCV 68, platelet 289, sodium 126, potassium 5.7, chloride 96, BUN 21, creatinine 1.3, AST 45, , CK-MB 18, negative troponin and NT proBNP 20 K. Xr Chest Port  Mild pulmonary edema versus interstitial bilateral pneumonia. Bilateral small pleural effusions. Consider PA and lateral chest views when the patient can better tolerate. EKG with sinus rhythm, 60 bpm, poor R wave progression. We were asked to admit for work up and evaluation of the above problems.      Past Medical History:   Diagnosis Date    Arthritis     was in legs    Hypertension     Other ill-defined conditions(799.89)     extremety vascular disease    [facility] [address] [facility]     PVD (peripheral vascular disease) ([facility] [address] [facility]         Past Surgical History:   Procedure Laterality Date    COLONOSCOPY N/A 11/7/2018    COLONOSCOPY performed by Zaynab Garcia MD at [facility] ENDOSCOPY    HX AMPUTATION  2007    Right Above Knee Amputation    HX AMPUTATION  6/2011    Left Below Knee Amputation    HX ORTHOPAEDIC  7/17/12     Debridement Left BKA Stump and Placement of Wound [facility]    HX OTHER SURGICAL  10/24/12    SPLIT THICKNESS SKIN GRAFT FROM RIGHT THIGH TO BELOW KNEE AMPUTATION STUMP (to wound left BKA stump    OR VEIN BYPASS GRAFT,FEM-TIBIAL  3/04/2011    Left femoral post. tibial bypass with vein       Social History     Tobacco Use    Smoking status: Former Smoker     Quit date: 2008     Years since quittin.7    Smokeless tobacco: Never Used   Substance Use Topics    Alcohol use: No        Family History   Problem Relation Age of Onset    Hypertension Mother     Hypertension Father     Diabetes Sister     Diabetes Brother      Allergies   Allergen Reactions    Percocet [Oxycodone-Acetaminophen] Nausea and Vomiting        Prior to Admission medications    Medication Sig Start Date End Date Taking? Authorizing Provider   sacubitriL-valsartan Lohumalen Lias) 24-26 mg tablet Take 1 Tab by mouth two (2) times a day. Yes Other, MD Stephani   aspirin delayed-release 81 mg tablet Take  by mouth daily. Yes Other, MD Stephani   trimethoprim-sulfamethoxazole (BACTRIM DS, SEPTRA DS) 160-800 mg per tablet Take 1 Tab by mouth two (2) times a day. Yes Other, MD Stephani   carvediloL (COREG) 3.125 mg tablet Take 1 Tab by mouth two (2) times daily (with meals). Patient taking differently: Take 12.5 mg by mouth two (2) times daily (with meals). 20  Yes Jakub Jett MD   clopidogreL (PLAVIX) 75 mg tab Take 1 Tab by mouth daily. 20  Yes Jakub Jett MD   atorvastatin (Lipitor) 40 mg tablet Take 40 mg by mouth daily. Yes Bel, MD Stephani   hydrochlorothiazide (HYDRODIURIL) 25 mg tablet Take 1 Tab by mouth daily. 3/14/11  Yes Greg Wolf MD   potassium chloride (K-DUR) 20 mEq tablet Take 1 Tab by mouth daily. 3/14/11  Yes Greg Wolf MD   lisinopriL (PRINIVIL, ZESTRIL) 5 mg tablet Take 2 Tabs by mouth daily. 20   Jakub Jett MD   HYDROmorphone (DILAUDID) 4 mg tablet Take 1 Tab by mouth every four (4) hours as needed for Pain.  10/24/12   Trudy Chavira MD   GABAPENTIN (NEURONTIN PO) Take 600 mg by mouth three (3) times daily. Provider, Historical       REVIEW OF SYSTEMS:     Total of 12 systems reviewed, negative except for the above:      Objective:   VITALS:    Visit Vitals  BP (!) 140/75   Pulse 65   Temp 97.6 °F (36.4 °C)   Resp 16   Ht 4' 2\" (1.27 m)   Wt 61.2 kg (135 lb)   SpO2 100%   BMI 37.97 kg/m²     No intake or output data in the 24 hours ending 03/27/21 2345   Wt Readings from Last 10 Encounters:   03/27/21 61.2 kg (135 lb)   05/08/20 61.2 kg (134 lb 14.7 oz)   11/07/18 61.2 kg (135 lb)   11/05/12 59 kg (130 lb)   10/24/12 59 kg (130 lb)   10/22/12 61.8 kg (136 lb 5 oz)   08/18/12 63 kg (139 lb)   07/17/12 62.6 kg (138 lb)   05/31/12 60.3 kg (133 lb)   05/23/12 60.3 kg (133 lb)       PHYSICAL EXAM:  General:    Alert, cooperative, no distress, appears stated age. HEENT: Atraumatic, anicteric sclerae, pink conjunctivae, MMM  Neck:  Supple, symmetrical  Lungs:   Limited air entry with rhonchi. No rales. No tenderness. No Accessory muscle use. Heart:   Regular rhythm. No murmur. No JVD   Abdomen:   Soft. ND.  BS normal.  Left lower quadrant abdominal tenderness. Extremities: No edema. No cyanosis. Bilateral lower extremity potation. Skin:     Not pale. Not Jaundiced. No rashes   Psych:  Good insight. Not depressed. Not anxious or agitated. Neurologic: Alert and oriented X 4. EOMs intact. No facial asymmetry. No slurred speech.  Symmetrical strength, Sensation grossly intact.     _______________________________________________________________________  Care Plan discussed with:    Comments   Patient x    Family      RN     Care Manager                    Consultant:      _______________________________________________________________________  Expected  Disposition:   Home with Family x   HH/PT/OT/RN    SNF/LTC    [place]    ________________________________________________________________________  TOTAL TIME:  48 Minutes        Comments    x Reviewed previous records   >50% of visit spent in counseling and coordination of care x Discussion with patient and/or family and questions answered       ________________________________________________________________________  Signed: Jorge Burton MD    Procedures: see electronic medical records for all procedures/Xrays and details which were not copied into this note but were reviewed prior to creation of Plan. LAB DATA REVIEWED:    Recent Results (from the past 24 hour(s))   EKG, 12 LEAD, INITIAL    Collection Time: 03/27/21  6:51 PM   Result Value Ref Range    Ventricular Rate 61 BPM    Atrial Rate 61 BPM    P-R Interval 198 ms    QRS Duration 88 ms    Q-T Interval 398 ms    QTC Calculation (Bezet) 400 ms    Calculated P Axis 47 degrees    Calculated R Axis 82 degrees    Calculated T Axis -3 degrees    Diagnosis       Normal sinus rhythm  Anterolateral infarct (cited on or before 08-MAY-2020)  When compared with ECG of 09-MAY-2020 05:15,  Left anterior fascicular block is no longer present  Questionable change in initial forces of Lateral leads  Nonspecific T wave abnormality now evident in Inferior leads  T wave inversion no longer evident in Anterolateral leads  QT has shortened     CBC WITH AUTOMATED DIFF    Collection Time: 03/27/21  7:19 PM   Result Value Ref Range    WBC 4.2 3.6 - 11.0 K/uL    RBC 4.74 3.80 - 5.20 M/uL    HGB 10.9 (L) 11.5 - 16.0 g/dL    HCT 32.5 (L) 35.0 - 47.0 %    MCV 68.6 (L) 80.0 - 99.0 FL    MCH 23.0 (L) 26.0 - 34.0 PG    MCHC 33.5 30.0 - 36.5 g/dL    RDW 17.4 (H) 11.5 - 14.5 %    PLATELET 641 (L) 073 - 400 K/uL    MPV ABNORMAL 8.9 - 12.9 FL    NRBC 0.0 0  WBC    ABSOLUTE NRBC 0.00 0.00 - 0.01 K/uL    NEUTROPHILS 55 32 - 75 %    LYMPHOCYTES 29 12 - 49 %    MONOCYTES 11 5 - 13 %    EOSINOPHILS 4 0 - 7 %    BASOPHILS 1 0 - 1 %    IMMATURE GRANULOCYTES 0 0.0 - 0.5 %    ABS. NEUTROPHILS 2.3 1.8 - 8.0 K/UL    ABS. LYMPHOCYTES 1.2 0.8 - 3.5 K/UL    ABS. MONOCYTES 0.5 0.0 - 1.0 K/UL    ABS. EOSINOPHILS 0.2 0.0 - 0.4 K/UL    ABS. BASOPHILS 0.0 0.0 - 0.1 K/UL    ABS. IMM. GRANS. 0.0 0.00 - 0.04 K/UL    DF MANUAL      RBC COMMENTS MICROCYTOSIS  HYPOCHROMIA  ANISOCYTOSIS        WBC COMMENTS REACTIVE LYMPHS     METABOLIC PANEL, COMPREHENSIVE    Collection Time: 03/27/21  7:19 PM   Result Value Ref Range    Sodium 126 (L) 136 - 145 mmol/L    Potassium 5.7 (H) 3.5 - 5.1 mmol/L    Chloride 96 (L) 97 - 108 mmol/L    CO2 27 21 - 32 mmol/L    Anion gap 3 (L) 5 - 15 mmol/L    Glucose 73 65 - 100 mg/dL    BUN 21 (H) 6 - 20 MG/DL    Creatinine 1.30 (H) 0.55 - 1.02 MG/DL    BUN/Creatinine ratio 16 12 - 20      GFR est AA 48 (L) >60 ml/min/1.73m2    GFR est non-AA 40 (L) >60 ml/min/1.73m2    Calcium 8.2 (L) 8.5 - 10.1 MG/DL    Bilirubin, total 0.5 0.2 - 1.0 MG/DL    ALT (SGPT) 37 12 - 78 U/L    AST (SGOT) 45 (H) 15 - 37 U/L    Alk. phosphatase 66 45 - 117 U/L    Protein, total 6.7 6.4 - 8.2 g/dL    Albumin 3.4 (L) 3.5 - 5.0 g/dL    Globulin 3.3 2.0 - 4.0 g/dL    A-G Ratio 1.0 (L) 1.1 - 2.2     CK W/ REFLX CKMB    Collection Time: 03/27/21  7:19 PM   Result Value Ref Range     (H) 26 - 192 U/L   TROPONIN I    Collection Time: 03/27/21  7:19 PM   Result Value Ref Range    Troponin-I, Qt. <0.05 <0.05 ng/mL   NT-PRO BNP    Collection Time: 03/27/21  7:19 PM   Result Value Ref Range    NT pro-BNP 20,492 (H) <450 PG/ML   CK-MB,QUANT. Collection Time: 03/27/21  7:19 PM   Result Value Ref Range    CK - MB 18.9 (H) <3.6 NG/ML    CK-MB Index 3.5 (H) 0.0 - 2.5       Xr Chest Port    Result Date: 3/27/2021  Mild pulmonary edema versus interstitial bilateral pneumonia. Bilateral small pleural effusions. Consider PA and lateral chest views when the patient can better tolerate.

## 2021-03-29 LAB
ALBUMIN SERPL-MCNC: 3.2 G/DL (ref 3.5–5)
ALBUMIN/GLOB SERPL: 1 {RATIO} (ref 1.1–2.2)
ALP SERPL-CCNC: 60 U/L (ref 45–117)
ALT SERPL-CCNC: 33 U/L (ref 12–78)
AMPHET UR QL SCN: NEGATIVE
ANION GAP SERPL CALC-SCNC: 6 MMOL/L (ref 5–15)
AST SERPL-CCNC: 32 U/L (ref 15–37)
BARBITURATES UR QL SCN: NEGATIVE
BASOPHILS # BLD: 0 K/UL (ref 0–0.1)
BASOPHILS NFR BLD: 0 % (ref 0–1)
BENZODIAZ UR QL: NEGATIVE
BILIRUB SERPL-MCNC: 0.3 MG/DL (ref 0.2–1)
BUN SERPL-MCNC: 30 MG/DL (ref 6–20)
BUN/CREAT SERPL: 18 (ref 12–20)
CALCIUM SERPL-MCNC: 8.1 MG/DL (ref 8.5–10.1)
CANNABINOIDS UR QL SCN: NEGATIVE
CHLORIDE SERPL-SCNC: 97 MMOL/L (ref 97–108)
CK SERPL-CCNC: 284 U/L (ref 26–192)
CO2 SERPL-SCNC: 26 MMOL/L (ref 21–32)
COCAINE UR QL SCN: NEGATIVE
CREAT SERPL-MCNC: 1.64 MG/DL (ref 0.55–1.02)
CREAT UR-MCNC: 81.9 MG/DL
DIFFERENTIAL METHOD BLD: ABNORMAL
DRUG SCRN COMMENT,DRGCM: NORMAL
EOSINOPHIL # BLD: 0 K/UL (ref 0–0.4)
EOSINOPHIL NFR BLD: 0 % (ref 0–7)
ERYTHROCYTE [DISTWIDTH] IN BLOOD BY AUTOMATED COUNT: 17.1 % (ref 11.5–14.5)
FERRITIN SERPL-MCNC: 144 NG/ML (ref 26–388)
GLOBULIN SER CALC-MCNC: 3.2 G/DL (ref 2–4)
GLUCOSE SERPL-MCNC: 112 MG/DL (ref 65–100)
HCT VFR BLD AUTO: 30.2 % (ref 35–47)
HGB BLD-MCNC: 10.4 G/DL (ref 11.5–16)
IMM GRANULOCYTES # BLD AUTO: 0 K/UL (ref 0–0.04)
IMM GRANULOCYTES NFR BLD AUTO: 0 % (ref 0–0.5)
IRON SATN MFR SERPL: 16 % (ref 20–50)
IRON SERPL-MCNC: 31 UG/DL (ref 35–150)
LYMPHOCYTES # BLD: 1.1 K/UL (ref 0.8–3.5)
LYMPHOCYTES NFR BLD: 27 % (ref 12–49)
MAGNESIUM SERPL-MCNC: 1.7 MG/DL (ref 1.6–2.4)
MCH RBC QN AUTO: 23.2 PG (ref 26–34)
MCHC RBC AUTO-ENTMCNC: 34.4 G/DL (ref 30–36.5)
MCV RBC AUTO: 67.3 FL (ref 80–99)
METHADONE UR QL: NEGATIVE
MONOCYTES # BLD: 0.8 K/UL (ref 0–1)
MONOCYTES NFR BLD: 20 % (ref 5–13)
NEUTS SEG # BLD: 2.3 K/UL (ref 1.8–8)
NEUTS SEG NFR BLD: 53 % (ref 32–75)
NRBC # BLD: 0 K/UL (ref 0–0.01)
NRBC BLD-RTO: 0 PER 100 WBC
OPIATES UR QL: NEGATIVE
OSMOLALITY UR: 358 MOSM/KG H2O
PCP UR QL: NEGATIVE
PLATELET # BLD AUTO: 144 K/UL (ref 150–400)
PMV BLD AUTO: 11.1 FL (ref 8.9–12.9)
POTASSIUM SERPL-SCNC: 4 MMOL/L (ref 3.5–5.1)
PROT SERPL-MCNC: 6.4 G/DL (ref 6.4–8.2)
RBC # BLD AUTO: 4.49 M/UL (ref 3.8–5.2)
RBC MORPH BLD: ABNORMAL
SODIUM SERPL-SCNC: 129 MMOL/L (ref 136–145)
SODIUM UR-SCNC: 51 MMOL/L
TIBC SERPL-MCNC: 194 UG/DL (ref 250–450)
WBC # BLD AUTO: 4.2 K/UL (ref 3.6–11)

## 2021-03-29 PROCEDURE — 85025 COMPLETE CBC W/AUTO DIFF WBC: CPT

## 2021-03-29 PROCEDURE — 77030038269 HC DRN EXT URIN PURWCK BARD -A

## 2021-03-29 PROCEDURE — 82728 ASSAY OF FERRITIN: CPT

## 2021-03-29 PROCEDURE — 74011250637 HC RX REV CODE- 250/637: Performed by: GENERAL ACUTE CARE HOSPITAL

## 2021-03-29 PROCEDURE — 84300 ASSAY OF URINE SODIUM: CPT

## 2021-03-29 PROCEDURE — 82550 ASSAY OF CK (CPK): CPT

## 2021-03-29 PROCEDURE — 83735 ASSAY OF MAGNESIUM: CPT

## 2021-03-29 PROCEDURE — 83540 ASSAY OF IRON: CPT

## 2021-03-29 PROCEDURE — 80307 DRUG TEST PRSMV CHEM ANLYZR: CPT

## 2021-03-29 PROCEDURE — 80053 COMPREHEN METABOLIC PANEL: CPT

## 2021-03-29 PROCEDURE — 82570 ASSAY OF URINE CREATININE: CPT

## 2021-03-29 PROCEDURE — 65660000000 HC RM CCU STEPDOWN

## 2021-03-29 PROCEDURE — 83935 ASSAY OF URINE OSMOLALITY: CPT

## 2021-03-29 PROCEDURE — 36415 COLL VENOUS BLD VENIPUNCTURE: CPT

## 2021-03-29 PROCEDURE — 74011250636 HC RX REV CODE- 250/636: Performed by: GENERAL ACUTE CARE HOSPITAL

## 2021-03-29 RX ORDER — LANOLIN ALCOHOL/MO/W.PET/CERES
1 CREAM (GRAM) TOPICAL
Status: DISCONTINUED | OUTPATIENT
Start: 2021-03-30 | End: 2021-03-30 | Stop reason: HOSPADM

## 2021-03-29 RX ADMIN — Medication 10 ML: at 05:54

## 2021-03-29 RX ADMIN — CARVEDILOL 12.5 MG: 12.5 TABLET, FILM COATED ORAL at 18:18

## 2021-03-29 RX ADMIN — CARVEDILOL 12.5 MG: 12.5 TABLET, FILM COATED ORAL at 09:34

## 2021-03-29 RX ADMIN — HEPARIN SODIUM 5000 UNITS: 5000 INJECTION INTRAVENOUS; SUBCUTANEOUS at 23:52

## 2021-03-29 RX ADMIN — ASPIRIN 81 MG: 81 TABLET, COATED ORAL at 09:34

## 2021-03-29 RX ADMIN — FUROSEMIDE 40 MG: 10 INJECTION, SOLUTION INTRAMUSCULAR; INTRAVENOUS at 09:34

## 2021-03-29 RX ADMIN — HEPARIN SODIUM 5000 UNITS: 5000 INJECTION INTRAVENOUS; SUBCUTANEOUS at 14:54

## 2021-03-29 RX ADMIN — CLOPIDOGREL BISULFATE 75 MG: 75 TABLET ORAL at 09:34

## 2021-03-29 RX ADMIN — HEPARIN SODIUM 5000 UNITS: 5000 INJECTION INTRAVENOUS; SUBCUTANEOUS at 05:54

## 2021-03-29 RX ADMIN — Medication 5 ML: at 23:53

## 2021-03-29 RX ADMIN — Medication 10 ML: at 14:54

## 2021-03-29 NOTE — PROGRESS NOTES
Bedside shift change report given to [address] [place] [address] (oncoming nurse) by Darci Flaherty, RN (offgoing nurse).   Report included the following information SBAR, Intake/Output, MAR and Accordion    Shift worked:  7a - 7p     Shift summary and any significant changes:     none     Concerns for physician to address:  none     Zone phone for oncoming shift:   [facility] [place], RN

## 2021-03-29 NOTE — PROGRESS NOTES
End of Shift Note    Bedside shift change report given to Lauri (oncoming nurse) by Bonita Ovalles (offgoing nurse). Report included the following information SBAR, Kardex, Intake/Output, MAR and Recent Results    Shift worked:  [id number]     Shift summary and any significant changes:     No significant changes, pt rested well overnight with no complaints. Concerns for physician to address:  none     Zone phone for oncoming shift:          Activity:  Activity Level: Bed Rest  Number times ambulated in hallways past shift: 0  Number of times OOB to chair past shift: 0    Cardiac:   Cardiac Monitoring: Yes      Cardiac Rhythm: Sinus arrhythmia    Access:   Current line(s): PIV     Genitourinary:   Urinary status: voiding and external catheter    Respiratory:   O2 Device: Room air  Chronic home O2 use?: NO  Incentive spirometer at bedside: NO     GI:  Last Bowel Movement Date: 03/28/21  Current diet:  DIET CARDIAC Regular  Passing flatus: YES  Tolerating current diet: YES  % Diet Eaten: 100 %    Pain Management:   Patient states pain is manageable on current regimen: YES    Skin:  Samy Score: 16  Interventions: increase time out of bed    Patient Safety:  Fall Score:  Total Score: 3  Interventions: pt to call before getting OOB  High Fall Risk: Yes    Length of Stay:  Expected LOS: - - -  Actual LOS: 1      Bonita Ovalles

## 2021-03-29 NOTE — PROGRESS NOTES
Hospitalist Progress Note    NAME: Eliz Gomez   :  1945   MRN:  [id number]       Assessment / Plan:  Acute on chronic systolic heart failure with EF of 25%  COPD exacerbation  Rapid Covid negative, low suspicious clinically    procalcitonin < 0.05; troponin negative   CT chest: Fluid overload. Mild pulmonary edema. Decreased small right and trace left  pleural effusions. Mild cardiomegaly and small pericardial effusion, both new. Cont to diureses with IV Lasix  Patient given Solu-Medrol 125 mg in ER and started on Solu-Medrol 40 twice daily and Duoneb q6h ---> clinically HF wo copd exacerbation, so stopped steroids   Echo: EF 25%   Primary cardiology consulted   - med rec done: HCTZ and recently she was prescribed furosemide 20 mg, but the patient took only 1 pill  On Bactrim by pcp for possible RLE infection when was found with leg edema; no signs of infection, so stopped     Multivessel coronary artery disease, status post PCI/stenting of proximal LAD, proximal circumflex artery and proximal RCA, on 2020  PAD bilateral lower extremity amputation  Continue aspirin/Plavix   Hold statin given elevated CPK  Continue beta-blocker     Abdominal pain  Vomiting episodes  Weight loss  - resolved   CT A/P: wo acute findings      BONNIE with hyperkalemia  Hyponatremia,  acute on chronic   Likely dilutional hyponatremia and elevated cr due to poor perfusion status and use of Bactrim    Hold K-Dur, entresto , bactrim   Cr is up, will ask renal to help with management    monitor closely. Avoid nephrotoxic drugs, adjust all meds to GFR.      Elevated CPK level resolved   Start back statin      Hypochromic microcytic anemia  Hb stable, iron 31 --> add iron po            Code status: Full  Prophylaxis: Lovenox     Baseline: lives at home with family/ ; WC, can transfer independent   Recommended Disposition: Home w/Family when ok with renal/ cardiology      Subjective:     Chief Complaint / Reason for Physician Visit: FU HF   Clinically improved  LE edema: much much better     Discussed with RN events overnight. Review of Systems:  Symptom Y/N Comments  Symptom Y/N Comments   Fever/Chills n   Chest Pain n    Poor Appetite    Edema     Cough    Abdominal Pain     Sputum    Joint Pain     SOB/BRINK y   Pruritis/Rash     Nausea/vomit    Tolerating PT/OT     Diarrhea    Tolerating Diet     Constipation    Other       Could NOT obtain due to:      Objective:     VITALS:   Last 24hrs VS reviewed since prior progress note. Most recent are:  Patient Vitals for the past 24 hrs:   Temp Pulse Resp BP SpO2   03/29/21 1633 97.5 °F (36.4 °C) 63 18 (!) 106/55 97 %   03/29/21 1219 97.7 °F (36.5 °C) 76 18 (!) 99/57 96 %   03/29/21 0836 97.9 °F (36.6 °C) 66 18 (!) 142/70 97 %   03/29/21 0309 97.7 °F (36.5 °C) 60 18 (!) 149/64 97 %   03/29/21 0044 98 °F (36.7 °C) 65 17 127/60 95 %   03/28/21 2000 98.6 °F (37 °C) 64 16 (!) 109/58 97 %       Intake/Output Summary (Last 24 hours) at 3/29/2021 1813  Last data filed at 3/29/2021 1230  Gross per 24 hour   Intake    Output 400 ml   Net -400 ml        I had a face to face encounter and independently examined this patient on 3/29/2021, as outlined below:  PHYSICAL EXAM:  General: WD, WN. Alert, cooperative, no acute distress    EENT:  EOMI. Anicteric sclerae. MMM  Resp:  CTA bilaterally, no wheezing or rales. No accessory muscle use  CV:  Regular  rhythm,  + edema left thigh --- better   GI:  Soft, Non distended, Non tender. +Bowel sounds  Neurologic:  Alert and oriented X 3, normal speech,   Psych:   Good insight. Not anxious nor agitated  Skin:  No rashes.   No jaundice  Extr:                MYRIAM, LBK amputations     Reviewed most current lab test results and cultures  YES  Reviewed most current radiology test results   YES  Review and summation of old records today    NO  Reviewed patient's current orders and MAR    YES  PMH/SH reviewed - no change compared to H&P  ________________________________________________________________________  Care Plan discussed with:    Comments   Patient y    Family      RN y    Care Manager y    Consultant  y Renal and cardiology                     y Multidiciplinary team rounds were held today with , nursing, pharmacist and clinical coordinator. Patient's plan of care was discussed; medications were reviewed and discharge planning was addressed. ________________________________________________________________________  Total NON critical care TIME: 35  Minutes    Total CRITICAL CARE TIME Spent:   Minutes non procedure based      Comments   >50% of visit spent in counseling and coordination of care y    ________________________________________________________________________  Denisse Albert MD     Procedures: see electronic medical records for all procedures/Xrays and details which were not copied into this note but were reviewed prior to creation of Plan. LABS:  I reviewed today's most current labs and imaging studies.   Pertinent labs include:  Recent Labs     03/29/21  0232 03/27/21 1919   WBC 4.2 4.2   HGB 10.4* 10.9*   HCT 30.2* 32.5*   * 122*     Recent Labs     03/29/21  0232 03/28/21  1152 03/27/21 1919   * 129* 126*   K 4.0 4.1 5.7*   CL 97 97 96*   CO2 26 25 27   * 130* 73   BUN 30* 24* 21*   CREA 1.64* 1.39* 1.30*   CA 8.1* 8.6 8.2*   MG 1.7  --   --    ALB 3.2*  --  3.4*   TBILI 0.3  --  0.5   ALT 33  --  37       Signed: Denisse Albert MD

## 2021-03-29 NOTE — CONSULTS
Consult/Admission    NAME: Sandrine Montemayor   :  1945   MRN:  [id number]     Date/Time:  3/29/2021 8:44 AM    Patient PCP: Amilcar Taylor MD  ________________________________________________________________________     Assessment:    Acute on chronic systolic heart failure. LV EF ~ 30 % . Hx of Anterior MI  . Anterior wall is akinetic . PAD   S/p bilateral  Leg amputaions. Hyperlipidemia   Acute on chronic kidney disease. Echo reviewed :   Mild LV dilatation . Severe LV dysfunction . The Anterior wall and apex are akinetic owing to her acute Anterior MI last May . The inferior and lateral walls contract reasonably , though not normal.   EF looks to be about 30 % . Plan:     diuresid   Titrate meds as needed. No indication for a cardiac procedure. [x]           High complexity decision making was performed        Subjective:   CHIEF COMPLAINT:  SOB     HISTORY OF PRESENT ILLNESS:       From my last note. :   ADMIT DATE:  2020  DISCHARGE DATE:  2020      PRIMARY CARE:  Alonso Underwood MD     DISCHARGE DIAGNOSES:     1. Acute anterior wall ST elevation myocardial infarction. 2.  Acute systolic heart failure. 3.  Multivessel coronary artery atherosclerosis. 4.  Cardiogenic shock requiring pressor support. 5.  Peripheral arterial disease. 6.  Status post right above knee amputation. 7.  Status post left below knee amputation. 8.  Acute pulmonary edema. 9.  Acute respiratory failure with hypoxemia. 8.  Former smoker.     CONSULTATIONS OBTAINED:  Dion Smith MD, pulmonary intensive care.     PROCEDURES:  1. Left heart catheterization, coronary artery angiography, left ventriculography. 2.  PCI with stenting using Martinez LATONYA proximal left anterior descending artery. 3.  PCI with stenting using Clinton LATONYA proximal circumflex artery.   4.  PCI with stenting Martinez LATONYA proximal right coronary artery.     DISCHARGE MEDICATIONS:  Carvedilol 3.125 mg b.i.d., Plavix 75 mg daily, lisinopril 10 mg daily, hydrochlorothiazide 25 mg daily, Lipitor 40 mg daily, Neurontin 600 mg t.i.d., potassium 20 mEq daily. ASA 81 mg daily.      She was instructed to discontinue the following:  Amlodipine, iron and hydralazine.     FOLLOWUP:  She is to see me in the office in 1 week.     HISTORY AND PHYSICAL: .  Briefly she is a 72-year-old female with a history of vascular disease. She has the remote history of bilateral leg amputation. She had no previous history of heart disease. HOSPITAL COURSE:  Catheterization showed severe coronary artery disease. In particular, the proximal left anterior descending artery had 99% stenosis with LIANA grade I flow. This was treated with PCI using two overlapping Randolph drug-eluting stents beginning at the ostium and extending down to the mid LAD to cover all areas of disease. The stents were dilated using 2.75 mm noncompliant balloon 15 atmospheres. There is 0% residual narrowing. Distal flow was LIANA grade III.     The proximal circumflex artery had 80% stenosis. This was treated with an Martinez LATONYA dilated 2.5 mm at 15 atmospheres. There is 0% residual with LIANA grade III flow. Next the proximal right coronary artery had 90%-95% stenosis. LIANA grade II flow initially. PCI was performed and two overlapping Randolph LATONYA stents were deployed using 0% residual with again LIANA grade III flow on completion.     Left ventriculography showed LVEF of 30%.    The ventricular end-diastolic pressure was elevated at 40 mmHg. The patient was given intravenous Lasix.     She was maintained on intravenous Seb-Synephrine throughout the procedure due to her cardiogenic shock. She was on a non-rebreather mask for oxygenation.   She was transferred to intensive care unit.               Past Medical History:   Diagnosis Date    Arthritis     was in legs    Hypertension     Other ill-defined conditions(159.16)     extremety vascular disease    [facility]     PVD (peripheral vascular disease) ([facility]       Past Surgical History:   Procedure Laterality Date    COLONOSCOPY N/A 2018    COLONOSCOPY performed by Loreatha Hammans, MD at [facility] ENDOSCOPY    HX AMPUTATION      Right Above Knee Amputation    HX AMPUTATION  2011    Left Below Knee Amputation    HX ORTHOPAEDIC  12     Debridement Left BKA Stump and Placement of Wound VA    HX OTHER SURGICAL  10/24/12    SPLIT THICKNESS SKIN GRAFT FROM RIGHT THIGH TO BELOW KNEE AMPUTATION STUMP (to wound left BKA stump    IL VEIN BYPASS GRAFT,FEM-TIBIAL  3/04/2011    Left femoral post. tibial bypass with vein     Allergies   Allergen Reactions    Percocet [Oxycodone-Acetaminophen] Nausea and Vomiting      Meds:  See below  Social History     Tobacco Use    Smoking status: Former Smoker     Quit date: 2008     Years since quittin.7    Smokeless tobacco: Never Used   Substance Use Topics    Alcohol use: No      Family History   Problem Relation Age of Onset    Hypertension Mother     Hypertension Father     Diabetes Sister     Diabetes Brother        REVIEW OF SYSTEMS:     []            Unable to obtain  ROS due to ---   [x]            Total of 12 systems reviewed as follows:    Constitutional: negative fever, negative chills, negative weight loss  Eyes:   negative visual changes  ENT:   negative sore throat, tongue or lip swelling  Respiratory:  negative cough, negative dyspnea  Cards:  negative for chest pain, palpitations, lower extremity edema  GI:   negative for nausea, vomiting, diarrhea, and abdominal pain  Genitourinary: negative for frequency, dysuria  Integument:  negative for rash   Hematologic:  negative for easy bruising and gum/nose bleeding  Musculoskel: negative for myalgias,  back pain  Neurological:  negative for headaches, dizziness, vertigo, weakness  Behavl/Psych: negative for feelings of anxiety, depression     Pertinent Positives include :    Objective:      Physical Exam:    Last 24hrs VS reviewed since prior progress note. Most recent are:    Visit Vitals  BP (!) 142/70 (BP 1 Location: Left arm)   Pulse 66   Temp 97.9 °F (36.6 °C)   Resp 18   Ht 4' 2\" (1.27 m)   Wt 61.2 kg (134 lb 14.7 oz)   LMP  (LMP Unknown)   SpO2 97%   BMI 37.94 kg/m²       Intake/Output Summary (Last 24 hours) at 3/29/2021 0844  Last data filed at 3/28/2021 1807  Gross per 24 hour   Intake 800 ml   Output    Net 800 ml        General Appearance: Well developed, well nourished, alert & oriented x 3,    no acute distress. Ears/Nose/Mouth/Throat: Pupils equal and round, Hearing grossly normal.  Neck: Supple. JVP within normal limits. Carotids good upstrokes, with no bruit. Chest: Lungs clear to auscultation bilaterally. Cardiovascular: Regular rate and rhythm, S1S2 normal, no murmur, rubs, gallops. Abdomen: Soft, non-tender, bowel sounds are active. No organomegaly. Extremities: No edema bilaterally. Femoral pulses +2, Distal Pulses +1. Skin: Warm and dry. Neuro: CN II-XII grossly intact, Strength and sensation grossly intact. Data:      Prior to Admission medications    Medication Sig Start Date End Date Taking? Authorizing Provider   carvediloL (Coreg) 12.5 mg tablet Take 12.5 mg by mouth two (2) times daily (with meals). Yes Provider, Historical   gabapentin (NEURONTIN) 300 mg capsule Take 300 mg by mouth as needed for Pain. Yes Provider, Historical   hydroCHLOROthiazide (HYDRODIURIL) 25 mg tablet Take 25 mg by mouth daily. Yes Provider, Historical   furosemide (LASIX) 20 mg tablet Take 20 mg by mouth daily. Yes Provider, Historical   sacubitriL-valsartan (Entresto) 24-26 mg tablet Take 1 Tab by mouth two (2) times a day. Yes Other, MD Stephani   aspirin delayed-release 81 mg tablet Take  by mouth daily. Yes Other, MD Stephani   trimethoprim-sulfamethoxazole (BACTRIM DS, SEPTRA DS) 160-800 mg per tablet Take 1 Tab by mouth two (2) times a day.    Yes Other, MD Stephani   clopidogreL (PLAVIX) 75 mg tab Take 1 Tab by mouth daily. 5/11/20  Yes Jesus Wheeler MD   atorvastatin (Lipitor) 40 mg tablet Take 40 mg by mouth daily. Yes Stephani Staples MD   potassium chloride (K-DUR) 20 mEq tablet Take 1 Tab by mouth daily.  3/14/11  Yes Marian Brooks MD       Recent Results (from the past 24 hour(s))   ECHO ADULT COMPLETE    Collection Time: 03/28/21 11:45 AM   Result Value Ref Range    IVSd 0.67 0.60 - 0.90 cm    LVIDd 5.23 3.90 - 5.30 cm    LVIDs 4.23 cm    LVOT d 1.46 cm    LVPWd 1.23 (A) 0.60 - 0.90 cm    LVOT Cardiac Output 1.63 liter/minute    LVOT Peak Gradient 3.43 mmHg    LVOT Peak Gradient 3.28 mmHg    Left Ventricular Outflow Tract Mean Gradient 1.55 mmHg    LVOT SV 28.1 mL    LVOT Peak Velocity 90.61 cm/s    LVOT Peak Velocity 92.64 cm/s    LVOT VTI 16.75 cm    RVSP 26.82 mmHg    Left Atrium Major Axis 3.58 cm    LA Area 4C 16.24 cm2    LA Vol 4C 39.61 22.00 - 52.00 mL    Right Atrial Area 4C 20.36 cm2    Est. RA Pressure 10.00 mmHg    Aortic Valve Area by Continuity of Peak Velocity 1.15 cm2    Aortic Valve Area by Continuity of Peak Velocity 1.18 cm2    Aortic Valve Area by Continuity of Peak Velocity 1.14 cm2    Aortic Valve Area by Continuity of Peak Velocity 1.11 cm2    Aortic Valve Area by Continuity of VTI 1.14 cm2    AoV PG 7.45 mmHg    AoV PG 6.95 mmHg    Aortic Valve Systolic Mean Gradient 9.01 mmHg    Aortic Valve Systolic Peak Velocity 043.11 cm/s    Aortic Valve Systolic Peak Velocity 565.19 cm/s    AoV VTI 24.67 cm    MV A Scot 81.52 cm/s    Mitral Valve E Wave Deceleration Time 140.95 ms    MV E Scot 104.33 cm/s    E/E' ratio (averaged) 17.77     E/E' lateral 14.12     E/E' septal 21.42     LV E' Lateral Velocity 7.39 cm/s    LV E' Septal Velocity 4.87 cm/s    MVA VTI 0.92 cm2    TR Max Velocity 247.71 cm/s    MV Peak Gradient 4.92 mmHg    MV Mean Gradient 2.05 mmHg    Mitral Valve Pressure Half-time 75.49 ms    Mitral Valve Max Velocity 110.85 cm/s    Mitral Valve Annulus Velocity Time Integral 30.65 cm    MVA (PHT) 2.91 cm2    Pulmonic Regurgitant End Max Velocity 172.19 cm/s    Pulmonic Valve Systolic Peak Instantaneous Gradient 3.22 mmHg    Pulmonic Valve Systolic Peak Instantaneous Gradient 2.77 mmHg    Pulmonic Valve Systolic Mean Gradient 7.35 mmHg    Pulmonic Valve Max Velocity 83.29 cm/s    Pulmonic Valve Max Velocity 89.74 cm/s    Pulmonic Valve Systolic Velocity Time Integral 17.04 cm    Triscuspid Valve Regurgitation Peak Gradient 16.82 mmHg    TR Max Velocity 204.26 cm/s    Ao Root D 2.75 cm    MV E/A 1.28     LV Mass .2 67.0 - 162.0 g    LV Mass AL Index 132.4 43.0 - 95.0 g/m2    Left Atrium Minor Axis 2.59 cm    LA Vol Index 28.63 16.00 - 28.00 ml/m2    NEHEMIAS/BSA VTI 0.8 PI8/B1   METABOLIC PANEL, BASIC    Collection Time: 03/28/21 11:52 AM   Result Value Ref Range    Sodium 129 (L) 136 - 145 mmol/L    Potassium 4.1 3.5 - 5.1 mmol/L    Chloride 97 97 - 108 mmol/L    CO2 25 21 - 32 mmol/L    Anion gap 7 5 - 15 mmol/L    Glucose 130 (H) 65 - 100 mg/dL    BUN 24 (H) 6 - 20 MG/DL    Creatinine 1.39 (H) 0.55 - 1.02 MG/DL    BUN/Creatinine ratio 17 12 - 20      GFR est AA 45 (L) >60 ml/min/1.73m2    GFR est non-AA 37 (L) >60 ml/min/1.73m2    Calcium 8.6 8.5 - 35.3 MG/DL   METABOLIC PANEL, COMPREHENSIVE    Collection Time: 03/29/21  2:32 AM   Result Value Ref Range    Sodium 129 (L) 136 - 145 mmol/L    Potassium 4.0 3.5 - 5.1 mmol/L    Chloride 97 97 - 108 mmol/L    CO2 26 21 - 32 mmol/L    Anion gap 6 5 - 15 mmol/L    Glucose 112 (H) 65 - 100 mg/dL    BUN 30 (H) 6 - 20 MG/DL    Creatinine 1.64 (H) 0.55 - 1.02 MG/DL    BUN/Creatinine ratio 18 12 - 20      GFR est AA 37 (L) >60 ml/min/1.73m2    GFR est non-AA 30 (L) >60 ml/min/1.73m2    Calcium 8.1 (L) 8.5 - 10.1 MG/DL    Bilirubin, total 0.3 0.2 - 1.0 MG/DL    ALT (SGPT) 33 12 - 78 U/L    AST (SGOT) 32 15 - 37 U/L    Alk.  phosphatase 60 45 - 117 U/L    Protein, total 6.4 6.4 - 8.2 g/dL Albumin 3.2 (L) 3.5 - 5.0 g/dL    Globulin 3.2 2.0 - 4.0 g/dL    A-G Ratio 1.0 (L) 1.1 - 2.2     MAGNESIUM    Collection Time: 03/29/21  2:32 AM   Result Value Ref Range    Magnesium 1.7 1.6 - 2.4 mg/dL   CBC WITH AUTOMATED DIFF    Collection Time: 03/29/21  2:32 AM   Result Value Ref Range    WBC 4.2 3.6 - 11.0 K/uL    RBC 4.49 3.80 - 5.20 M/uL    HGB 10.4 (L) 11.5 - 16.0 g/dL    HCT 30.2 (L) 35.0 - 47.0 %    MCV 67.3 (L) 80.0 - 99.0 FL    MCH 23.2 (L) 26.0 - 34.0 PG    MCHC 34.4 30.0 - 36.5 g/dL    RDW 17.1 (H) 11.5 - 14.5 %    PLATELET 204 (L) 227 - 400 K/uL    MPV 11.1 8.9 - 12.9 FL    NRBC 0.0 0  WBC    ABSOLUTE NRBC 0.00 0.00 - 0.01 K/uL    NEUTROPHILS 53 32 - 75 %    LYMPHOCYTES 27 12 - 49 %    MONOCYTES 20 (H) 5 - 13 %    EOSINOPHILS 0 0 - 7 %    BASOPHILS 0 0 - 1 %    IMMATURE GRANULOCYTES 0 0.0 - 0.5 %    ABS. NEUTROPHILS 2.3 1.8 - 8.0 K/UL    ABS. LYMPHOCYTES 1.1 0.8 - 3.5 K/UL    ABS. MONOCYTES 0.8 0.0 - 1.0 K/UL    ABS. EOSINOPHILS 0.0 0.0 - 0.4 K/UL    ABS. BASOPHILS 0.0 0.0 - 0.1 K/UL    ABS. IMM.  GRANS. 0.0 0.00 - 0.04 K/UL    DF SMEAR SCANNED      RBC COMMENTS TARGET CELLS  1+        RBC COMMENTS ANISOCYTOSIS  1+        RBC COMMENTS MICROCYTOSIS  2+        RBC COMMENTS HYPOCHROMIA  1+       FERRITIN    Collection Time: 03/29/21  2:32 AM   Result Value Ref Range    Ferritin 144 26 - 388 NG/ML   CK    Collection Time: 03/29/21  2:32 AM   Result Value Ref Range     (H) 26 - 192 U/L   IRON PROFILE    Collection Time: 03/29/21  2:32 AM   Result Value Ref Range    Iron 31 (L) 35 - 150 ug/dL    TIBC 194 (L) 250 - 450 ug/dL    Iron % saturation 16 (L) 20 - 50 %

## 2021-03-29 NOTE — PROGRESS NOTES
Transition of Care Plan:    RUR: 18% - Low  Disposition: Home with family assistance  Follow up appointments: PCP, Cardiologist  DME needed: No needs identified at this time. Pt is has a wheelchair, RW, and cane. Pt is wheelchair bound. Transportation at Discharge: Pt's  or daughter in law  [address] or means to access home:  Yes, pt's       IM Medicare letter: To be given prior to d/c  Caregiver Contact: Spouse, Sundar Ragsdale ([phone])  Discharge Caregiver contacted prior to discharge? CM will contact pt's cg    Reason for Admission:   CHF exacerbation, Abdominal Pain, Anorexia                     RUR Score:       18%              Plan for utilizing home health: There are no HH needs at this time      PCP: First and Last name:  Robin Wolf MD     Name of Practice: [facility]   Are you a current patient: Yes/No: Yes   Approximate date of last visit: Last week   Can you participate in a virtual visit with your PCP: Yes                     Current Advanced Directive/Advance Care Plan: Full Code. Pt has no ACP docs on file. Pt's  is her [address]. CM discussed ACP with patient      Healthcare Decision Maker:   Joseph Finney ([phone])                          Transition of Care Plan:    Home with family assistance    CM met with patient at the bedside to complete initial assessment. CM introduced role, verified demographics, and discussed discharge planning. Pt is a 69 yo female with an admitting diagnosis of  CHF exacerbation, Abdominal Pain, and Anorexia. Pt's PMHx includes HTN, PVD, NSTEMI, COPD. Pt has [employer] and uses [employer] in [place]. Pt lives at home with her  in a one-story home with 2 ELLIOT. Pt requires some assistance with ADLs and IADLs. Pt's  or daughter in law transport her to her appointments. Pt has a hx of SNF and HH however doesn't remember the names of the facilities or agencies.  Pt has no hx of IPR. Pt's  or daughter in law will transport her home once she is d/c from the hospital.    CM will continue to follow for discharge planning while patient is admitted on current unit. Please contact this CM with questions or issues related to discharge. Care Management Interventions  PCP Verified by CM: Yes(Dr. Roddy Brennan - last visit was last week)  Mode of Transport at Discharge: Other (see comment)(Pt's  or daughter in law)  Transition of Care Consult (CM Consult): Discharge Planning(Home with family assistance)  Discharge Durable Medical Equipment: No(Pt has a wheelchair, RW, and cane at home)  Physical Therapy Consult: No  Occupational Therapy Consult: No  Speech Therapy Consult: No  Current Support Network: Lives with Spouse(Pt lives with her spouse in a one story home with 2 ELLIOT)  Confirm Follow Up Transport: Family(Pt's  or daughter in law)  Discharge Location  Discharge Placement: Home with family assistance  ----------------------------------------------------------------  [address] (ACP) Conversation      Date of Conversation: 3/29/2021  Conducted with: Patient with Decision Making Capacity    Healthcare Decision Maker:     Primary Decision Maker: Radha Austin - [phone]  Today we documented Decision Maker(s) consistent with Legal Next of Kin hierarchy.     Content/Action Overview:   DECLINED ACP conversation - will revisit periodically   Reviewed DNR/DNI and patient elects Full Code (Attempt Resuscitation)         Length of Voluntary ACP Conversation in minutes:  <16 minutes (Non-Billable)     JANES Maria  Care Manager [facility]  [phone]

## 2021-03-30 VITALS
HEART RATE: 65 BPM | OXYGEN SATURATION: 100 % | HEIGHT: 55 IN | WEIGHT: 134.92 LBS | SYSTOLIC BLOOD PRESSURE: 129 MMHG | RESPIRATION RATE: 18 BRPM | BODY MASS INDEX: 31.22 KG/M2 | TEMPERATURE: 98.4 F | DIASTOLIC BLOOD PRESSURE: 60 MMHG

## 2021-03-30 LAB
ANION GAP SERPL CALC-SCNC: 7 MMOL/L (ref 5–15)
APPEARANCE UR: CLEAR
BACTERIA URNS QL MICRO: NEGATIVE /HPF
BILIRUB UR QL: NEGATIVE
BUN SERPL-MCNC: 30 MG/DL (ref 6–20)
BUN/CREAT SERPL: 22 (ref 12–20)
CALCIUM SERPL-MCNC: 7.7 MG/DL (ref 8.5–10.1)
CAOX CRY URNS QL MICRO: ABNORMAL
CHLORIDE SERPL-SCNC: 96 MMOL/L (ref 97–108)
CO2 SERPL-SCNC: 27 MMOL/L (ref 21–32)
COLOR UR: ABNORMAL
CREAT SERPL-MCNC: 1.36 MG/DL (ref 0.55–1.02)
CREAT UR-MCNC: 71.4 MG/DL
EOSINOPHIL #/AREA URNS HPF: NEGATIVE /[HPF]
EPITH CASTS URNS QL MICRO: ABNORMAL /LPF
ERYTHROCYTE [DISTWIDTH] IN BLOOD BY AUTOMATED COUNT: 17 % (ref 11.5–14.5)
GLUCOSE SERPL-MCNC: 73 MG/DL (ref 65–100)
GLUCOSE UR STRIP.AUTO-MCNC: NEGATIVE MG/DL
HCT VFR BLD AUTO: 34 % (ref 35–47)
HGB BLD-MCNC: 11.6 G/DL (ref 11.5–16)
HGB UR QL STRIP: NEGATIVE
KETONES UR QL STRIP.AUTO: NEGATIVE MG/DL
LEUKOCYTE ESTERASE UR QL STRIP.AUTO: ABNORMAL
MAGNESIUM SERPL-MCNC: 1.8 MG/DL (ref 1.6–2.4)
MCH RBC QN AUTO: 22.9 PG (ref 26–34)
MCHC RBC AUTO-ENTMCNC: 34.1 G/DL (ref 30–36.5)
MCV RBC AUTO: 67.2 FL (ref 80–99)
NITRITE UR QL STRIP.AUTO: NEGATIVE
NRBC # BLD: 0 K/UL (ref 0–0.01)
NRBC BLD-RTO: 0 PER 100 WBC
PH UR STRIP: 6 [PH] (ref 5–8)
PHOSPHATE SERPL-MCNC: 3.9 MG/DL (ref 2.6–4.7)
PLATELET # BLD AUTO: 130 K/UL (ref 150–400)
POTASSIUM SERPL-SCNC: 3.8 MMOL/L (ref 3.5–5.1)
PROT UR STRIP-MCNC: NEGATIVE MG/DL
PROT UR-MCNC: 9 MG/DL (ref 0–11.9)
PROT/CREAT UR-RTO: 0.1
RBC # BLD AUTO: 5.06 M/UL (ref 3.8–5.2)
RBC #/AREA URNS HPF: ABNORMAL /HPF (ref 0–5)
SODIUM SERPL-SCNC: 130 MMOL/L (ref 136–145)
SP GR UR REFRACTOMETRY: 1.01 (ref 1–1.03)
UROBILINOGEN UR QL STRIP.AUTO: 0.2 EU/DL (ref 0.2–1)
WBC # BLD AUTO: 4.2 K/UL (ref 3.6–11)
WBC URNS QL MICRO: ABNORMAL /HPF (ref 0–4)

## 2021-03-30 PROCEDURE — 84100 ASSAY OF PHOSPHORUS: CPT

## 2021-03-30 PROCEDURE — 81001 URINALYSIS AUTO W/SCOPE: CPT

## 2021-03-30 PROCEDURE — 74011250637 HC RX REV CODE- 250/637: Performed by: GENERAL ACUTE CARE HOSPITAL

## 2021-03-30 PROCEDURE — 36415 COLL VENOUS BLD VENIPUNCTURE: CPT

## 2021-03-30 PROCEDURE — 87205 SMEAR GRAM STAIN: CPT

## 2021-03-30 PROCEDURE — 82570 ASSAY OF URINE CREATININE: CPT

## 2021-03-30 PROCEDURE — 74011250637 HC RX REV CODE- 250/637: Performed by: HOSPITALIST

## 2021-03-30 PROCEDURE — 83735 ASSAY OF MAGNESIUM: CPT

## 2021-03-30 PROCEDURE — 77030027138 HC INCENT SPIROMETER -A

## 2021-03-30 PROCEDURE — 80048 BASIC METABOLIC PNL TOTAL CA: CPT

## 2021-03-30 PROCEDURE — 94762 N-INVAS EAR/PLS OXIMTRY CONT: CPT

## 2021-03-30 PROCEDURE — 74011250636 HC RX REV CODE- 250/636: Performed by: GENERAL ACUTE CARE HOSPITAL

## 2021-03-30 PROCEDURE — 85027 COMPLETE CBC AUTOMATED: CPT

## 2021-03-30 RX ORDER — POTASSIUM CHLORIDE 20 MEQ/1
10 TABLET, EXTENDED RELEASE ORAL DAILY
Qty: 60 TAB | Refills: 0 | Status: SHIPPED | OUTPATIENT
Start: 2021-03-30

## 2021-03-30 RX ORDER — FUROSEMIDE 40 MG/1
40 TABLET ORAL DAILY
Qty: 30 TAB | Refills: 0 | Status: ON HOLD | OUTPATIENT
Start: 2021-03-30 | End: 2021-05-20 | Stop reason: SDUPTHER

## 2021-03-30 RX ADMIN — Medication 5 ML: at 05:00

## 2021-03-30 RX ADMIN — FERROUS SULFATE TAB 325 MG (65 MG ELEMENTAL FE) 325 MG: 325 (65 FE) TAB at 09:02

## 2021-03-30 RX ADMIN — CLOPIDOGREL BISULFATE 75 MG: 75 TABLET ORAL at 09:02

## 2021-03-30 RX ADMIN — FUROSEMIDE 40 MG: 10 INJECTION, SOLUTION INTRAMUSCULAR; INTRAVENOUS at 09:02

## 2021-03-30 RX ADMIN — ATORVASTATIN CALCIUM 40 MG: 40 TABLET, FILM COATED ORAL at 09:01

## 2021-03-30 RX ADMIN — HEPARIN SODIUM 5000 UNITS: 5000 INJECTION INTRAVENOUS; SUBCUTANEOUS at 09:02

## 2021-03-30 RX ADMIN — CARVEDILOL 12.5 MG: 12.5 TABLET, FILM COATED ORAL at 09:02

## 2021-03-30 RX ADMIN — ASPIRIN 81 MG: 81 TABLET, COATED ORAL at 09:01

## 2021-03-30 NOTE — DISCHARGE SUMMARY
Hospitalist Discharge Summary     Patient ID:  Endy Mcgovern  [id number]  19 y.o.  1945  3/27/2021    PCP on record: Elia Weems MD    Admit date: 3/27/2021  Discharge date and time: 3/30/2021    DISCHARGE DIAGNOSIS:    Acute on chronic systolic heart failure with EF of 25%  COPD  Multivessel coronary artery disease, status post PCI/stenting of proximal LAD, proximal circumflex artery and proximal RCA, on 5/8/2020  PAD bilateral lower extremity amputation  BONNIE              CONSULTATIONS:  IP CONSULT TO NEPHROLOGY    Excerpted HPI from H&P of Trell Dai MD:  Danielle Martinez is a 68 y.o.  female who presents with the above CC. Patient has been complaining of anorexia that has been going on for few weeks now, had episodes of nonbloody vomiting, and on and off abdominal pain. No reported diarrhea. She has been also complaining of shortness of breath that has been getting worse gradually, noted to have somewhat cough, complains of lower extremity edema. Patient denies fever, chills, flulike symptoms, sick contact, she had her first shot of Covid vaccine few weeks ago.       Work-up significant for hemoglobin of 10.9, MCV 68, platelet 246, sodium 126, potassium 5.7, chloride 96, BUN 21, creatinine 1.3, AST 45, , CK-MB 18, negative troponin and NT proBNP 20 K.     Xr Chest Port  Mild pulmonary edema versus interstitial bilateral pneumonia. Bilateral small pleural effusions. Consider PA and lateral chest views when the patient can better tolerate. EKG with sinus rhythm, 60 bpm, poor R wave progression.     We were asked to admit for work up and evaluation of the above problems. ______________________________________________________________________  DISCHARGE SUMMARY/HOSPITAL COURSE:  for full details see H&P, daily progress notes, labs, consult notes.        Acute on chronic systolic heart failure with EF of 25%  COPD exacerbation, mild resolved  Rapid Covid negative, low suspicious clinically    procalcitonin < 0.05; troponin negative   CT chest: Fluid overload. Mild pulmonary edema. Decreased small right and trace left  pleural effusions. Mild cardiomegaly and small pericardial effusion, both new. Cont to diureses with IV Lasix  Patient given Solu-Medrol 125 mg in ER and started on Solu-Medrol 40 twice daily and Duoneb q6h ---> clinically HF wo copd exacerbation, so stopped steroids   Echo: EF 25%   Primary cardiology consulted   - med rec done: HCTZ and recently she was prescribed furosemide 20 mg, but the patient took only 1 pill  She diuresed well with lasix 40 IV daily. Switched to Lasix 40 PO daily. Multivessel coronary artery disease, status post PCI/stenting of proximal LAD, proximal circumflex artery and proximal RCA, on 5/8/2020  PAD bilateral lower extremity amputation  Continue aspirin/Plavix   Hold statin given elevated CPK  Continue beta-blocker     Abdominal pain  Vomiting episodes  Weight loss  - resolved   CT A/P: wo acute findings      BONNIE with hyperkalemia  Hyponatremia,  acute on chronic   Likely dilutional hyponatremia and elevated cr due to poor perfusion status and use of Bactrim    Will Hold Entresto/HCTZ on discharge.  -advised to follow up with PCP/cardiologist regarding resumption       monitor closely. Avoid nephrotoxic drugs, adjust all meds to GFR.      Elevated CPK level resolved   Start back statin      Hypochromic microcytic anemia  Hb stable, iron 31 --> add iron po               Code status: Full  Prophylaxis: Lovenox          _______________________________________________________________________  Patient seen and examined by me on discharge day. Pertinent Findings:  Gen:    Not in distress  Chest: Clear lungs  CVS:   Regular rhythm.   Trace edema b/l  Abd:  Soft, not distended, not tender  Neuro:  Alert,   _______________________________________________________________________  DISCHARGE MEDICATIONS:   Discharge Medication List as of 3/30/2021 11:33 AM      CONTINUE these medications which have CHANGED    Details   furosemide (LASIX) 40 mg tablet Take 1 Tab by mouth daily. , Normal, Disp-30 Tab, R-0      potassium chloride (K-DUR, KLOR-CON) 20 mEq tablet Take 0.5 Tabs by mouth daily. , Normal, Disp-60 Tab, R-0         CONTINUE these medications which have NOT CHANGED    Details   carvediloL (Coreg) 12.5 mg tablet Take 12.5 mg by mouth two (2) times daily (with meals). , Historical Med      gabapentin (NEURONTIN) 300 mg capsule Take 300 mg by mouth as needed for Pain., Historical Med      aspirin delayed-release 81 mg tablet Take  by mouth daily. , Historical Med      clopidogreL (PLAVIX) 75 mg tab Take 1 Tab by mouth daily. , Print, Disp-30 Tab, R-12      atorvastatin (Lipitor) 40 mg tablet Take 40 mg by mouth daily. , Historical Med         STOP taking these medications       hydroCHLOROthiazide (HYDRODIURIL) 25 mg tablet Comments:   Reason for Stopping:         sacubitriL-valsartan (Entresto) 24-26 mg tablet Comments:   Reason for Stopping:         trimethoprim-sulfamethoxazole (BACTRIM DS, SEPTRA DS) 160-800 mg per tablet Comments:   Reason for Stopping:         lisinopriL (PRINIVIL, ZESTRIL) 5 mg tablet Comments:   Reason for Stopping:         HYDROmorphone (DILAUDID) 4 mg tablet Comments:   Reason for Stopping:                 Patient Follow Up Instructions: Activity: Activity as tolerated  Diet: Cardiac Diet  Wound Care: None needed        Follow-up Information     Follow up With Specialties Details Why Contact Info    Leopold Dibble, MD Family Medicine Schedule an appointment as soon as possible for a visit in 1 week Office will call you to schedule an appointment once discharged.   [address] [address]      Stanton Plummer MD Cardiology In 2 weeks  [address]  [address]  [address] [address] [phone] ________________________________________________________________    Risk of deterioration: Moderate    Condition at Discharge:  Stable  __________________________________________________________________    Disposition  Home with family, no needs    ____________________________________________________________________    Code Status: Full Code  ___________________________________________________________________      Total time in minutes spent coordinating this discharge (includes going over instructions, follow-up, prescriptions, and preparing report for sign off to her PCP) :  >30 minutes    Signed:  Beverly Elliott MD

## 2021-03-30 NOTE — PROGRESS NOTES
TRANSFER - OUT REPORT:    Verbal report given to Jenna Singh RN (name) on Peter Booker  being transferred to Oncology (room [phone]) (unit) for routine progression of care  (patient COVID negative)     Report consisted of patients Situation, Background, Assessment and   Recommendations(SBAR). Information from the following report(s) SBAR, Kardex, Intake/Output, MAR, Recent Results and Cardiac Rhythm 1st Degree AV Block overnight (during day said he was Sinus Arrhythmia and Sinus Lorrene Cordia) was reviewed with the receiving nurse. Lines:   Peripheral IV 98/76/82 Right Basilic (Active)   Site Assessment Clean, dry, & intact 03/30/21 0412   Phlebitis Assessment 0 03/30/21 0412   Infiltration Assessment 0 03/30/21 0412   Dressing Status Clean, dry, & intact 03/30/21 0412   Dressing Type Tape;Transparent 03/30/21 0412   Hub Color/Line Status Pink;Capped 03/30/21 7717   Action Taken Open ports on tubing capped 03/30/21 0412   Alcohol Cap Used Yes 03/30/21 0412        Opportunity for questions and clarification was provided.       Patient transported with:   Registered Nurse - Branda Dakins Mayo Asa, RN) and Annelle Kocher, RN - day shift nurses and PCT Hazel Doretha) to transport patient to Oncology

## 2021-03-30 NOTE — PROGRESS NOTES
Progress Note      3/30/2021 1:15 PM  NAME: Digna Hendricks   MRN:  [id number]   Admit Diagnosis: CHF exacerbation ([facility] [I50.9]; Abdominal pain [R10.9]; Anorexia [R63.0]     Assessment:     Acute on chronic systolic heart failure. LV EF ~ 30 % . Hx of Anterior MI 2020 . Anterior wall is akinetic . PAD   S/p bilateral  Leg amputaions. Hyperlipidemia   Acute on chronic kidney disease.         Echo reviewed :   Mild LV dilatation . Severe LV dysfunction . The Anterior wall and apex are akinetic owing to her acute Anterior MI last May . The inferior and lateral walls contract reasonably , though not normal.   EF looks to be about 30 % . Plan:     Continue heart failure meds, adjust as tolerated and as needed. Resume ACEI when OK  With renal.         [x]        High complexity decision making was performed    Subjective:     Digna Hendricks denies chest pain, dyspnea. Discussed with RN events overnight.      Patient Active Problem List   Diagnosis Code    HTN (hypertension) I10    Depressive disorder, not elsewhere classified F32.9    PVD (peripheral vascular disease) ([facility] I73.9    Nausea with vomiting R11.2    HTN (hypertension) I10    Chronic hyponatremia E87.1    Sinus bradycardia R00.1    MRSA infection within last 3 months Z86.14    Status post skin graft Z94.5    STEMI (ST elevation myocardial infarction) ([facility] I21.3    STEMI involving left anterior descending coronary artery (HCC) I21.02    Anorexia R63.0    Abdominal pain R10.9    CHF exacerbation (HCC) I50.9       Review of Systems:    Symptom Y/N Comments  Symptom Y/N Comments   Fever/Chills N   Chest Pain N    Poor Appetite N   Edema N    Cough N   Abdominal Pain N    Sputum N   Joint Pain N    SOB/BRINK N   Pruritis/Rash N    Nausea/vomit N   Tolerating PT/OT Y    Diarrhea N   Tolerating Diet Y    Constipation N   Other       Could NOT obtain due to:      Objective:      Physical Exam:    Last 24hrs VS reviewed since prior progress note. Most recent are:    Visit Vitals  /60   Pulse 65   Temp 98.4 °F (36.9 °C)   Resp 18   Ht 4' 2\" (1.27 m)   Wt 61.2 kg (134 lb 14.7 oz)   LMP  (LMP Unknown)   SpO2 100%   BMI 37.94 kg/m²       Intake/Output Summary (Last 24 hours) at 3/30/2021 1315  Last data filed at 3/30/2021 0414  Gross per 24 hour   Intake    Output 180 ml   Net -180 ml        General Appearance: Well developed, well nourished, alert & oriented x 3,    no acute distress. Ears/Nose/Mouth/Throat: Hearing grossly normal.  Neck: Supple. Chest: Lungs clear to auscultation bilaterally. Cardiovascular: Regular rate and rhythm, S1S2 normal, no murmur. Abdomen: Soft, non-tender, bowel sounds are active. Extremities: No edema bilaterally. Skin: Warm and dry. PMH/SH reviewed - no change compared to H&P    Data Review    Telemetry: normal sinus rhythm     Lab Data Personally Reviewed:    Recent Labs     03/30/21  0358 03/29/21  0232   WBC 4.2 4.2   HGB 11.6 10.4*   HCT 34.0* 30.2*   * 144*   LABRCNT(INR:3,PTP:3,APTT:3,)  Recent Labs     03/30/21  0358 03/29/21  0232 03/28/21  1152   * 129* 129*   K 3.8 4.0 4.1   CL 96* 97 97   CO2 27 26 25   BUN 30* 30* 24*   CREA 1.36* 1.64* 1.39*   GLU 73 112* 130*   CA 7.7* 8.1* 8.6   MG 1.8 1.7  --    LABRCNT(CPK:3,CpKMB:3,ckndx:3,troiq:3)  Lab Results   Component Value Date/Time    Cholesterol, total 144 08/19/2012 04:00 AM    HDL Cholesterol 43 08/19/2012 04:00 AM    LDL, calculated 86.6 08/19/2012 04:00 AM    Triglyceride 72 08/19/2012 04:00 AM    CHOL/HDL Ratio 3.3 08/19/2012 04:00 AM   LABRCNT(sgot:3,gpt:3,ap:3,tbiL:3,TP:3,ALB:3,GLOB:3,ggt:3,aml:3,amyp:3,lpse:3,hlpse:3)No results for input(s): PH, PCO2, PO2 in the last 72 hours.   Lab Results   Component Value Date/Time    Cholesterol, total 144 08/19/2012 04:00 AM    HDL Cholesterol 43 08/19/2012 04:00 AM    LDL, calculated 86.6 08/19/2012 04:00 AM    Triglyceride 72 08/19/2012 04:00 AM    CHOL/HDL Ratio 3.3 08/19/2012 04:00 AM   White HospitalTimothy Cecily Lanes, MD  No results for input(s): PH, PCO2, PO2 in the last 72 hours.     Medications Personally Reviewed:    Current Facility-Administered Medications   Medication Dose Route Frequency    ferrous sulfate tablet 325 mg  1 Tab Oral DAILY WITH BREAKFAST    aspirin delayed-release tablet 81 mg  81 mg Oral DAILY    atorvastatin (LIPITOR) tablet 40 mg  40 mg Oral DAILY    carvediloL (COREG) tablet 12.5 mg  12.5 mg Oral BID WITH MEALS    clopidogreL (PLAVIX) tablet 75 mg  75 mg Oral DAILY    sodium chloride (NS) flush 5-40 mL  5-40 mL IntraVENous Q8H    sodium chloride (NS) flush 5-40 mL  5-40 mL IntraVENous PRN    acetaminophen (TYLENOL) tablet 650 mg  650 mg Oral Q6H PRN    Or    acetaminophen (TYLENOL) suppository 650 mg  650 mg Rectal Q6H PRN    polyethylene glycol (MIRALAX) packet 17 g  17 g Oral DAILY PRN    promethazine (PHENERGAN) tablet 12.5 mg  12.5 mg Oral Q6H PRN    Or    ondansetron (ZOFRAN) injection 4 mg  4 mg IntraVENous Q6H PRN    heparin (porcine) injection 5,000 Units  5,000 Units SubCUTAneous Q8H    furosemide (LASIX) injection 40 mg  40 mg IntraVENous DAILY    gabapentin (NEURONTIN) capsule 300 mg  300 mg Oral Q12H PRN    albuterol-ipratropium (DUO-NEB) 2.5 MG-0.5 MG/3 ML  3 mL Nebulization Q4H PRN         Delroy Murrell MD

## 2021-03-30 NOTE — PROGRESS NOTES
Problem: Falls - Risk of  Goal: *Absence of Falls  Description: Document Maryanne Lewis Fall Risk and appropriate interventions in the flowsheet.   Outcome: Progressing Towards Goal  Note: Fall Risk Interventions:  Mobility Interventions: Communicate number of staff needed for ambulation/transfer, Patient to call before getting OOB, PT Consult for mobility concerns, Utilize walker, cane, or other assistive device         Medication Interventions: Patient to call before getting OOB, Teach patient to arise slowly    Elimination Interventions: Call light in reach, Patient to call for help with toileting needs, Toileting schedule/hourly rounds

## 2021-03-30 NOTE — PROGRESS NOTES
NAME: Geo Gonzalez        :  1945        MRN:  [id number]                     Assessment   :                                               Plan:  BONNIE  Hyponatremia     Acute on chronic systolic HF (EF 78%)  Volume overload     PAD (left BKA, right AKA) Creatinine better 1.6 to 1.4 with needed diuresis     CT (3/28): Kidneys: Bilateral nonobstructing nephrolithiasis.     Sodium deanne 126, now 129 to 130     On Lasix 40 mg IV daily (home dose 20 mg daily); home hct and entresto on hold     [place] urine sediment, no urine protein:creatinine ratio; pending urine eos     Mildly high CK     Likely cardiorenal syndrome    Stable for discharge from a kidney standpoint          Subjective:     Chief Complaint: denies complaint. Hopes to go home today. Swelling and SOB resolved. No n/v.  Review of Systems:    Symptom Y/N Comments  Symptom Y/N Comments   Fever/Chills    Chest Pain     Poor Appetite    Edema     Cough    Abdominal Pain     Sputum    Joint Pain     SOB/BRINK    Pruritis/Rash     Nausea/vomit    Tolerating PT/OT     Diarrhea    Tolerating Diet     Constipation    Other       Could not obtain due to:      Objective:     VITALS:   Last 24hrs VS reviewed since prior progress note.  Most recent are:  Visit Vitals  /67 (BP 1 Location: Left upper arm, BP Patient Position: At rest)   Pulse (!) 56   Temp 98.4 °F (36.9 °C)   Resp 16   Ht 4' 2\" (1.27 m)   Wt 61.2 kg (134 lb 14.7 oz)   SpO2 98%   BMI 37.94 kg/m²       Intake/Output Summary (Last 24 hours) at 3/30/2021 0655  Last data filed at 3/30/2021 0414  Gross per 24 hour   Intake    Output 580 ml   Net -580 ml      Telemetry Reviewed:     PHYSICAL EXAM:  General: NAD      Lab Data Reviewed: (see below)    Medications Reviewed: (see below)    PMH/SH reviewed - no change compared to H&P  ________________________________________________________________________  Care Plan discussed with:  Patient     Family      RN     Care Manager                    Consultant:          Comments   >50% of visit spent in counseling and coordination of care       ________________________________________________________________________  Norma Howell MD     Procedures: see electronic medical records for all procedures/Xrays and details which  were not copied into this note but were reviewed prior to creation of Plan. LABS:  Recent Labs     03/30/21 0358 03/29/21 0232   WBC 4.2 4.2   HGB 11.6 10.4*   HCT 34.0* 30.2*   * 144*     Recent Labs     03/30/21 0358 03/29/21 0232 03/28/21  1152   * 129* 129*   K 3.8 4.0 4.1   CL 96* 97 97   CO2 27 26 25   BUN 30* 30* 24*   CREA 1.36* 1.64* 1.39*   GLU 73 112* 130*   CA 7.7* 8.1* 8.6   MG 1.8 1.7  --    PHOS 3.9  --   --      Recent Labs     03/29/21 0232 03/27/21 1919   AP 60 66   TP 6.4 6.7   ALB 3.2* 3.4*   GLOB 3.2 3.3     No results for input(s): INR, PTP, APTT, INREXT in the last 72 hours. Recent Labs     03/29/21 0232   TIBC 194*   PSAT 16*   FERR 144      No results found for: FOL, RBCF   No results for input(s): PH, PCO2, PO2 in the last 72 hours.   Recent Labs     03/29/21 0232 03/27/21 1919   *  --    CKMB  --  18.9*     No components found for: [place]  Lab Results   Component Value Date/Time    Color YELLOW/STRAW 03/30/2021 04:01 AM    Appearance CLEAR 03/30/2021 04:01 AM    Specific gravity 1.010 03/30/2021 04:01 AM    pH (UA) 6.0 03/30/2021 04:01 AM    Protein Negative 03/30/2021 04:01 AM    Glucose Negative 03/30/2021 04:01 AM    Ketone Negative 03/30/2021 04:01 AM    Bilirubin Negative 03/30/2021 04:01 AM    Urobilinogen 0.2 03/30/2021 04:01 AM    Nitrites Negative 03/30/2021 04:01 AM    Leukocyte Esterase TRACE (A) 03/30/2021 04:01 AM    Epithelial cells FEW 03/30/2021 04:01 AM    Bacteria Negative 03/30/2021 04:01 AM    WBC 0-4 03/30/2021 04:01 AM    RBC 0-5 03/30/2021 04:01 AM       MEDICATIONS:  Current Facility-Administered Medications   Medication Dose Route Frequency    ferrous sulfate tablet 325 mg  1 Tab Oral DAILY WITH BREAKFAST    aspirin delayed-release tablet 81 mg  81 mg Oral DAILY    atorvastatin (LIPITOR) tablet 40 mg  40 mg Oral DAILY    carvediloL (COREG) tablet 12.5 mg  12.5 mg Oral BID WITH MEALS    clopidogreL (PLAVIX) tablet 75 mg  75 mg Oral DAILY    sodium chloride (NS) flush 5-40 mL  5-40 mL IntraVENous Q8H    sodium chloride (NS) flush 5-40 mL  5-40 mL IntraVENous PRN    acetaminophen (TYLENOL) tablet 650 mg  650 mg Oral Q6H PRN    Or    acetaminophen (TYLENOL) suppository 650 mg  650 mg Rectal Q6H PRN    polyethylene glycol (MIRALAX) packet 17 g  17 g Oral DAILY PRN    promethazine (PHENERGAN) tablet 12.5 mg  12.5 mg Oral Q6H PRN    Or    ondansetron (ZOFRAN) injection 4 mg  4 mg IntraVENous Q6H PRN    heparin (porcine) injection 5,000 Units  5,000 Units SubCUTAneous Q8H    furosemide (LASIX) injection 40 mg  40 mg IntraVENous DAILY    gabapentin (NEURONTIN) capsule 300 mg  300 mg Oral Q12H PRN    albuterol-ipratropium (DUO-NEB) 2.5 MG-0.5 MG/3 ML  3 mL Nebulization Q4H PRN

## 2021-03-30 NOTE — DISCHARGE INSTRUCTIONS
HOSPITALIST DISCHARGE INSTRUCTIONS    NAME: Darshan Will   :  1945   MRN:  [id number]     Date/Time:  3/30/2021 11:09 AM    ADMIT DATE: 3/27/2021   DISCHARGE DATE: 3/30/2021     Acute on chronic systolic heart failure with EF of 25%  COPD  Multivessel coronary artery disease, status post PCI/stenting of proximal LAD, proximal circumflex artery and proximal RCA, on 2020  PAD bilateral lower extremity amputation  BONNIE    -Discuss with your PCP/cardiologist regarding when to resume Entresto/HCTZ    · It is important that you take the medication exactly as they are prescribed. · Keep your medication in the bottles provided by the pharmacist and keep a list of the medication names, dosages, and times to be taken in your wallet. · Do not take other medications without consulting your doctor. What to do at [address]:  Resume previous diet    Recommended activity: Activity as tolerated      If you have questions regarding the hospital related prescriptions or hospital related issues please call [facility] at [phone]. You can always direct your questions to your primary care doctor if you are unable to reach your hospital physician; your PCP works as an extension of your hospital doctor just like your hospital doctor is an extension of your PCP for your time at the hospital [facility], [facility])    If you experience any of the following symptoms then please call your primary care physician or return to the emergency room if you cannot get hold of your doctor:    Fever, chills, nausea, vomiting, or persistent diarrhea  Worsening weakness or new problems with your speech or balance  Dark stools or visible blood in your stools  New Leg swelling or shortness of breath as these could be signs of a clot    Additional Instructions:      Bring these papers with you to your follow up appointments.  The papers will help your doctors be sure to continue the care plan from the [facility].              Information obtained by :  I understand that if any problems occur once I am at home I am to contact my physician. I understand and acknowledge receipt of the instructions indicated above.                                                                                                                                            Physician's or R.N.'s Signature                                                                  Date/Time                                                                                                                                              Patient or Representative Signature

## 2021-03-30 NOTE — PROGRESS NOTES
End of Shift Note    Bedside shift change report given to Hermilo Guillen, RNs (oncoming nurses) by Duc Govea RN (offgoing nurse). Report included the following information SBAR, Kardex, Intake/Output, MAR, Recent Results and Cardiac Rhythm 1st Degree AV Block    Shift worked: [id number]     Shift summary and any significant changes:    No significant changes overnight - Patient rested well most of the night.    -Sent urinalysis labs, gabino labs this morning, and gave meds per orders. Patient O2 sats were fine overnight on room air between 96-98%. -Patient was also bradycardiac overnight (56-58 bpms for HR when low)    -Notified day shift nurses that daily weight needs to be completed before breakfast if possible. Patient does not have scale on bed and patient has left BKA and right AKA - so not able to stand on standing scale and no prosthesis with patient. Concerns for physician to address: N/A     Zone phone for oncoming shift:  [phone]       Activity:  Activity Level: Up with Assistance  Number times ambulated in hallways past shift: 0  Number of times OOB to chair past shift: 0    Cardiac:   Cardiac Monitoring: Yes      Cardiac Rhythm: 1st Degree AV Block    Access:   Current line(s): PIV     Genitourinary:   Urinary status: voiding and external catheter    Respiratory:   O2 Device: Room air  Chronic home O2 use?: NO  Incentive spirometer at bedside: NO     GI:  Last Bowel Movement Date: 03/28/21  Current diet:  DIET CARDIAC Regular  Passing flatus: YES  Tolerating current diet: YES  % Diet Eaten: 100 %    Pain Management:   Patient states pain is manageable on current regimen: N/A (no complaints of pain overnight)    Skin:  Samy Score: 17  Interventions: increase time out of bed, PT/OT consult and internal/external urinary devices    Patient Safety:  Fall Score:  Total Score: 3  Interventions: assistive device (walker, cane, etc), pt to call before getting OOB and stay with me (per policy)  High Fall Risk: Yes    Length of Stay:  Expected LOS: 3d 2h  Actual LOS: [facility]

## 2021-03-30 NOTE — PROGRESS NOTES
Transition of Care Plan:     RUR: 19% - Low  Disposition: Home with family assistance  Follow up appointments: PCP, Cardiologist  DME needed: No needs identified at this time. Pt is has a wheelchair, RW, and cane. Pt is wheelchair bound. Transportation at Discharge: daughter in law, present at [address] or means to access home:  Yes, pt's       IM Medicare letter: reviewed on 3/30/21  Caregiver Contact: Spouse, Kashif Figueroa ([phone])  Discharge Caregiver contacted prior to discharge? yes    CM aware of discharge order. Met with pt and daughter in law at bedside this AM to finalize and review discharge plan. Medicare pt has received, reviewed, and signed 2nd IM letter informing them of their right to appeal the discharge. Signed copy has been placed on pt bedside chart. Pt was provided with copy of letter to keep. Consult received for PCP follow up. CM contacted PCP office and per representative, office will call pt directly after discharge to schedule an appointment with Dr. Ifeoma Ramsay. Pt states that she already has an appointment with Cardiology scheduled for Monday, 4/5. Pt voiced no further questions/concerns regarding discharge. She has access to a wheelchair and BSC at home. Daughter in law will transport pt home today. Daughter in law asked for incentive spirometer for pt to take home. Informed assigned RN of request.    Pt is ready for d/c from a CM standpoint. Assigned RN informed. Care Management Interventions  PCP Verified by CM: Yes  Palliative Care Criteria Met (RRAT>21 & CHF Dx)?: No  Mode of Transport at Discharge:  Other (see comment)(daughter in law )  Transition of Care Consult (CM Consult): Discharge Planning  Discharge Durable Medical Equipment: No  Physical Therapy Consult: No  Occupational Therapy Consult: No  Speech Therapy Consult: No  Current Support Network: Lives with Spouse, Own Home, Family Lives Nearby  Confirm Follow Up Transport: Family  The Patient and/or Patient Representative was Provided with a Choice of Provider and Agrees with the Discharge Plan?: Yes  Freedom of Choice List was Provided with Basic Dialogue that Supports the Patient's Individualized Plan of Care/Goals, Treatment Preferences and Shares the Quality Data Associated with the Providers?: No  [place] Resource Information Provided?: No  Discharge Location  Discharge Placement: Home with family assistance    Carmen Marie[address] [place], [facility]  [phone]

## 2021-04-01 NOTE — PROGRESS NOTES
Physician Progress Note      Bharati Carrion  CSN #:                  [id number]  :                       1945  ADMIT DATE:       3/27/2021 6:40 PM  [address] [place] DATE:        3/30/2021 1:28 PM  RESPONDING  PROVIDER #:        Olga Rincon MD          QUERY TEXT:    Dr. Bello Basurto:    Patient admitted with Wheezing, noted to have Acute on chronic kidney disease in Cardiology consult note dated 3/29. If possible, please document in progress notes and discharge summary if you are evaluating and/or treating any of the following: The medical record reflects the following:  Risk Factors: Hx: HTN / PVD / S/p B/L LE Amputations / CHF / PAD  Clinical Indicators: GFR: 48 ^ 39 ^ 40 ^ 55. .. Windy Kuldip Windy Kuldip Bun/Cr: 21/1.30 ^ 24/1.39 ^ 30/1.64 ^ 30/1.36. ... Windy Kuldip Windy Kuldip Noted: elevated cr due to poor perfusion status and use of Bactrim . ... Windy Kuldip Windy Kuldip Noted per Nephro: Likely cardiorenal syndrome. .. Windy Kuldip Windy Kuldip Windy Kuldip Treatment: Monitor closely; Avoid nephrotoxic drugs, Adjust all meds to GFR; On Lasix 40 mg IV diego (home dose 20 mg daily); Nephro consult; Home hct and entresto on hold; Will check urine sediment, Check urine protein:creatinine ratio and urine eos; Options provided:  -- CKD Stage 1 GFR>90  -- CKD Stage 2 GFR 60-90  -- CKD Stage 3a GFR 45-59  -- CKD Stage 3b GFR 30-44  -- CKD Stage 4 GFR 15-29  -- CKD Stage 5 GFR<15  -- ESRD  -- Other - I will add my own diagnosis  -- Disagree - Not applicable / Not valid  -- Disagree - Clinically unable to determine / Unknown  -- Refer to Clinical Documentation Reviewer    PROVIDER RESPONSE TEXT:    This patient has CKD Stage 3a.     Query created by: Thomas Little on 3/31/2021 9:39 AM      Electronically signed by:  Olga Rincon MD 2021 5:51 PM

## 2021-05-16 ENCOUNTER — HOSPITAL ENCOUNTER (INPATIENT)
Age: 76
LOS: 6 days | Discharge: HOME | DRG: 291 | End: 2021-05-22
Attending: EMERGENCY MEDICINE | Admitting: INTERNAL MEDICINE
Payer: MEDICARE

## 2021-05-16 ENCOUNTER — APPOINTMENT (OUTPATIENT)
Dept: GENERAL RADIOLOGY | Age: 76
DRG: 291 | End: 2021-05-16
Attending: EMERGENCY MEDICINE
Payer: MEDICARE

## 2021-05-16 PROBLEM — I50.9 ACUTE EXACERBATION OF CHF (CONGESTIVE HEART FAILURE) (HCC): Status: ACTIVE | Noted: 2021-05-16

## 2021-05-16 LAB
ALBUMIN SERPL-MCNC: 3.4 G/DL (ref 3.5–5)
ALBUMIN/GLOB SERPL: 0.9 {RATIO} (ref 1.1–2.2)
ALP SERPL-CCNC: 78 U/L (ref 45–117)
ALT SERPL-CCNC: 34 U/L (ref 12–78)
ANION GAP SERPL CALC-SCNC: 4 MMOL/L (ref 5–15)
APPEARANCE UR: CLEAR
AST SERPL-CCNC: 40 U/L (ref 15–37)
ATRIAL RATE: 78 BPM
BACTERIA URNS QL MICRO: NEGATIVE /HPF
BASOPHILS # BLD: 0 K/UL (ref 0–0.1)
BASOPHILS NFR BLD: 0 % (ref 0–1)
BILIRUB SERPL-MCNC: 0.7 MG/DL (ref 0.2–1)
BILIRUB UR QL: NEGATIVE
BNP SERPL-MCNC: ABNORMAL PG/ML
BUN SERPL-MCNC: 28 MG/DL (ref 6–20)
BUN/CREAT SERPL: 27 (ref 12–20)
CALCIUM SERPL-MCNC: 8.9 MG/DL (ref 8.5–10.1)
CALCULATED P AXIS, ECG09: 48 DEGREES
CALCULATED R AXIS, ECG10: 82 DEGREES
CALCULATED T AXIS, ECG11: 95 DEGREES
CHLORIDE SERPL-SCNC: 102 MMOL/L (ref 97–108)
CO2 SERPL-SCNC: 31 MMOL/L (ref 21–32)
COLOR UR: ABNORMAL
COMMENT, HOLDF: NORMAL
COVID-19 RAPID TEST, COVR: NOT DETECTED
CREAT SERPL-MCNC: 1.04 MG/DL (ref 0.55–1.02)
DIAGNOSIS, 93000: NORMAL
DIFFERENTIAL METHOD BLD: ABNORMAL
EOSINOPHIL # BLD: 0.1 K/UL (ref 0–0.4)
EOSINOPHIL NFR BLD: 1 % (ref 0–7)
EPITH CASTS URNS QL MICRO: ABNORMAL /LPF
ERYTHROCYTE [DISTWIDTH] IN BLOOD BY AUTOMATED COUNT: 21.9 % (ref 11.5–14.5)
GLOBULIN SER CALC-MCNC: 3.9 G/DL (ref 2–4)
GLUCOSE SERPL-MCNC: 88 MG/DL (ref 65–100)
GLUCOSE UR STRIP.AUTO-MCNC: NEGATIVE MG/DL
HCT VFR BLD AUTO: 37.9 % (ref 35–47)
HGB BLD-MCNC: 12.2 G/DL (ref 11.5–16)
HGB UR QL STRIP: ABNORMAL
HYALINE CASTS URNS QL MICRO: ABNORMAL /LPF (ref 0–5)
IMM GRANULOCYTES # BLD AUTO: 0 K/UL (ref 0–0.04)
IMM GRANULOCYTES NFR BLD AUTO: 0 % (ref 0–0.5)
KETONES UR QL STRIP.AUTO: NEGATIVE MG/DL
LEUKOCYTE ESTERASE UR QL STRIP.AUTO: NEGATIVE
LYMPHOCYTES # BLD: 1.4 K/UL (ref 0.8–3.5)
LYMPHOCYTES NFR BLD: 29 % (ref 12–49)
MCH RBC QN AUTO: 23.3 PG (ref 26–34)
MCHC RBC AUTO-ENTMCNC: 32.2 G/DL (ref 30–36.5)
MCV RBC AUTO: 72.5 FL (ref 80–99)
MONOCYTES # BLD: 0.7 K/UL (ref 0–1)
MONOCYTES NFR BLD: 15 % (ref 5–13)
NEUTS SEG # BLD: 2.7 K/UL (ref 1.8–8)
NEUTS SEG NFR BLD: 55 % (ref 32–75)
NITRITE UR QL STRIP.AUTO: NEGATIVE
NRBC # BLD: 0.02 K/UL (ref 0–0.01)
NRBC BLD-RTO: 0.4 PER 100 WBC
P-R INTERVAL, ECG05: 182 MS
PH UR STRIP: 6.5 [PH] (ref 5–8)
PLATELET # BLD AUTO: 134 K/UL (ref 150–400)
PMV BLD AUTO: ABNORMAL FL (ref 8.9–12.9)
POTASSIUM SERPL-SCNC: 4.3 MMOL/L (ref 3.5–5.1)
PROT SERPL-MCNC: 7.3 G/DL (ref 6.4–8.2)
PROT UR STRIP-MCNC: 100 MG/DL
Q-T INTERVAL, ECG07: 400 MS
QRS DURATION, ECG06: 84 MS
QTC CALCULATION (BEZET), ECG08: 456 MS
RBC # BLD AUTO: 5.23 M/UL (ref 3.8–5.2)
RBC #/AREA URNS HPF: ABNORMAL /HPF (ref 0–5)
RBC MORPH BLD: ABNORMAL
RBC MORPH BLD: ABNORMAL
SAMPLES BEING HELD,HOLD: NORMAL
SODIUM SERPL-SCNC: 137 MMOL/L (ref 136–145)
SOURCE, COVRS: NORMAL
SP GR UR REFRACTOMETRY: 1.01 (ref 1–1.03)
TROPONIN I BLD-MCNC: 0.07 NG/ML (ref 0–0.08)
TROPONIN I BLD-MCNC: 0.07 NG/ML (ref 0–0.08)
TROPONIN I SERPL-MCNC: 0.07 NG/ML
TROPONIN I SERPL-MCNC: 0.08 NG/ML
UA: UC IF INDICATED,UAUC: ABNORMAL
UROBILINOGEN UR QL STRIP.AUTO: 1 EU/DL (ref 0.2–1)
VENTRICULAR RATE, ECG03: 78 BPM
WBC # BLD AUTO: 4.9 K/UL (ref 3.6–11)
WBC URNS QL MICRO: ABNORMAL /HPF (ref 0–4)

## 2021-05-16 PROCEDURE — 81001 URINALYSIS AUTO W/SCOPE: CPT

## 2021-05-16 PROCEDURE — 74011250637 HC RX REV CODE- 250/637: Performed by: STUDENT IN AN ORGANIZED HEALTH CARE EDUCATION/TRAINING PROGRAM

## 2021-05-16 PROCEDURE — 74011250636 HC RX REV CODE- 250/636: Performed by: STUDENT IN AN ORGANIZED HEALTH CARE EDUCATION/TRAINING PROGRAM

## 2021-05-16 PROCEDURE — 85025 COMPLETE CBC W/AUTO DIFF WBC: CPT

## 2021-05-16 PROCEDURE — 93005 ELECTROCARDIOGRAM TRACING: CPT

## 2021-05-16 PROCEDURE — 65660000000 HC RM CCU STEPDOWN

## 2021-05-16 PROCEDURE — 83880 ASSAY OF NATRIURETIC PEPTIDE: CPT

## 2021-05-16 PROCEDURE — 84484 ASSAY OF TROPONIN QUANT: CPT

## 2021-05-16 PROCEDURE — 99285 EMERGENCY DEPT VISIT HI MDM: CPT

## 2021-05-16 PROCEDURE — 74011250636 HC RX REV CODE- 250/636: Performed by: INTERNAL MEDICINE

## 2021-05-16 PROCEDURE — 96374 THER/PROPH/DIAG INJ IV PUSH: CPT

## 2021-05-16 PROCEDURE — 71045 X-RAY EXAM CHEST 1 VIEW: CPT

## 2021-05-16 PROCEDURE — 36415 COLL VENOUS BLD VENIPUNCTURE: CPT

## 2021-05-16 PROCEDURE — 80053 COMPREHEN METABOLIC PANEL: CPT

## 2021-05-16 PROCEDURE — 74011250637 HC RX REV CODE- 250/637: Performed by: INTERNAL MEDICINE

## 2021-05-16 PROCEDURE — 87635 SARS-COV-2 COVID-19 AMP PRB: CPT

## 2021-05-16 RX ORDER — SODIUM CHLORIDE 0.9 % (FLUSH) 0.9 %
5-40 SYRINGE (ML) INJECTION EVERY 8 HOURS
Status: DISCONTINUED | OUTPATIENT
Start: 2021-05-16 | End: 2021-05-22 | Stop reason: HOSPADM

## 2021-05-16 RX ORDER — POTASSIUM CHLORIDE 750 MG/1
10 TABLET, FILM COATED, EXTENDED RELEASE ORAL DAILY
Status: DISCONTINUED | OUTPATIENT
Start: 2021-05-16 | End: 2021-05-22 | Stop reason: HOSPADM

## 2021-05-16 RX ORDER — ASPIRIN 325 MG
325 TABLET ORAL
Status: COMPLETED | OUTPATIENT
Start: 2021-05-16 | End: 2021-05-16

## 2021-05-16 RX ORDER — SACUBITRIL AND VALSARTAN 24; 26 MG/1; MG/1
1 TABLET, FILM COATED ORAL 2 TIMES DAILY
COMMUNITY
End: 2021-05-22

## 2021-05-16 RX ORDER — POLYETHYLENE GLYCOL 3350 17 G/17G
17 POWDER, FOR SOLUTION ORAL DAILY PRN
Status: DISCONTINUED | OUTPATIENT
Start: 2021-05-16 | End: 2021-05-22 | Stop reason: HOSPADM

## 2021-05-16 RX ORDER — FUROSEMIDE 10 MG/ML
40 INJECTION INTRAMUSCULAR; INTRAVENOUS 2 TIMES DAILY
Status: DISCONTINUED | OUTPATIENT
Start: 2021-05-16 | End: 2021-05-19

## 2021-05-16 RX ORDER — ENOXAPARIN SODIUM 100 MG/ML
60 INJECTION SUBCUTANEOUS ONCE
Status: DISCONTINUED | OUTPATIENT
Start: 2021-05-16 | End: 2021-05-16

## 2021-05-16 RX ORDER — CLOPIDOGREL BISULFATE 75 MG/1
75 TABLET ORAL DAILY
Status: DISCONTINUED | OUTPATIENT
Start: 2021-05-16 | End: 2021-05-22 | Stop reason: HOSPADM

## 2021-05-16 RX ORDER — CARVEDILOL 12.5 MG/1
12.5 TABLET ORAL 2 TIMES DAILY WITH MEALS
Status: DISCONTINUED | OUTPATIENT
Start: 2021-05-16 | End: 2021-05-22 | Stop reason: HOSPADM

## 2021-05-16 RX ORDER — ASPIRIN 81 MG/1
81 TABLET ORAL DAILY
Status: DISCONTINUED | OUTPATIENT
Start: 2021-05-16 | End: 2021-05-22 | Stop reason: HOSPADM

## 2021-05-16 RX ORDER — ENOXAPARIN SODIUM 100 MG/ML
40 INJECTION SUBCUTANEOUS DAILY
Status: DISCONTINUED | OUTPATIENT
Start: 2021-05-17 | End: 2021-05-22 | Stop reason: HOSPADM

## 2021-05-16 RX ORDER — SODIUM CHLORIDE 0.9 % (FLUSH) 0.9 %
5-40 SYRINGE (ML) INJECTION AS NEEDED
Status: DISCONTINUED | OUTPATIENT
Start: 2021-05-16 | End: 2021-05-22 | Stop reason: HOSPADM

## 2021-05-16 RX ORDER — ATORVASTATIN CALCIUM 40 MG/1
40 TABLET, FILM COATED ORAL DAILY
Status: DISCONTINUED | OUTPATIENT
Start: 2021-05-16 | End: 2021-05-22 | Stop reason: HOSPADM

## 2021-05-16 RX ORDER — FUROSEMIDE 10 MG/ML
40 INJECTION INTRAMUSCULAR; INTRAVENOUS ONCE
Status: COMPLETED | OUTPATIENT
Start: 2021-05-16 | End: 2021-05-16

## 2021-05-16 RX ORDER — ONDANSETRON 2 MG/ML
4 INJECTION INTRAMUSCULAR; INTRAVENOUS
Status: DISCONTINUED | OUTPATIENT
Start: 2021-05-16 | End: 2021-05-22 | Stop reason: HOSPADM

## 2021-05-16 RX ORDER — ACETAMINOPHEN 325 MG/1
650 TABLET ORAL
Status: DISCONTINUED | OUTPATIENT
Start: 2021-05-16 | End: 2021-05-22 | Stop reason: HOSPADM

## 2021-05-16 RX ORDER — ACETAMINOPHEN 650 MG/1
650 SUPPOSITORY RECTAL
Status: DISCONTINUED | OUTPATIENT
Start: 2021-05-16 | End: 2021-05-22 | Stop reason: HOSPADM

## 2021-05-16 RX ORDER — PROMETHAZINE HYDROCHLORIDE 25 MG/1
12.5 TABLET ORAL
Status: DISCONTINUED | OUTPATIENT
Start: 2021-05-16 | End: 2021-05-22 | Stop reason: HOSPADM

## 2021-05-16 RX ADMIN — CARVEDILOL 12.5 MG: 12.5 TABLET, FILM COATED ORAL at 09:15

## 2021-05-16 RX ADMIN — CARVEDILOL 12.5 MG: 12.5 TABLET, FILM COATED ORAL at 16:47

## 2021-05-16 RX ADMIN — SACUBITRIL AND VALSARTAN 1 TABLET: 24; 26 TABLET, FILM COATED ORAL at 10:57

## 2021-05-16 RX ADMIN — FUROSEMIDE 40 MG: 10 INJECTION, SOLUTION INTRAMUSCULAR; INTRAVENOUS at 06:04

## 2021-05-16 RX ADMIN — Medication 10 ML: at 22:00

## 2021-05-16 RX ADMIN — SACUBITRIL AND VALSARTAN 1 TABLET: 24; 26 TABLET, FILM COATED ORAL at 17:00

## 2021-05-16 RX ADMIN — ASPIRIN 81 MG: 81 TABLET, COATED ORAL at 09:14

## 2021-05-16 RX ADMIN — ASPIRIN 325 MG ORAL TABLET 325 MG: 325 PILL ORAL at 06:04

## 2021-05-16 RX ADMIN — CLOPIDOGREL BISULFATE 75 MG: 75 TABLET ORAL at 09:15

## 2021-05-16 RX ADMIN — ATORVASTATIN CALCIUM 40 MG: 40 TABLET, FILM COATED ORAL at 09:15

## 2021-05-16 RX ADMIN — POTASSIUM CHLORIDE 10 MEQ: 750 TABLET, FILM COATED, EXTENDED RELEASE ORAL at 09:15

## 2021-05-16 RX ADMIN — FUROSEMIDE 40 MG: 10 INJECTION, SOLUTION INTRAMUSCULAR; INTRAVENOUS at 16:47

## 2021-05-16 RX ADMIN — Medication 10 ML: at 10:58

## 2021-05-16 NOTE — H&P
Hospitalist Admission Note    NAME: Chuyita Palacios   :  1945   MRN:  [id number]     Date/Time:  2021 8:57 AM    Patient PCP: Jordan Mercedes MD  ________________________________________________________________________    My assessment of this patient's clinical condition and my plan of care is as follows. Assessment / Plan:  Acute systolic congestive heart failure exacerbation  -BNP is elevated more than 35,000. Chest x-ray shows no acute process. On exam she does have crackles  -Start Lasix 40 mg IV twice daily. Continue home Coreg and Entresto as creatinine is normal  -Strict I's and O's, daily weights and low-salt diet    Mild troponin elevation may be demand ischemia  -First troponin is elevated 0.07. Check 2 more sets of troponins. Consider cardiology evaluation. Keep n.p.o. from midnight. History of coronary artery disease s/p PCI  History of peripheral arterial disease s/p bilateral lower extremity amputations  COPD not in exacerbation  -Continue home aspirin, atorvastatin, Coreg, Plavix      I spoke to both patient's son and also daughter-in-law and updated on plan of care    Code Status: Full code  Surrogate Decision Maker:  Jonny    DVT Prophylaxis: Lovenox  GI Prophylaxis: not indicated    Baseline: From home, lives with daughter-in-law, has bilateral lower extremity amputations        Subjective:   CHIEF COMPLAINT: Shortness of breath, generalized weakness    HISTORY OF PRESENT ILLNESS:     Sima Clark is a 68 y.o. female who presents with past medical history of congestive heart failure, COPD, coronary artery disease, peripheral arterial disease coming the hospital chief complaints of shortness of breath, generalized weakness. Patient reports that being revealed significantly about 3 days ago when she felt weakness involving all her extremities. She also reported some exertional shortness of breath with mild cough and wheezing.   Does not report any fever or chills. Does not report any chest pain. Denies abdominal pain, nausea or vomiting. She reports being compliant with her medications. On arrival to ED, vital signs are within normal limits except for slightly elevated blood pressure of 160/89. On labs CBC was normal.  BMP showed a creatinine of 1.04. LFTs were normal.  proBNP was 35,000. Chest x-ray shows no acute changes. We were asked to admit for work up and evaluation of the above problems. Past Medical History:   Diagnosis Date    Arthritis     was in legs    Hypertension     Other ill-defined conditions(799.89)     extremety vascular disease    [facility] [address]     PVD (peripheral vascular disease) ([facility] [address]         Past Surgical History:   Procedure Laterality Date    COLONOSCOPY N/A 2018    COLONOSCOPY performed by Ирина Michaels [facility] at [facility]COPY    HX AMPUTATION      Right Above Knee Amputation    HX AMPUTATION  2011    Left Below Knee Amputation    HX ORTHOPAEDIC  12     Debridement Left BKA Stump and Placement of Wound [facility]    HX OTHER SURGICAL  10/24/12    SPLIT THICKNESS SKIN GRAFT FROM RIGHT THIGH TO BELOW KNEE AMPUTATION STUMP (to wound left BKA stump    [place] VEIN BYPASS GRAFT,FEM-TIBIAL  3/04/2011    Left femoral post. tibial bypass with vein       Social History     Tobacco Use    Smoking status: Former Smoker     Quit date: 2008     Years since quittin.8    Smokeless tobacco: Never Used   Substance Use Topics    Alcohol use: No        Family History   Problem Relation Age of Onset    Hypertension Mother     Hypertension Father     Diabetes Sister     Diabetes Brother      Allergies   Allergen Reactions    Percocet [Oxycodone-Acetaminophen] Nausea and Vomiting        Prior to Admission medications    Medication Sig Start Date End Date Taking? Authorizing Provider   sacubitriL-valsartan Rheba Ravi) 24-26 mg tablet Take 1 Tab by mouth two (2) times a day.    Yes Stephani Staples MD   furosemide (LASIX) 40 mg tablet Take 1 Tab by mouth daily. 3/30/21  Yes Shanda Melendez MD   potassium chloride (K-DUR, KLOR-CON) 20 mEq tablet Take 0.5 Tabs by mouth daily. 3/30/21  Yes Shanda Melendez MD   carvediloL (Coreg) 12.5 mg tablet Take 12.5 mg by mouth two (2) times daily (with meals). Yes Provider, Historical   aspirin delayed-release 81 mg tablet Take  by mouth daily. Yes Stephani Staples MD   clopidogreL (PLAVIX) 75 mg tab Take 1 Tab by mouth daily. 5/11/20  Yes Srikanth Brown MD   gabapentin (NEURONTIN) 300 mg capsule Take 300 mg by mouth as needed for Pain. Provider, Historical   atorvastatin (Lipitor) 40 mg tablet Take 40 mg by mouth daily. Stephani Staples MD       REVIEW OF SYSTEMS:     I am not able to complete the review of systems because:    The patient is intubated and sedated    The patient has altered mental status due to his acute medical problems    The patient has baseline aphasia from prior stroke(s)    The patient has baseline dementia and is not reliable historian    The patient is in acute medical distress and unable to provide information           Total of 12 systems reviewed as follows:       POSITIVE= underlined text  Negative = text not underlined  General:  fever, chills, sweats, generalized weakness, weight loss/gain,      loss of appetite   Eyes:    blurred vision, eye pain, loss of vision, double vision  ENT:    rhinorrhea, pharyngitis   Respiratory:   cough, sputum production, SOB, BRINK, wheezing, pleuritic pain   Cardiology:   chest pain, palpitations, orthopnea, PND, edema, syncope   Gastrointestinal:  abdominal pain , N/V, diarrhea, dysphagia, constipation, bleeding   Genitourinary:  frequency, urgency, dysuria, hematuria, incontinence   Muskuloskeletal :  arthralgia, myalgia, back pain  Hematology:  easy bruising, nose or gum bleeding, lymphadenopathy   Dermatological: rash, ulceration, pruritis, color change / jaundice  Endocrine:   hot flashes or polydipsia   Neurological:  headache, dizziness, confusion, focal weakness, paresthesia,     Speech difficulties, memory loss, gait difficulty  Psychological: Feelings of anxiety, depression, agitation    Objective:   VITALS:    Visit Vitals  BP (!) 164/89 (BP 1 Location: Right arm)   Pulse 64   Temp 97.9 °F (36.6 °C)   Resp 23   SpO2 90%       PHYSICAL EXAM:    General:    Alert, cooperative, no distress, appears stated age. HEENT: Atraumatic, anicteric sclerae, pink conjunctivae     No oral ulcers, mucosa moist  Neck:  Supple, symmetrical,  thyroid: non tender  Lungs:   Bilateral air entry present, crackles are present at both bases, no wheezing  Chest wall:  No tenderness  No Accessory muscle use. Heart:   Regular  rhythm,  No  murmur   No edema  Abdomen:   Soft, non-tender. Not distended. Bowel sounds normal  Extremities: No cyanosis. No clubbing,      Bilateral lower extremity amputations  Skin:     Not pale. Not Jaundiced  No rashes   Psych:  Not anxious or agitated. Neurologic: EOMs intact. No facial asymmetry. No aphasia or slurred speech. Symmetrical strength, Sensation grossly intact.  Alert and oriented X 4.     _______________________________________________________________________  Care Plan discussed with:    Comments   Patient y    Family  y  daughter-in-law   RN y    Care Manager                    Consultant:      _______________________________________________________________________  Expected  Disposition:   Home with Family y   HH/PT/OT/RN    SNF/LTC    [place]    ________________________________________________________________________  TOTAL TIME:  61 Minutes    Critical Care Provided     Minutes non procedure based      Comments    y Reviewed previous records   >50% of visit spent in counseling and coordination of care y Discussion with patient and/or family and questions answered       ________________________________________________________________________  Signed: Michelle Tabor Savi Boston MD    Procedures: see electronic medical records for all procedures/Xrays and details which were not copied into this note but were reviewed prior to creation of Plan. LAB DATA REVIEWED:    Recent Results (from the past 24 hour(s))   EKG, 12 LEAD, INITIAL    Collection Time: 05/16/21  4:01 AM   Result Value Ref Range    Ventricular Rate 78 BPM    Atrial Rate 78 BPM    P-R Interval 182 ms    QRS Duration 84 ms    Q-T Interval 400 ms    QTC Calculation (Bezet) 456 ms    Calculated P Axis 48 degrees    Calculated R Axis 82 degrees    Calculated T Axis 95 degrees    Diagnosis       Normal sinus rhythm  Anterior infarct (cited on or before 08-MAY-2020)  When compared with ECG of 27-MAR-2021 18:51,  Nonspecific T wave abnormality no longer evident in Inferior leads  Nonspecific T wave abnormality, worse in Lateral leads  QT has lengthened     CBC WITH AUTOMATED DIFF    Collection Time: 05/16/21  4:30 AM   Result Value Ref Range    WBC 4.9 3.6 - 11.0 K/uL    RBC 5.23 (H) 3.80 - 5.20 M/uL    HGB 12.2 11.5 - 16.0 g/dL    HCT 37.9 35.0 - 47.0 %    MCV 72.5 (L) 80.0 - 99.0 FL    MCH 23.3 (L) 26.0 - 34.0 PG    MCHC 32.2 30.0 - 36.5 g/dL    RDW 21.9 (H) 11.5 - 14.5 %    PLATELET 462 (L) 960 - 400 K/uL    MPV ABNORMAL 8.9 - 12.9 FL    NRBC 0.4 (H) 0  WBC    ABSOLUTE NRBC 0.02 (H) 0.00 - 0.01 K/uL    NEUTROPHILS 55 32 - 75 %    LYMPHOCYTES 29 12 - 49 %    MONOCYTES 15 (H) 5 - 13 %    EOSINOPHILS 1 0 - 7 %    BASOPHILS 0 0 - 1 %    IMMATURE GRANULOCYTES 0 0.0 - 0.5 %    ABS. NEUTROPHILS 2.7 1.8 - 8.0 K/UL    ABS. LYMPHOCYTES 1.4 0.8 - 3.5 K/UL    ABS. MONOCYTES 0.7 0.0 - 1.0 K/UL    ABS. EOSINOPHILS 0.1 0.0 - 0.4 K/UL    ABS. BASOPHILS 0.0 0.0 - 0.1 K/UL    ABS. IMM.  GRANS. 0.0 0.00 - 0.04 K/UL    DF AUTOMATED      RBC COMMENTS ANISOCYTOSIS  2+        RBC COMMENTS TARGET CELLS  PRESENT       METABOLIC PANEL, COMPREHENSIVE    Collection Time: 05/16/21  4:30 AM   Result Value Ref Range    Sodium 137 136 - 145 mmol/L Potassium 4.3 3.5 - 5.1 mmol/L    Chloride 102 97 - 108 mmol/L    CO2 31 21 - 32 mmol/L    Anion gap 4 (L) 5 - 15 mmol/L    Glucose 88 65 - 100 mg/dL    BUN 28 (H) 6 - 20 MG/DL    Creatinine 1.04 (H) 0.55 - 1.02 MG/DL    BUN/Creatinine ratio 27 (H) 12 - 20      GFR est AA >60 >60 ml/min/1.73m2    GFR est non-AA 52 (L) >60 ml/min/1.73m2    Calcium 8.9 8.5 - 10.1 MG/DL    Bilirubin, total 0.7 0.2 - 1.0 MG/DL    ALT (SGPT) 34 12 - 78 U/L    AST (SGOT) 40 (H) 15 - 37 U/L    Alk. phosphatase 78 45 - 117 U/L    Protein, total 7.3 6.4 - 8.2 g/dL    Albumin 3.4 (L) 3.5 - 5.0 g/dL    Globulin 3.9 2.0 - 4.0 g/dL    A-G Ratio 0.9 (L) 1.1 - 2.2     NT-PRO BNP    Collection Time: 05/16/21  4:30 AM   Result Value Ref Range    NT pro-BNP >35,000 (H) <450 PG/ML   SAMPLES BEING HELD    Collection Time: 05/16/21  4:30 AM   Result Value Ref Range    SAMPLES BEING HELD  1 RD, 1 LAV     COMMENT        Add-on orders for these samples will be processed based on acceptable specimen integrity and analyte stability, which may vary by analyte.    POC TROPONIN-I    Collection Time: 05/16/21  4:41 AM   Result Value Ref Range    Troponin-I (POC) 0.07 0.00 - 0.08 ng/mL   COVID-19 RAPID TEST    Collection Time: 05/16/21  4:50 AM   Result Value Ref Range    Specimen source Please find results under separate order      COVID-19 rapid test Not detected NOTD     POC TROPONIN-I    Collection Time: 05/16/21  6:47 AM   Result Value Ref Range    Troponin-I (POC) 0.07 0.00 - 0.08 ng/mL

## 2021-05-16 NOTE — PROGRESS NOTES
End of Shift Note    Bedside shift change report given to Massiel FAULKNER (oncoming nurse) by Jewel Dnag RN (offgoing nurse). Report included the following information SBAR, Kardex, Intake/Output, MAR and Recent Results    Shift worked:  [id number]       Shift summary and any significant changes:     VS stable. troponins 0.8 and 0.7 for my shift     Concerns for physician to address:       Zone phone for oncoming shift:   [phone]       Activity:  Activity Level: Up with Assistance  Number times ambulated in hallways past shift: 0  Number of times OOB to chair past shift: 0    Cardiac:   Cardiac Monitoring: Yes           Access:   Current line(s): PIV     Genitourinary:   Urinary status: voiding    Respiratory:   O2 Device: None (Room air)  Chronic home O2 use?: NO  Incentive spirometer at bedside: YES     GI:  Last Bowel Movement Date: 05/15/21  Current diet:  DIET CARDIAC Regular  DIET NPO  Passing flatus: YES  Tolerating current diet: YES       Pain Management:   Patient states pain is manageable on current regimen: YES    Skin:  Samy Score: 19  Interventions: float heels, increase time out of bed, foam dressing and PT/OT consult    Patient Safety:  Fall Score:  Total Score: 2  Interventions:        Length of Stay:  Expected LOS: - - -  Actual LOS: 0      Aleyda Kaur RN

## 2021-05-16 NOTE — ED PROVIDER NOTES
EMERGENCY DEPARTMENT HISTORY AND PHYSICAL EXAM    Please note that this dictation was completed with [employer], the computer voice recognition software. Quite often unanticipated grammatical, syntax, homophones, and other interpretive errors are inadvertently transcribed by the computer software. Please disregard these errors. Please excuse any errors that have escaped final proofreading. Date: 5/16/2021  Patient Name: Magali Bradley  Patient Age and Sex: 68 y.o. female    History of Presenting Illness     Cc: generalized malaise, sob    History Provided By: Patient, daughter    HPI: Magali Bradley, is a 68 y.o. female with a past medical history of CAD status post multiple stents, hypertension, peripheral artery disease status post bilateral lower extremity amputations who presents for evaluation of several days of general malaise. Patient's daughter thinks that she may be having a CHF exacerbation, she was here recently for similar issues. Reports compliance with all her medications including 40 mg Lasix daily. Endorses some wheezing, mild shortness of breath but no fevers, cough, chest pain, headache, vision changes nausea, vomiting or abdominal pain. Denies any constipation, diarrhea, dysuria or any other symptoms at this time      Pt denies any other alleviating or exacerbating factors. No other associated signs or symptoms. There are no other complaints, changes or physical findings at this time.      PCP: Robin Wolf MD    Past History   All documented elements of the [facility] reviewed and verified by me. -Kevin Mays MD    Past Medical History:  Past Medical History:   Diagnosis Date    Arthritis     was in legs    Hypertension     Other ill-defined conditions(799.89)     extremety vascular disease    PAD (peripheral artery disease) ([address]     PVD (peripheral vascular disease) ([address]        Past Surgical History:  Past Surgical History:   Procedure Laterality Date    COLONOSCOPY N/A 11/7/2018 COL[facility] performed [facility] Bennett Person [facility] at [facility] ENDOSCOPY    HX AMPUTATION  2007    Right Above Knee Amputation    HX AMPUTATION  2011    Left Below Knee Amputation    HX ORTHOPAEDIC  12     Debridement Left BKA Stump and Placement of Wound [facility]    HX OTHER SURGICAL  10/24/12    SPLIT THICKNESS SKIN GRAFT FROM RIGHT THIGH TO BELOW KNEE AMPUTATION STUMP (to wound left BKA stump    [place] VEIN BYPASS GRAFT,FEM-TIBIAL  3/04/2011    Left femoral post. tibial bypass with vein       Family History:  Family History   Problem Relation Age of Onset    Hypertension Mother     Hypertension Father     Diabetes Sister     Diabetes Brother        Social History:  Social History     Tobacco Use    Smoking status: Former Smoker     Quit date: 2008     Years since quittin.8    Smokeless tobacco: Never Used   Substance Use Topics    Alcohol use: No    Drug use: No       Allergies: Allergies   Allergen Reactions    Percocet [Oxycodone-Acetaminophen] Nausea and Vomiting       Review of Systems   All other systems reviewed and negative    Review of Systems   Constitutional: Positive for fatigue. HENT: Negative for congestion, sinus pain and sore throat. Eyes: Negative for pain and visual disturbance. Respiratory: Positive for shortness of breath and wheezing. Negative for chest tightness. Cardiovascular: Negative for chest pain. Gastrointestinal: Negative for abdominal pain, blood in stool, diarrhea, nausea and vomiting. Endocrine: Negative for polydipsia and polyuria. Genitourinary: Negative for dysuria and flank pain. Musculoskeletal: Negative for myalgias and neck pain. Skin: Negative for color change and rash. Neurological: Negative for dizziness and headaches. Psychiatric/Behavioral: Negative for agitation and suicidal ideas. All other systems reviewed and are negative.       Physical Exam   Reviewed patients vital signs and nursing note    Physical Exam  Vitals and nursing note reviewed. Constitutional:       Appearance: She is not diaphoretic. HENT:      Head: Normocephalic and atraumatic. Nose: No congestion or rhinorrhea. Mouth/Throat:      Mouth: Mucous membranes are moist.   Eyes:      General: No scleral icterus. Extraocular Movements: Extraocular movements intact. Conjunctiva/sclera: Conjunctivae normal.   Cardiovascular:      Rate and Rhythm: Normal rate and regular rhythm. Pulses: Normal pulses. Heart sounds: Normal heart sounds. Pulmonary:      Effort: Pulmonary effort is normal.      Breath sounds: Decreased breath sounds and wheezing present. Abdominal:      General: There is no distension. Palpations: Abdomen is soft. Tenderness: There is no abdominal tenderness. There is no guarding or rebound. Musculoskeletal:         General: No tenderness. Normal range of motion. Cervical back: Normal range of motion and neck supple. Skin:     General: Skin is warm and dry. Capillary Refill: Capillary refill takes 2 to 3 seconds. Comments: Right AKA, left BKA   Neurological:      General: No focal deficit present. Mental Status: She is alert and oriented to person, place, and time. Psychiatric:         Mood and Affect: Mood normal.         Behavior: Behavior normal.         Diagnostic Study Results     Labs - I have personally reviewed and interpreted all laboratory results.  Abad Queen MD, MSc  Recent Results (from the past 24 hour(s))   EKG, 12 LEAD, INITIAL    Collection Time: 05/16/21  4:01 AM   Result Value Ref Range    Ventricular Rate 78 BPM    Atrial Rate 78 BPM    P-R Interval 182 ms    QRS Duration 84 ms    Q-T Interval 400 ms    QTC Calculation (Bezet) 456 ms    Calculated P Axis 48 degrees    Calculated R Axis 82 degrees    Calculated T Axis 95 degrees    Diagnosis       Normal sinus rhythm  Anterior infarct (cited on or before 08-MAY-2020)  When compared with ECG of 27-MAR-2021 18:51,  Nonspecific T wave abnormality no longer evident in Inferior leads  Nonspecific T wave abnormality, worse in Lateral leads  QT has lengthened         Radiologic Studies - I have personally reviewed and interpreted all imaging studies and agree with radiology interpretation and report. - Eloisa Swift MD, MSc  No orders to display         Medical Decision Making   I am the first provider for this patient. Records Reviewed: I reviewed our electronic medical record system for any past medical records that were available that may contribute to the patient's current condition, including their PMH, surgical history, social and family history. Reviewed the nursing notes and vital signs from today's visit. Nursing notes will be reviewed as they become available in realtime while the pt has been in the ED. In addition, I read most recent discharge summaries, if available and reviewed prior ECGs or imaging studies for comparison purposes. Eloisa Swift MD Msc    Vital Signs-Reviewed the patient's vital signs. Patient Vitals for the past 24 hrs:   Temp Pulse Resp BP SpO2   05/16/21 0357 97.4 °F (36.3 °C) 69 16 (!) 160/89 94 %       ECG interpretation: Normal sinus rhythm with rate 78, normal intervals, no ST elevations, depressions or other changes concerning for acute ischemia. This ECG has been viewed and interpreted by me personally. Eloisa Swift MD, Msc    Provider Notes (Medical Decision Making):   70-year-old female with PMH of HTN, CAD s/p stents, PAD s/p bilateral lower extremity amputations who presents for evaluation of shortness of breath, general malaise. On examination patient is in no acute distress, nontoxic appearing. Has mild wheezing and slightly diminished lung sounds bilaterally, no respiratory distress, normal peripheral pulses, soft nontender abdomen. EKG is normal sinus, without evidence of ischemia, prolonged QT. Vitals are stable on room air.   Will obtain basic labs, chest x-ray, troponin, BNP      ED Course: Initial assessment performed. The patients presenting problems have been discussed, and they are in agreement with the care plan formulated and outlined with them. I have encouraged them to ask questions as they arise throughout their visit.    5:19AM  - troponin mildly elevated without elevation in creatinine. ECG shows no acute ischemia. This may be demand vs ischemia. Will send second troponin for trending purposes. - bnp extremely high and c/w acute on chronic chf exacerbation. No respirator distress or hypoxemia. Will start IV diuresis, follow urine output closely. - patient is a very high risk for potential decompensation, will need to be hospitalized for further mgmt. Admit to tele. David Hinds MD      Consult Note:  Bk Neil MD spoke with  hospitalist,   Discussed pt's hx, physical exam and available diagnostic and imaging results. Reviewed care plans. Agree with management and plan thus far. DISPOSITION: ADMIT  Patient is being admitted to the hospital.  Their test results and reasons for admission have been discussed. The patient and/or available family express agreement with and understanding of the need to be admitted and their admission diagnosis. Thank you for resuming the care of this patient. Please don't hesitate to contact me in the emergency department if you  have any additional questions. Bk Neil MD, MSc        Keena Luna MD, am the attending of record for this patient encounter. Diagnosis     Clinical Impression:   Acute on chronic chf exacerbation  Elevated troponin  CAD  COPD with mild exacerbation    Attestation:  I personally performed the services described in this documentation on this date 5/16/2021 for patient Herrera Ronquillo.   Bk Neil MD

## 2021-05-16 NOTE — ED NOTES
TRANSFER - OUT REPORT:    Verbal report given to Tyrell Alfaro (name) on Sirena Austin  being transferred to ortho(unit) for routine progression of care       Report consisted of patients Situation, Background, Assessment and   Recommendations(SBAR). Information from the following report(s) SBAR, Kardex and ED Summary was reviewed with the receiving nurse. Lines:   Peripheral IV 05/16/21 Right Antecubital (Active)   Site Assessment Clean, dry, & intact 05/16/21 0434   Phlebitis Assessment 0 05/16/21 0434   Infiltration Assessment 0 05/16/21 0434   Dressing Status Clean, dry, & intact 05/16/21 0434   Dressing Type 4 X 4;Tape;Transparent 05/16/21 0434        Opportunity for questions and clarification was provided.       Patient transported with:   [employer]

## 2021-05-16 NOTE — ACP (ADVANCE CARE PLANNING)
Advance Care Planning Note      NAME: Roque Cornelius   :  1945   MRN:  [id number]     Date/Time:  2021 9:13 AM    Active Diagnoses:  Hospital Problems  Date Reviewed: 2018          Codes Class Noted POA    Acute exacerbation of CHF (congestive heart failure) (HCC) ICD-10-CM: I50.9  ICD-9-CM: 428.0  2021 Unknown          COPD  Coronary artery disease s/p PCI  Peripheral arterial disease  Troponin elevation  Bilateral lower extremity amputations    These active diagnoses are of sufficient risk that focused discussion on advance care planning is indicated in order to allow the patient to thoughtfully consider personal goals of care, and if situations arise that prevent the ability to personally give input, to ensure appropriate representation of their personal desires for different levels and aggressiveness of care. Discussion:   Code status addressed and wants to be a full code. Patient wants central line and vasopressors if needed. Patient  would like to assign  Aida Mabry as the surrogate decision maker. Persons present and participating in discussion: Chavez Rivera MD,  Aida Mabry. Time Spent:   Total time spent face-to-face in education and discussion:  16  minutes.          Yasmine Stephenson MD   Hospitalist

## 2021-05-16 NOTE — PROGRESS NOTES
Problem: Falls - Risk of  Goal: *Absence of Falls  Description: Document Gal Shelton Fall Risk and appropriate interventions in the flowsheet.   Outcome: Progressing Towards Goal  Note: Fall Risk Interventions:  Mobility Interventions: Assess mobility with egress test, Communicate number of staff needed for ambulation/transfer, OT consult for ADLs, Patient to call before getting OOB, PT Consult for mobility concerns, Strengthening exercises (ROM-active/passive), Utilize walker, cane, or other assistive device, Utilize gait belt for transfers/ambulation, PT Consult for assist device competence              Elimination Interventions: Call light in reach, Elevated toilet seat, Toilet paper/wipes in reach, Toileting schedule/hourly rounds, Stay With Me (per policy), Patient to call for help with toileting needs              Problem: Patient Education: Go to Patient Education Activity  Goal: Patient/Family Education  Outcome: Progressing Towards Goal

## 2021-05-16 NOTE — ED NOTES
Jaja Peterson MD at bedside for eval;;    0430 - Connor Napoles MD at bedside for eval;;    0440 - Portable XR at bedside for imaging;;    0612 - Samanta BIANCHI at bedside for reeval;;    0613 - placed pure wick in place to aide in urinary elimination;;     0712 - Bedside shift change report given to Gustavo RN (oncoming nurse) by Chandrika Rausch (offgoing nurse). Report included the following information SBAR, Kardex, ED Summary, Intake/Output and MAR. Ashleigh Profit

## 2021-05-17 LAB
ANION GAP SERPL CALC-SCNC: 5 MMOL/L (ref 5–15)
BUN SERPL-MCNC: 30 MG/DL (ref 6–20)
BUN/CREAT SERPL: 25 (ref 12–20)
CALCIUM SERPL-MCNC: 9.2 MG/DL (ref 8.5–10.1)
CHLORIDE SERPL-SCNC: 101 MMOL/L (ref 97–108)
CO2 SERPL-SCNC: 31 MMOL/L (ref 21–32)
CREAT SERPL-MCNC: 1.19 MG/DL (ref 0.55–1.02)
ERYTHROCYTE [DISTWIDTH] IN BLOOD BY AUTOMATED COUNT: 21.9 % (ref 11.5–14.5)
GLUCOSE SERPL-MCNC: 87 MG/DL (ref 65–100)
HCT VFR BLD AUTO: 34.8 % (ref 35–47)
HGB BLD-MCNC: 11.3 G/DL (ref 11.5–16)
MCH RBC QN AUTO: 23.5 PG (ref 26–34)
MCHC RBC AUTO-ENTMCNC: 32.5 G/DL (ref 30–36.5)
MCV RBC AUTO: 72.5 FL (ref 80–99)
NRBC # BLD: 0 K/UL (ref 0–0.01)
NRBC BLD-RTO: 0 PER 100 WBC
PLATELET # BLD AUTO: 134 K/UL (ref 150–400)
PMV BLD AUTO: ABNORMAL FL (ref 8.9–12.9)
POTASSIUM SERPL-SCNC: 4 MMOL/L (ref 3.5–5.1)
PROCALCITONIN SERPL-MCNC: <0.05 NG/ML
RBC # BLD AUTO: 4.8 M/UL (ref 3.8–5.2)
SODIUM SERPL-SCNC: 137 MMOL/L (ref 136–145)
WBC # BLD AUTO: 5.3 K/UL (ref 3.6–11)

## 2021-05-17 PROCEDURE — 74011250637 HC RX REV CODE- 250/637: Performed by: INTERNAL MEDICINE

## 2021-05-17 PROCEDURE — 65660000000 HC RM CCU STEPDOWN

## 2021-05-17 PROCEDURE — 51798 US URINE CAPACITY MEASURE: CPT

## 2021-05-17 PROCEDURE — 84145 PROCALCITONIN (PCT): CPT

## 2021-05-17 PROCEDURE — 36415 COLL VENOUS BLD VENIPUNCTURE: CPT

## 2021-05-17 PROCEDURE — 80048 BASIC METABOLIC PNL TOTAL CA: CPT

## 2021-05-17 PROCEDURE — 77030038269 HC DRN EXT URIN PURWCK BARD -A

## 2021-05-17 PROCEDURE — 74011250636 HC RX REV CODE- 250/636: Performed by: INTERNAL MEDICINE

## 2021-05-17 PROCEDURE — 85027 COMPLETE CBC AUTOMATED: CPT

## 2021-05-17 RX ORDER — HYDRALAZINE HYDROCHLORIDE 20 MG/ML
10 INJECTION INTRAMUSCULAR; INTRAVENOUS
Status: DISCONTINUED | OUTPATIENT
Start: 2021-05-17 | End: 2021-05-22 | Stop reason: HOSPADM

## 2021-05-17 RX ORDER — DOXYCYCLINE HYCLATE 100 MG
100 TABLET ORAL EVERY 12 HOURS
Status: COMPLETED | OUTPATIENT
Start: 2021-05-17 | End: 2021-05-21

## 2021-05-17 RX ORDER — GUAIFENESIN 600 MG/1
600 TABLET, EXTENDED RELEASE ORAL EVERY 12 HOURS
Status: DISCONTINUED | OUTPATIENT
Start: 2021-05-17 | End: 2021-05-22 | Stop reason: HOSPADM

## 2021-05-17 RX ADMIN — DOXYCYCLINE HYCLATE 100 MG: 100 TABLET, COATED ORAL at 21:49

## 2021-05-17 RX ADMIN — CARVEDILOL 12.5 MG: 12.5 TABLET, FILM COATED ORAL at 10:04

## 2021-05-17 RX ADMIN — SACUBITRIL AND VALSARTAN 1 TABLET: 24; 26 TABLET, FILM COATED ORAL at 10:05

## 2021-05-17 RX ADMIN — FUROSEMIDE 40 MG: 10 INJECTION, SOLUTION INTRAMUSCULAR; INTRAVENOUS at 10:05

## 2021-05-17 RX ADMIN — ENOXAPARIN SODIUM 40 MG: 40 INJECTION SUBCUTANEOUS at 10:05

## 2021-05-17 RX ADMIN — DOXYCYCLINE HYCLATE 100 MG: 100 TABLET, COATED ORAL at 10:04

## 2021-05-17 RX ADMIN — Medication 10 ML: at 15:12

## 2021-05-17 RX ADMIN — POTASSIUM CHLORIDE 10 MEQ: 750 TABLET, FILM COATED, EXTENDED RELEASE ORAL at 10:04

## 2021-05-17 RX ADMIN — CARVEDILOL 12.5 MG: 12.5 TABLET, FILM COATED ORAL at 18:26

## 2021-05-17 RX ADMIN — ASPIRIN 81 MG: 81 TABLET, COATED ORAL at 10:04

## 2021-05-17 RX ADMIN — GUAIFENESIN 600 MG: 600 TABLET, EXTENDED RELEASE ORAL at 10:04

## 2021-05-17 RX ADMIN — Medication 10 ML: at 05:38

## 2021-05-17 RX ADMIN — Medication 10 ML: at 21:50

## 2021-05-17 RX ADMIN — CLOPIDOGREL BISULFATE 75 MG: 75 TABLET ORAL at 10:04

## 2021-05-17 RX ADMIN — ATORVASTATIN CALCIUM 40 MG: 40 TABLET, FILM COATED ORAL at 10:04

## 2021-05-17 RX ADMIN — GUAIFENESIN 600 MG: 600 TABLET, EXTENDED RELEASE ORAL at 21:49

## 2021-05-17 RX ADMIN — FUROSEMIDE 40 MG: 10 INJECTION, SOLUTION INTRAMUSCULAR; INTRAVENOUS at 18:26

## 2021-05-17 NOTE — PROGRESS NOTES
Problem: Falls - Risk of  Goal: *Absence of Falls  Description: Document Melina Montgomery Fall Risk and appropriate interventions in the flowsheet.   Outcome: Progressing Towards Goal  Note: Fall Risk Interventions:  Mobility Interventions: Bed/chair exit alarm, Communicate number of staff needed for ambulation/transfer, Patient to call before getting OOB         Medication Interventions: Bed/chair exit alarm, Evaluate medications/consider consulting pharmacy, Patient to call before getting OOB    Elimination Interventions: Bed/chair exit alarm, Call light in reach, Patient to call for help with toileting needs              Problem: Patient Education: Go to Patient Education Activity  Goal: Patient/Family Education  Outcome: Progressing Towards Goal

## 2021-05-17 NOTE — PROGRESS NOTES
Hospitalist Progress Note    NAME: Kristen Culp   :  1945   MRN:  [id number]       Assessment / Plan:  Acute on chronic systolic heart failure  Mild troponin elevation may be demand ischemia  Acute bronchitis   CAD s/p stent placement  -BNP is elevated more than 35,000. Chest x-ray shows no acute process. Pt has crackles on admission.   -recent Echo showed EF  25-30%, EKG neg for acute ischemia. Trop 0.08-->0.07  -pt with congestion and cough. Check procalcitonin. Will add mucinex and doxycycline.  -cont' Lasix 40 mg IV twice daily. Continue home Coreg and Entresto  -Strict I's and O's, daily weights and low-salt diet  -currently stable on RA  -cardiology consultation      History of coronary artery disease s/p PCI  History of peripheral arterial disease s/p bilateral lower extremity amputations  COPD not in exacerbation  -Continue home aspirin, atorvastatin, Coreg, Plavix       Code Status: Full code  Surrogate Decision Maker:  Jonny   DVT Prophylaxis: Lovenox  GI Prophylaxis: not indicated   Baseline: From home, lives with daughter-in-law, has bilateral lower extremity amputations     Subjective:     Chief Complaint / Reason for Physician Visit  Pt awake, NAD. C/o cough and congestion. Denies cp or sob. Discussed with RN events overnight. Review of Systems:  Symptom Y/N Comments  Symptom Y/N Comments   Fever/Chills    Chest Pain     Poor Appetite    Edema     Cough    Abdominal Pain     Sputum    Joint Pain     SOB/BRINK    Pruritis/Rash     Nausea/vomit    Tolerating PT/OT     Diarrhea    Tolerating Diet     Constipation    Other       Could NOT obtain due to:      Objective:     VITALS:   Last 24hrs VS reviewed since prior progress note.  Most recent are:  Patient Vitals for the past 24 hrs:   Temp Pulse Resp BP SpO2   21 0731 97.6 °F (36.4 °C) 65 20 (!) 157/93 98 %   21 0432  69  (!) 141/93    21 0300 97.5 °F (36.4 °C) 76 20 (!) 189/69 98 %   21 2311 97.7 °F (36.5 °C) 64 20 (!) 156/68 100 %   05/16/21 2029 97.6 °F (36.4 °C) 68 18 138/60 98 %   05/16/21 1600 97.8 °F (36.6 °C) (!) 52 20 (!) 148/81 99 %   05/16/21 1149 98 °F (36.7 °C) 65 20 (!) 146/62 100 %   05/16/21 0858 97.8 °F (36.6 °C) (!) 55 20 (!) 147/92 91 %       Intake/Output Summary (Last 24 hours) at 5/17/2021 0854  Last data filed at 5/17/2021 0427  Gross per 24 hour   Intake 200 ml   Output 100 ml   Net 100 ml        I had a face to face encounter and independently examined this patient on 5/17/2021, as outlined below:  PHYSICAL EXAM:  General: WD, WN. Alert, cooperative, no acute distress    EENT:  EOMI. Anicteric sclerae. MMM  Resp:  Coarse bs b/l.  no wheezing or rales. No accessory muscle use  CV:  Regular  rhythm,  No edema  GI:  Soft, Non distended, Non tender. +Bowel sounds  Neurologic:  Alert and oriented X 3, normal speech,   Psych:   Good insight. Not anxious nor agitated  Skin:  No rashes. No jaundice    Reviewed most current lab test results and cultures  YES  Reviewed most current radiology test results   YES  Review and summation of old records today    NO  Reviewed patient's current orders and MAR    YES  PMH/ reviewed - no change compared to H&P  ________________________________________________________________________  Care Plan discussed with:    Comments   Patient x    Family      RN x    Care Manager     Consultant                        Multidiciplinary team rounds were held today with , nursing, pharmacist and clinical coordinator. Patient's plan of care was discussed; medications were reviewed and discharge planning was addressed.      ________________________________________________________________________  Total NON critical care TIME: 35  Minutes    Total CRITICAL CARE TIME Spent:   Minutes non procedure based      Comments   >50% of visit spent in counseling and coordination of care ________________________________________________________________________  Riley Whitlock MD     Procedures: see electronic medical records for all procedures/Xrays and details which were not copied into this note but were reviewed prior to creation of Plan. LABS:  I reviewed today's most current labs and imaging studies.   Pertinent labs include:  Recent Labs     05/17/21 0308 05/16/21  0430   WBC 5.3 4.9   HGB 11.3* 12.2   HCT 34.8* 37.9   * 134*     Recent Labs     05/17/21 0308 05/16/21  0430    137   K 4.0 4.3    102   CO2 31 31   GLU 87 88   BUN 30* 28*   CREA 1.19* 1.04*   CA 9.2 8.9   ALB  --  3.4*   TBILI  --  0.7   ALT  --  34       Signed: Riley Whitlock MD

## 2021-05-17 NOTE — PROGRESS NOTES
Received notification from bedside RN about patient with regards to: elevated BP, minimal U/O since Lasix at 4 pm  VS: /69, HR 76, RR 20, O2 sat 98% on RA    Intervention given: Hydralazine IV prn, bladder scan ordered

## 2021-05-17 NOTE — CONSULTS
Consult    NAME: Kristen Culp   :  1945   MRN:  [id number]     Date/Time:  2021 12:15 PM    Patient PCP: Kim Verduzco MD  ________________________________________________________________________     Assessment:     Acute on chronic systolic chf    - Anterior MI with multivessel stenting 2020  - ICM EF 25% Echo 3/2021  - Sinus with poor R wave  - HTN  - dyslpidemia  - CKD  - PvD s/p bilateral BKA  , uses a wheelchair at home  Cardiologist:  [address] [place] [address]:     Acute on chronic systolic chf  Minimally equivicol troponin, manage medically  Sinus    - Cont ASA, Plavix  - Cont coreg  - cont entresto, titrate if able  - cont iv lasix bid, was on lasix 40mg daily as outpt, follow bmp  - Cont statin          [x]        High complexity decision making was performed        Subjective:   CHIEF COMPLAINT:  Weakness, shortness of breath    HISTORY OF PRESENT ILLNESS:       Jennifer Núñez is a 68 y.o. female who presents with past medical history of congestive heart failure, COPD, coronary artery disease, peripheral arterial disease coming the hospital chief complaints of shortness of breath, generalized weakness. Patient reports that being revealed significantly about 3 days ago when she felt weakness involving all her extremities. She also reported some exertional shortness of breath with mild cough and wheezing. Does not report any fever or chills. Does not report any chest pain. Denies abdominal pain, nausea or vomiting. She reports being compliant with her medications.     On arrival to ED, vital signs are within normal limits except for slightly elevated blood pressure of 160/89. On labs CBC was normal.  BMP showed a creatinine of 1.04. LFTs were normal.  proBNP was 35,000. Chest x-ray shows no acute changes.       Feels better after diuresis    We were asked to consult for work up and evaluation of the above problems.      Past Medical History:   Diagnosis Date    Arthritis     was in legs    Hypertension     Other ill-defined conditions(799.89)     extremety vascular disease    PAD (peripheral artery disease) ([address]     PVD (peripheral vascular disease) ([address]       Past Surgical History:   Procedure Laterality Date    COLONOSCOPY N/A 2018    COL[facility]OSCOPY performed by [facility] at [facility] ENDOSCOPY    HX AMPUTATION      Right Above Knee Amputation    HX AMPUTATION  2011    Left Below Knee Amputation    HX ORTHOPAEDIC  12     Debridement Left BKA Stump and Placement of Wound [facility]    HX OTHER SURGICAL  10/24/12    SPLIT THICKNESS SKIN GRAFT FROM RIGHT THIGH TO BELOW KNEE AMPUTATION STUMP (to wound left BKA stump    MO VEIN BYPASS GRAFT,FEM-TIBIAL  3/04/2011    Left femoral post. tibial bypass with vein     Allergies   Allergen Reactions    Percocet [Oxycodone-Acetaminophen] Nausea and Vomiting      Meds:  See below  Social History     Tobacco Use    Smoking status: Former Smoker     Quit date: 2008     Years since quittin.8    Smokeless tobacco: Never Used   Substance Use Topics    Alcohol use: No      Family History   Problem Relation Age of Onset    Hypertension Mother     Hypertension Father     Diabetes Sister     Diabetes Brother        REVIEW OF SYSTEMS:     []         Unable to obtain  ROS due to ---   [x]         Total of 12 systems reviewed as follows:     Total of 12 systems reviewed as follows:       POSITIVE= Bold text  Negative = normal text  General:  fever, chills, sweats, generalized weakness, weight loss/gain,      loss of appetite   Eyes:    blurred vision, eye pain, loss of vision, double vision  ENT:    rhinorrhea, pharyngitis   Respiratory:   cough, sputum production, SOB, BRINK, wheezing, pleuritic pain   Cardiology:   chest pain, palpitations, orthopnea, PND, edema, syncope   Gastrointestinal:  abdominal pain , N/V, diarrhea, dysphagia, constipation, bleeding   Genitourinary:  frequency, urgency, dysuria, hematuria, incontinence   Muskuloskeletal :  arthralgia, myalgia, back pain  Hematology:  easy bruising, nose or gum bleeding, lymphadenopathy   Dermatological: rash, ulceration, pruritis, color change / jaundice  Endocrine:   hot flashes or polydipsia   Neurological:  headache, dizziness, confusion, focal weakness, paresthesia,     Speech difficulties, memory loss, gait difficulty  Psychological: Feelings of anxiety, depression, agitation    Objective:      Physical Exam:    Last 24hrs VS reviewed since prior progress note. Most recent are:    Visit Vitals  BP (!) 141/84 (BP 1 Location: Left upper arm, BP Patient Position: At rest)   Pulse 71   Temp 97.8 °F (36.6 °C)   Resp 20   Ht 3' 10\" (1.168 m)   Wt 75.5 kg (166 lb 8 oz)   LMP  (LMP Unknown)   SpO2 95%   BMI 55.32 kg/m²       Intake/Output Summary (Last 24 hours) at 5/17/2021 1215  Last data filed at 5/17/2021 0427  Gross per 24 hour   Intake 200 ml   Output 100 ml   Net 100 ml        General Appearance: Bilateral ampuation, well nourished, alert & oriented x 3,    no acute distress. Ears/Nose/Mouth/Throat: Pupils equal and round, Hearing grossly normal.  Neck: Supple. JVP elevated . Carotids good upstrokes, with no bruit. Chest: Lungs bibasilar cracklse to auscultation bilaterally. Cardiovascular: Regular rate and rhythm, S1S2 normal, no murmur, rubs, gallops. Abdomen: Soft, non-tender, bowel sounds are active. No organomegaly. Extremities: Bilateral amputation  Skin: Warm and dry. Neuro: CN II-XII grossly intact, Strength and sensation grossly intact. Data:      Prior to Admission medications    Medication Sig Start Date End Date Taking? Authorizing Provider   sacubitriL-valsartan Loretha Cinnamon) 24-26 mg tablet Take 1 Tab by mouth two (2) times a day. Yes Other, MD Stephani   furosemide (LASIX) 40 mg tablet Take 1 Tab by mouth daily. 3/30/21  Yes Ilda Lawson MD   potassium chloride (K-DUR, KLOR-CON) 20 mEq tablet Take 0.5 Tabs by mouth daily. 3/30/21  Yes Bryce Jasmine MD   aspirin delayed-release 81 mg tablet Take  by mouth daily. Yes Stephani Staples MD   clopidogreL (PLAVIX) 75 mg tab Take 1 Tab by mouth daily. 5/11/20  Yes Camryn Binhgam MD   carvediloL (Coreg) 12.5 mg tablet Take 12.5 mg by mouth two (2) times daily (with meals). Provider, Historical   gabapentin (NEURONTIN) 300 mg capsule Take 300 mg by mouth as needed for Pain. Provider, Historical   atorvastatin (Lipitor) 40 mg tablet Take 40 mg by mouth daily.     Other, MD Stephani       Recent Results (from the past 24 hour(s))   URINALYSIS W/ REFLEX CULTURE    Collection Time: 05/16/21  4:03 PM    Specimen: Urine   Result Value Ref Range    Color YELLOW/STRAW      Appearance CLEAR CLEAR      Specific gravity 1.014 1.003 - 1.030      pH (UA) 6.5 5.0 - 8.0      Protein 100 (A) NEG mg/dL    Glucose Negative NEG mg/dL    Ketone Negative NEG mg/dL    Bilirubin Negative NEG      Blood TRACE (A) NEG      Urobilinogen 1.0 0.2 - 1.0 EU/dL    Nitrites Negative NEG      Leukocyte Esterase Negative NEG      WBC 0-4 0 - 4 /hpf    RBC 5-10 0 - 5 /hpf    Epithelial cells MODERATE (A) FEW /lpf    Bacteria Negative NEG /hpf    UA:UC IF INDICATED CULTURE NOT INDICATED BY UA RESULT CNI      Hyaline cast 0-2 0 - 5 /lpf   TROPONIN I    Collection Time: 05/16/21  4:03 PM   Result Value Ref Range    Troponin-I, Qt. 0.07 (H) <7.24 ng/mL   METABOLIC PANEL, BASIC    Collection Time: 05/17/21  3:08 AM   Result Value Ref Range    Sodium 137 136 - 145 mmol/L    Potassium 4.0 3.5 - 5.1 mmol/L    Chloride 101 97 - 108 mmol/L    CO2 31 21 - 32 mmol/L    Anion gap 5 5 - 15 mmol/L    Glucose 87 65 - 100 mg/dL    BUN 30 (H) 6 - 20 MG/DL    Creatinine 1.19 (H) 0.55 - 1.02 MG/DL    BUN/Creatinine ratio 25 (H) 12 - 20      GFR est AA 53 (L) >60 ml/min/1.73m2    GFR est non-AA 44 (L) >60 ml/min/1.73m2    Calcium 9.2 8.5 - 10.1 MG/DL   CBC W/O DIFF    Collection Time: 05/17/21  3:08 AM   Result Value Ref Range    WBC 5.3 3.6 - 11.0 K/uL    RBC 4.80 3.80 - 5.20 M/uL    HGB 11.3 (L) 11.5 - 16.0 g/dL    HCT 34.8 (L) 35.0 - 47.0 %    MCV 72.5 (L) 80.0 - 99.0 FL    MCH 23.5 (L) 26.0 - 34.0 PG    MCHC 32.5 30.0 - 36.5 g/dL    RDW 21.9 (H) 11.5 - 14.5 %    PLATELET 813 (L) 788 - 400 K/uL    MPV ABNORMAL 8.9 - 12.9 FL    NRBC 0.0 0  WBC    ABSOLUTE NRBC 0.00 0.00 - 0.01 K/uL   PROCALCITONIN    Collection Time: 05/17/21 11:01 AM   Result Value Ref Range    Procalcitonin <0.05 ng/mL

## 2021-05-18 LAB
ANION GAP SERPL CALC-SCNC: 6 MMOL/L (ref 5–15)
BNP SERPL-MCNC: ABNORMAL PG/ML
BUN SERPL-MCNC: 32 MG/DL (ref 6–20)
BUN/CREAT SERPL: 28 (ref 12–20)
CALCIUM SERPL-MCNC: 9.1 MG/DL (ref 8.5–10.1)
CHLORIDE SERPL-SCNC: 99 MMOL/L (ref 97–108)
CO2 SERPL-SCNC: 31 MMOL/L (ref 21–32)
CREAT SERPL-MCNC: 1.16 MG/DL (ref 0.55–1.02)
GLUCOSE SERPL-MCNC: 85 MG/DL (ref 65–100)
POTASSIUM SERPL-SCNC: 4.2 MMOL/L (ref 3.5–5.1)
SODIUM SERPL-SCNC: 136 MMOL/L (ref 136–145)

## 2021-05-18 PROCEDURE — 74011250637 HC RX REV CODE- 250/637: Performed by: INTERNAL MEDICINE

## 2021-05-18 PROCEDURE — 80048 BASIC METABOLIC PNL TOTAL CA: CPT

## 2021-05-18 PROCEDURE — 65660000000 HC RM CCU STEPDOWN

## 2021-05-18 PROCEDURE — 74011250636 HC RX REV CODE- 250/636: Performed by: INTERNAL MEDICINE

## 2021-05-18 PROCEDURE — 83880 ASSAY OF NATRIURETIC PEPTIDE: CPT

## 2021-05-18 RX ADMIN — ENOXAPARIN SODIUM 40 MG: 40 INJECTION SUBCUTANEOUS at 09:09

## 2021-05-18 RX ADMIN — FUROSEMIDE 40 MG: 10 INJECTION, SOLUTION INTRAMUSCULAR; INTRAVENOUS at 09:09

## 2021-05-18 RX ADMIN — GUAIFENESIN 600 MG: 600 TABLET, EXTENDED RELEASE ORAL at 21:33

## 2021-05-18 RX ADMIN — DOXYCYCLINE HYCLATE 100 MG: 100 TABLET, COATED ORAL at 09:09

## 2021-05-18 RX ADMIN — GUAIFENESIN 600 MG: 600 TABLET, EXTENDED RELEASE ORAL at 09:09

## 2021-05-18 RX ADMIN — Medication 10 ML: at 09:10

## 2021-05-18 RX ADMIN — CLOPIDOGREL BISULFATE 75 MG: 75 TABLET ORAL at 09:09

## 2021-05-18 RX ADMIN — SACUBITRIL AND VALSARTAN 1 TABLET: 49; 51 TABLET, FILM COATED ORAL at 21:33

## 2021-05-18 RX ADMIN — POTASSIUM CHLORIDE 10 MEQ: 750 TABLET, FILM COATED, EXTENDED RELEASE ORAL at 09:09

## 2021-05-18 RX ADMIN — Medication 10 ML: at 13:44

## 2021-05-18 RX ADMIN — ASPIRIN 81 MG: 81 TABLET, COATED ORAL at 09:09

## 2021-05-18 RX ADMIN — FUROSEMIDE 40 MG: 10 INJECTION, SOLUTION INTRAMUSCULAR; INTRAVENOUS at 17:14

## 2021-05-18 RX ADMIN — ATORVASTATIN CALCIUM 40 MG: 40 TABLET, FILM COATED ORAL at 09:09

## 2021-05-18 RX ADMIN — CARVEDILOL 12.5 MG: 12.5 TABLET, FILM COATED ORAL at 17:14

## 2021-05-18 RX ADMIN — DOXYCYCLINE HYCLATE 100 MG: 100 TABLET, COATED ORAL at 21:33

## 2021-05-18 RX ADMIN — CARVEDILOL 12.5 MG: 12.5 TABLET, FILM COATED ORAL at 09:09

## 2021-05-18 RX ADMIN — SACUBITRIL AND VALSARTAN 1 TABLET: 49; 51 TABLET, FILM COATED ORAL at 13:43

## 2021-05-18 NOTE — CARDIO/PULMONARY
Cardiac Rehab Note: chart review/referral  
 
CHF 
 
EF 25-30%  on 3/28/21 per echo Smoking history assessed. Patient is a former smoker. Smoking Cessation Program link has not been added to the AVS. \"Living with Heart Failure\" book with daily weight calendar and s/s of heart failure magnet provided to Valeria Wangumb on 5/18/21 Patient was not available/able to meet at this time. No family present at bedside. Per chart, patient uses wheelchair at home. CP Rehab will attempt to follow up.

## 2021-05-18 NOTE — PROGRESS NOTES
1100-Patient given incentive spirometer, return demonstration of use given. 1900-    End of Shift Note    Bedside shift change report given to [address] [place] [address] (oncoming nurse) by Ramu Diaz (offgoing nurse). Report included the following information SBAR, Kardex, Procedure Summary, Intake/Output, MAR and Recent Results    Shift worked:  7a-7p     Shift summary and any significant changes:     pt reports feeling better, poor appetite     Concerns for physician to address:  d/c plan     Zone phone for oncoming shift:          Activity:  Activity Level: Bed Rest  Number times ambulated in hallways past shift: 0  Number of times OOB to chair past shift: 0    Cardiac:   Cardiac Monitoring: Yes      Cardiac Rhythm: Sinus Rhythm    Access:   Current line(s): PIV     Genitourinary:   Urinary status: external catheter    Respiratory:   O2 Device: None (Room air)  Chronic home O2 use?: NO  Incentive spirometer at bedside: YES     GI:  Last Bowel Movement Date: 05/15/21  Current diet:  DIET CARDIAC Regular  DIET NUTRITIONAL SUPPLEMENTS Dinner, Lunch; Ensure Pudding  DIET NUTRITIONAL SUPPLEMENTS Breakfast; Ensure Compact  Passing flatus: YES  Tolerating current diet: YES       Pain Management:   Patient states pain is manageable on current regimen: YES    Skin:  Samy Score: 17  Interventions: increase time out of bed and foam dressing    Patient Safety:  Fall Score:  Total Score: 3  Interventions: assistive device (walker, cane, etc)  High Fall Risk: Yes    Length of Stay:  Expected LOS: 4d 0h  Actual LOS: [address]

## 2021-05-18 NOTE — PROGRESS NOTES
End of Shift Note    Bedside shift change report given to Malcolm Roper (oncoming nurse) by Emeli Maguire (offgoing nurse). Report included the following information SBAR, Kardex, Intake/Output, MAR and Accordion    Shift worked:  [id number]     Shift summary and any significant changes:     Patient slept most of shift. She is voiding with the purewick. Concerns for physician to address:       Zone phone for oncoming shift:   [id number]       Activity:  Activity Level: Up with Assistance  Number times ambulated in hallways past shift: 0  Number of times OOB to chair past shift: 0    Cardiac:   Cardiac Monitoring: Yes           Access:   Current line(s): PIV     Genitourinary:   Urinary status: voiding and external catheter    Respiratory:   O2 Device: None (Room air)  Chronic home O2 use?: NO  Incentive spirometer at bedside: NO     GI:  Last Bowel Movement Date: 05/15/21  Current diet:  DIET CARDIAC Regular  Passing flatus: YES  Tolerating current diet: YES       Pain Management:   Patient states pain is manageable on current regimen: N/A    Skin:  Samy Score: 19  Interventions: increase time out of bed and internal/external urinary devices    Patient Safety:  Fall Score:  Total Score: 3  Interventions: pt to call before getting OOB  High Fall Risk: Yes    Length of Stay:  Expected LOS: 4d 0h  Actual LOS: [facility]

## 2021-05-18 NOTE — PROGRESS NOTES
Comprehensive Nutrition Assessment    Type and Reason for Visit: Initial, Positive nutrition screen    Nutrition Recommendations/Plan:   · Continue Cardiac/2gm Na+ diet. · RD ordered Ensure Compact 4oz shake/Ensure pudding to try. · Please document % meals and supplements consumed in flowsheet I/O's under intake. Nutrition Assessment:     5/18: Chart reviewed; med noted for acute exacerbation of CHF/ Hx of HTN, NSTEMI. Pt noted to have a right AKA and left BKA. Per daughter, height prior to amputation 5'2\" - 5'3\". Daughter concerned due to recent decreased appetite and comments that pt usually tires out. Likely secondary to fluid overload. RD calculated pt's nutritional needs and given small stature needs are around 1200 kcals/day. Should be able to meet needs via ~50% of meals + ensure pudding/ensure compact combination 2 per/day. RD ordered volume-restricted ONS which pt/daughter agreeable. RD encouraged lower sodium foods which may reduced fluid retention, in turn improving appetite. Pt commented that she usually likes bologna and cantu at home. Discussed lower sodium protein rich foods. Also, provided a menu for daughter to assist with menu selections/optimal nutritional intake. Patient Vitals for the past 168 hrs:   % Diet Eaten   05/18/21 0806 1 - 25%     Last Weight Metric  Weight Loss Metrics 5/18/2021 3/28/2021 5/8/2020 11/7/2018 11/5/2012 10/24/2012 10/22/2012   Today's Wt 160 lb 4.8 oz 134 lb 14.7 oz 134 lb 14.7 oz 135 lb 130 lb 130 lb 136 lb 5 oz   BMI 29.32 kg/m2 37.94 kg/m2 37.94 kg/m2 37.97 kg/m2 22.3 kg/m2 22.3 kg/m2 23.39 kg/m2     Estimated Daily Nutrient Needs:  Energy (kcal): 1100 ( x 1. 2AF); Weight Used for Energy Requirements: Current  Protein (g): 73 (1.0 g/kg bw); Weight Used for Protein Requirements: Current  Fluid (ml/day): per MD (CHF);  Method Used for Fluid Requirements: 1 ml/kcal    Nutrition Related Findings:  BM: 5/15; Labs: reviewed; Meds: plavix, lasix      Wounds: None       Current Nutrition Therapies:  DIET CARDIAC Regular  DIET NUTRITIONAL SUPPLEMENTS Dinner, Lunch; Ensure Pudding  DIET NUTRITIONAL SUPPLEMENTS Breakfast; Ensure Compact    Anthropometric Measures:  · Height:  5' 2\" (157.5 cm)  · Current Body Wt:  72.7 kg (160 lb 4.4 oz)   · Ideal Body Wt:  110 lbs:  145.7 %   · Adjusted Body Weight:  185.9; Weight Adjustment for: Amputation   · Adjusted BMI:  34    · BMI Category:  Obese class 1 (BMI 30.0-34. 9)       Nutrition Diagnosis:   · Inadequate protein-energy intake related to (decreased appetite secondary to volume overload, SOB) as evidenced by intake 0-25%(added ONS)    rition Interventions:   Food and/or Nutrient Delivery: Continue current diet, Start oral nutrition supplement  Nutrition Education and Counseling: No recommendations at this time  Coordination of Nutrition Care: No recommendation at this time    Goals:  Trend PO intake at least 25-50% of meals + consume 240 ml ONS next 5-7 days       Nutrition Monitoring and Evaluation:   Behavioral-Environmental Outcomes: None identified  Food/Nutrient Intake Outcomes: Food and nutrient intake, Supplement intake  Physical Signs/Symptoms Outcomes: Biochemical data, Weight, Fluid status or edema    Discharge Planning:    Continue current diet     Electronically signed by Nathalie Pathak RD on 5/18/2021 at 1:41 PM

## 2021-05-18 NOTE — PROGRESS NOTES
Transition of Care Plan:    RUR:18% low  Disposition: Home (patient states she will not need Home Health)  Follow up appointments: PCP, Cardiology  DME needed:  Pt reports she has old wheelchair at home but would like to see if she qualifies for new one and would like to have bedside commode ordered if possible  Transportation at Discharge: Spouse will transport  Carroll Gabriel or means to access home:  Spouse has access    IM Medicare letter: Pt will need 2nd IMM letter at discharge  Caregiver Contact: Spouse, Dalila Ford ([phone])  Discharge Caregiver contacted prior to discharge? Pt states she will inform her family of discharge plans. Reason for Admission:  Shortness of breath, generalized weakness                   RUR Score:  18% low                   Plan for utilizing home health:    Pt states she does not need HH      PCP: First and Last name:  Jordan Mercedes MD     Name of Practice:    Are you a current patient: Yes/No:  yes   Approximate date of last visit: 2 weeks ago   Can you participate in a virtual visit with your PCP:  yes                    Current Advanced Directive/Advance Care Plan: Full Code      Healthcare Decision Maker:   Click here to complete [address] including selection of the Healthcare Decision Maker Relationship (ie \"Primary\")             Primary Decision MakerBohdaaiyana [facility] - [phone]                  Transition of Care Plan:     Home with family assistance and follow up appointments. Care Management Interventions  PCP Verified by CM: Yes(2 weeks ago)  Mode of Transport at Discharge: Other (see comment)(Spouse will transport)  Current Support Network: Lives with Spouse, Own Home  Confirm Follow Up Transport: Family  [facility]  [facility] [address] [facility]  Advanced Steps ACP Facilitator  Zone Phone: [phone]

## 2021-05-18 NOTE — PROGRESS NOTES
Progress Note      5/18/2021 10:48 AM  NAME: Kath Todd   MRN:  [id number]   Admit Diagnosis: Acute exacerbation of CHF (congestive heart failure) ([facility] [I50.9]     Assessment:     Acute on chronic systolic chf         Pro BNP > 35,000     - Anterior MI with multivessel stenting 5/2020  - ICM EF 25% Echo 3/2021  - Sinus with poor R wave  - HTN    Inadequately controlled. - dyslpidemia  - CKD  - PvD s/p bilateral BKA  , uses a wheelchair at home            Plan:     continue with diuresis. Titrate heart failure meds. [x]        High complexity decision making was performed    Subjective:     Kath Todd denies chest pain, dyspnea. Discussed with RN events overnight. Patient Active Problem List   Diagnosis Code    HTN (hypertension) I10    Depressive disorder, not elsewhere classified F32.9    PVD (peripheral vascular disease) ([facility] I73.9    Nausea with vomiting R11.2    HTN (hypertension) I10    Chronic hyponatremia E87.1    Sinus bradycardia R00.1    MRSA infection within last 3 months Z86.14    Status post skin graft Z94.5    STEMI (ST elevation myocardial infarction) ([facility] I21.3    STEMI involving left anterior descending coronary artery (HCC) I21.02    Anorexia R63.0    Abdominal pain R10.9    CHF exacerbation (HCC) I50.9    Acute exacerbation of CHF (congestive heart failure) ([facility]) I50.9       Review of Systems:    Symptom Y/N Comments  Symptom Y/N Comments   Fever/Chills N   Chest Pain N    Poor Appetite N   Edema N    Cough N   Abdominal Pain N    Sputum N   Joint Pain N    SOB/BRINK N   Pruritis/Rash N    Nausea/vomit N   Tolerating PT/OT Y    Diarrhea N   Tolerating Diet Y    Constipation N   Other       Could NOT obtain due to:      Objective:      Physical Exam:    Last 24hrs VS reviewed since prior progress note.  Most recent are:    Visit Vitals  BP (!) 150/87   Pulse 78   Temp 97.4 °F (36.3 °C)   Resp 20   Ht 3' 10\" (1.168 m)   Wt 72.7 kg (160 lb 4.8 oz)   LMP  (LMP Unknown)   SpO2 100%   BMI 53.26 kg/m²       Intake/Output Summary (Last 24 hours) at 5/18/2021 1048  Last data filed at 5/18/2021 0806  Gross per 24 hour   Intake 100 ml   Output 1000 ml   Net -900 ml        General Appearance: Well developed, well nourished, alert & oriented x 3,    no acute distress. Ears/Nose/Mouth/Throat: Hearing grossly normal.  Neck: Supple. Chest: Lungs clear to auscultation bilaterally. Cardiovascular: Regular rate and rhythm, S1S2 normal, no murmur. Abdomen: Soft, non-tender, bowel sounds are active. Extremities: No edema bilaterally. Skin: Warm and dry. PMH/SH reviewed - no change compared to H&P    Data Review    Telemetry: normal sinus rhythm     Lab Data Personally Reviewed:    Recent Labs     05/17/21  0308 05/16/21  0430   WBC 5.3 4.9   HGB 11.3* 12.2   HCT 34.8* 37.9   * 134*   LABRCNT(INR:3,PTP:3,APTT:3,)  Recent Labs     05/18/21  0244 05/17/21  0308 05/16/21  0430    137 137   K 4.2 4.0 4.3   CL 99 101 102   CO2 31 31 31   BUN 32* 30* 28*   CREA 1.16* 1.19* 1.04*   GLU 85 87 88   CA 9.1 9.2 8.9   LABRCNT(CPK:3,CpKMB:3,ckndx:3,troiq:3)  Lab Results   Component Value Date/Time    Cholesterol, total 144 08/19/2012 04:00 AM    HDL Cholesterol 43 08/19/2012 04:00 AM    LDL, calculated 86.6 08/19/2012 04:00 AM    Triglyceride 72 08/19/2012 04:00 AM    CHOL/HDL Ratio 3.3 08/19/2012 04:00 AM   LABRCNT(sgot:3,gpt:3,ap:3,tbiL:3,TP:3,ALB:3,GLOB:3,ggt:3,aml:3,amyp:3,lpse:3,hlpse:3)No results for input(s): PH, PCO2, PO2 in the last 72 hours. Lab Results   Component Value Date/Time    Cholesterol, total 144 08/19/2012 04:00 AM    HDL Cholesterol 43 08/19/2012 04:00 AM    LDL, calculated 86.6 08/19/2012 04:00 AM    Triglyceride 72 08/19/2012 04:00 AM    CHOL/HDL Ratio 3.3 08/19/2012 04:00 AM   MEDTABLELukasz Robles MD  No results for input(s): PH, PCO2, PO2 in the last 72 hours.     Medications Personally Reviewed:    Current Facility-Administered Medications   Medication Dose Route Frequency    hydrALAZINE (APRESOLINE) 20 mg/mL injection 10 mg  10 mg IntraVENous Q4H PRN    doxycycline (VIBRA-TABS) tablet 100 mg  100 mg Oral Q12H    guaiFENesin ER (MUCINEX) tablet 600 mg  600 mg Oral Q12H    aspirin delayed-release tablet 81 mg  81 mg Oral DAILY    atorvastatin (LIPITOR) tablet 40 mg  40 mg Oral DAILY    carvediloL (COREG) tablet 12.5 mg  12.5 mg Oral BID WITH MEALS    clopidogreL (PLAVIX) tablet 75 mg  75 mg Oral DAILY    potassium chloride SR (KLOR-CON 10) tablet 10 mEq  10 mEq Oral DAILY    sacubitriL-valsartan (ENTRESTO) 24-26 mg tablet 1 Tab  1 Tab Oral BID    sodium chloride (NS) flush 5-40 mL  5-40 mL IntraVENous Q8H    sodium chloride (NS) flush 5-40 mL  5-40 mL IntraVENous PRN    acetaminophen (TYLENOL) tablet 650 mg  650 mg Oral Q6H PRN    Or    acetaminophen (TYLENOL) suppository 650 mg  650 mg Rectal Q6H PRN    polyethylene glycol (MIRALAX) packet 17 g  17 g Oral DAILY PRN    promethazine (PHENERGAN) tablet 12.5 mg  12.5 mg Oral Q6H PRN    Or    ondansetron (ZOFRAN) injection 4 mg  4 mg IntraVENous Q6H PRN    enoxaparin (LOVENOX) injection 40 mg  40 mg SubCUTAneous DAILY    furosemide (LASIX) injection 40 mg  40 mg IntraVENous BID         Ethel Melchor MD

## 2021-05-18 NOTE — PROGRESS NOTES
Hospitalist Progress Note    NAME: Driss Pierre   :  1945   MRN:  [id number]       Assessment / Plan:  Acute on chronic systolic heart failure  Mild troponin elevation may be demand ischemia  Acute bronchitis   CAD s/p stent placement  -BNP is elevated more than 35,000. Chest x-ray shows no acute process. Pt has crackles on admission.   -recent Echo showed EF  25-30%, EKG neg for acute ischemia. Trop 0.08-->0.07  -pt with congestion and cough. Check procalcitonin. Will add mucinex and doxycycline.  -cont' Lasix 40 mg IV twice daily. Continue home Coreg and Entresto  -Strict I's and O's, daily weights and low-salt diet  -currently stable on RA  -cardiology consultation appreciated     History of coronary artery disease s/p PCI  History of peripheral arterial disease s/p bilateral lower extremity amputations  COPD not in exacerbation  -Continue home aspirin, atorvastatin, Coreg, Plavix       Code Status: Full code  Surrogate Decision Maker:  Jonny   DVT Prophylaxis: Lovenox  GI Prophylaxis: not indicated   Baseline: From home, lives with daughter-in-law, has bilateral lower extremity amputations     Subjective:     Chief Complaint / Reason for Physician Visit  Pt awake, NAD. C/o cough and congestion. Denies cp or sob. Discussed with RN events overnight. Review of Systems:  Symptom Y/N Comments  Symptom Y/N Comments   Fever/Chills    Chest Pain     Poor Appetite    Edema     Cough    Abdominal Pain     Sputum    Joint Pain     SOB/BRINK    Pruritis/Rash     Nausea/vomit    Tolerating PT/OT     Diarrhea    Tolerating Diet     Constipation    Other       Could NOT obtain due to:      Objective:     VITALS:   Last 24hrs VS reviewed since prior progress note.  Most recent are:  Patient Vitals for the past 24 hrs:   Temp Pulse Resp BP SpO2   21 1136 98.6 °F (37 °C) (!) 53 20 (!) 136/93 98 %   21 0806 97.4 °F (36.3 °C) 78 20 (!) 150/87 100 %   21 0315 98.1 °F (36.7 °C) 66 18 (!) 144/78 99 %   05/18/21 0158  64 20     05/18/21 0018 97.9 °F (36.6 °C) 60 20 (!) 145/92 98 %   05/17/21 2310  75      05/17/21 2009 97.8 °F (36.6 °C) 65 20 134/81 95 %       Intake/Output Summary (Last 24 hours) at 5/18/2021 1459  Last data filed at 5/18/2021 9169  Gross per 24 hour   Intake 100 ml   Output 600 ml   Net -500 ml        I had a face to face encounter and independently examined this patient on 5/18/2021, as outlined below:  PHYSICAL EXAM:  General: WD, WN. Alert, cooperative, no acute distress    EENT:  EOMI. Anicteric sclerae. MMM  Resp:  Coarse bs b/l.  no wheezing or rales. No accessory muscle use  CV:  Regular  rhythm,  No edema  GI:  Soft, Non distended, Non tender. +BS  Neurologic:  Alert and oriented X 3, normal speech  Psych:   Good insight. Not anxious nor agitated  Skin:  No rashes. No jaundice    Reviewed most current lab test results and cultures  YES  Reviewed most current radiology test results   YES  Review and summation of old records today    NO  Reviewed patient's current orders and MAR    YES  PMH/SH reviewed - no change compared to H&P  ________________________________________________________________________  Care Plan discussed with:    Comments   Patient x    Family      RN x    Care Manager     Consultant                        Multidiciplinary team rounds were held today with , nursing, pharmacist and clinical coordinator. Patient's plan of care was discussed; medications were reviewed and discharge planning was addressed.      ________________________________________________________________________  Total NON critical care TIME: 35  Minutes    Total CRITICAL CARE TIME Spent:   Minutes non procedure based      Comments   >50% of visit spent in counseling and coordination of care     ________________________________________________________________________  Cristiano Dobson MD     Procedures: see electronic medical records for all procedures/Xrays and details which were not copied into this note but were reviewed prior to creation of Plan. LABS:  I reviewed today's most current labs and imaging studies.   Pertinent labs include:  Recent Labs     05/17/21  0308 05/16/21  0430   WBC 5.3 4.9   HGB 11.3* 12.2   HCT 34.8* 37.9   * 134*     Recent Labs     05/18/21  0244 05/17/21 0308 05/16/21  0430    137 137   K 4.2 4.0 4.3   CL 99 101 102   CO2 31 31 31   GLU 85 87 88   BUN 32* 30* 28*   CREA 1.16* 1.19* 1.04*   CA 9.1 9.2 8.9   ALB  --   --  3.4*   TBILI  --   --  0.7   ALT  --   --  34       Signed: Lanie Capellan MD

## 2021-05-18 NOTE — PROGRESS NOTES
Physical Therapy Note:  Patient cleared for evaluation by nursing. Upon arrival, patient deferred d/t abdominal pain and BRINK.  Patient endorses no difficulty with transfers but asked to return in the am.   Sha Rivera, PT, DPT

## 2021-05-19 LAB
ANION GAP SERPL CALC-SCNC: 8 MMOL/L (ref 5–15)
BUN SERPL-MCNC: 42 MG/DL (ref 6–20)
BUN/CREAT SERPL: 30 (ref 12–20)
CALCIUM SERPL-MCNC: 8.9 MG/DL (ref 8.5–10.1)
CHLORIDE SERPL-SCNC: 98 MMOL/L (ref 97–108)
CO2 SERPL-SCNC: 30 MMOL/L (ref 21–32)
CREAT SERPL-MCNC: 1.4 MG/DL (ref 0.55–1.02)
GLUCOSE SERPL-MCNC: 80 MG/DL (ref 65–100)
POTASSIUM SERPL-SCNC: 4.1 MMOL/L (ref 3.5–5.1)
SODIUM SERPL-SCNC: 136 MMOL/L (ref 136–145)

## 2021-05-19 PROCEDURE — 97162 PT EVAL MOD COMPLEX 30 MIN: CPT

## 2021-05-19 PROCEDURE — 97530 THERAPEUTIC ACTIVITIES: CPT

## 2021-05-19 PROCEDURE — 36415 COLL VENOUS BLD VENIPUNCTURE: CPT

## 2021-05-19 PROCEDURE — 74011250637 HC RX REV CODE- 250/637: Performed by: INTERNAL MEDICINE

## 2021-05-19 PROCEDURE — 80048 BASIC METABOLIC PNL TOTAL CA: CPT

## 2021-05-19 PROCEDURE — 74011250636 HC RX REV CODE- 250/636: Performed by: INTERNAL MEDICINE

## 2021-05-19 PROCEDURE — 77030038269 HC DRN EXT URIN PURWCK BARD -A

## 2021-05-19 PROCEDURE — 65660000000 HC RM CCU STEPDOWN

## 2021-05-19 RX ADMIN — DOXYCYCLINE HYCLATE 100 MG: 100 TABLET, COATED ORAL at 08:45

## 2021-05-19 RX ADMIN — ENOXAPARIN SODIUM 40 MG: 40 INJECTION SUBCUTANEOUS at 08:48

## 2021-05-19 RX ADMIN — POTASSIUM CHLORIDE 10 MEQ: 750 TABLET, FILM COATED, EXTENDED RELEASE ORAL at 08:45

## 2021-05-19 RX ADMIN — DOXYCYCLINE HYCLATE 100 MG: 100 TABLET, COATED ORAL at 21:36

## 2021-05-19 RX ADMIN — CARVEDILOL 12.5 MG: 12.5 TABLET, FILM COATED ORAL at 17:25

## 2021-05-19 RX ADMIN — GUAIFENESIN 600 MG: 600 TABLET, EXTENDED RELEASE ORAL at 08:45

## 2021-05-19 RX ADMIN — GUAIFENESIN 600 MG: 600 TABLET, EXTENDED RELEASE ORAL at 21:35

## 2021-05-19 RX ADMIN — Medication 10 ML: at 21:36

## 2021-05-19 RX ADMIN — ATORVASTATIN CALCIUM 40 MG: 40 TABLET, FILM COATED ORAL at 08:45

## 2021-05-19 RX ADMIN — CARVEDILOL 12.5 MG: 12.5 TABLET, FILM COATED ORAL at 08:45

## 2021-05-19 RX ADMIN — ASPIRIN 81 MG: 81 TABLET, COATED ORAL at 08:45

## 2021-05-19 RX ADMIN — CLOPIDOGREL BISULFATE 75 MG: 75 TABLET ORAL at 08:45

## 2021-05-19 NOTE — PROGRESS NOTES
PHYSICAL THERAPY EVALUATION/DISCHARGE  Patient: Joshua Parson (83 y.o. female)  Date: 5/19/2021  Primary Diagnosis: Acute exacerbation of CHF (congestive heart failure) ([facility] [I50.9]       Precautions:  Fall (R AKA and L BKA)      ASSESSMENT  Based on the objective data described below, the patient presents with mild generalized weakness, old R AKA and L BKA, independent bed mobility and sba bed to chair transfers using T-transfer technique. Currently she is functioning near her normal baseline of function which is independent at a wheelchair level and self transfers from bed to wheelchair via sliding technique. She was left in bedside recliner with all needs met and RN updated on her functional status. Functional Outcome Measure: The patient scored 55/100 on the Barthel outcome measure which is indicative of 45% functional impairment. Other factors to consider for discharge: good home support; lives with spouse in 1 story home with ramp. Further skilled acute physical therapy is not indicated at this time. Will sign off. PLAN :  Recommendation for discharge: (in order for the patient to meet his/her long term goals)  No skilled physical therapy/ follow up rehabilitation needs identified at this time. This discharge recommendation:  Has not yet been discussed the attending provider and/or case management    IF patient discharges home will need the following DME: patient owns DME required for discharge       SUBJECTIVE:   Patient stated I'm feeling better with my breathing.     OBJECTIVE DATA SUMMARY:   HISTORY:    Past Medical History:   Diagnosis Date    Arthritis     was in legs    Hypertension     Other ill-defined conditions(799.89)     extremety vascular disease    PAD (peripheral artery disease) ([facility]     PVD (peripheral vascular disease) ([facility]      Past Surgical History:   Procedure Laterality Date    COLONOSCOPY N/A 11/7/2018    COLONOSCOPY performed by Frederick Escudero MD at [facility]    HX AMPUTATION  2007    Right Above Knee Amputation    HX AMPUTATION  6/2011    Left Below Knee Amputation    HX ORTHOPAEDIC  7/17/12     Debridement Left BKA Stump and Placement of Wound [facility]    HX OTHER SURGICAL  10/24/12    SPLIT THICKNESS SKIN GRAFT FROM RIGHT THIGH TO BELOW KNEE AMPUTATION STUMP (to wound left BKA stump    UT VEIN BYPASS GRAFT,FEM-TIBIAL  3/04/2011    Left femoral post. tibial bypass with vein       Prior level of function: independent sliding transfers bed to wheelchair and independent self propelling wheelchair. Personal factors and/or comorbidities impacting plan of care: CHF    Home Situation  Home Environment: Private residence  Wheelchair Ramp: Yes  One/Two Story Residence: One story  Living Alone: No  Support Systems: Spouse/Significant Other/Partner, Child(angi), Family member(s)  Patient Expects to be Discharged to[de-identified] Private residence  Current DME Used/Available at Home: Walker, rolling, Grab bars, Shower chair, Wheelchair    EXAMINATION/PRESENTATION/DECISION MAKING:   Critical Behavior:  Neurologic State: Drowsy  Orientation Level: Oriented X4  Cognition: Decreased attention/concentration, Appropriate decision making, Appropriate safety awareness, Follows commands  Safety/Judgement: Awareness of environment, Good awareness of safety precautions, Decreased insight into deficits, Fall prevention  Hearing:   Auditory  Auditory Impairment: None  Skin:  No findings  Edema: mild B stumps  Range Of Motion:  AROM: Within functional limits           PROM: Within functional limits           Strength:    Strength: Generally decreased, functional                    Tone & Sensation:   Tone: Normal              Sensation: Intact               Coordination:  Coordination: Within functional limits  Vision:      Functional Mobility:  Bed Mobility:  Rolling: Independent  Supine to Sit: Independent     Scooting: Supervision  Transfers:              Bed to Chair: Stand-by assistance (T-transfer to recliner from bed)              Balance:   Sitting: Intact  Standing:  (R AKA; L BKA)  Ambulation/Gait Training:              Gait Description (WDL):  (N/A)                                   Therapeutic Exercises:   Slr; L knee flexion/extension; hip abd/add    Functional Measure:  Barthel Index:    Bathin  Bladder: 10  Bowels: 10  Groomin  Dressin  Feeding: 10  Mobility: 0  Stairs: 0  Toilet Use: 5  Transfer (Bed to Chair and Back): 10  Total: 55/100       The Barthel ADL Index: Guidelines  1. The index should be used as a record of what a patient does, not as a record of what a patient could do. 2. The main aim is to establish degree of independence from any help, physical or verbal, however minor and for whatever reason. 3. The need for supervision renders the patient not independent. 4. A patient's performance should be established using the best available evidence. Asking the patient, friends/relatives and nurses are the usual sources, but direct observation and common sense are also important. However direct testing is not needed. 5. Usually the patient's performance over the preceding 24-48 hours is important, but occasionally longer periods will be relevant. 6. Middle categories imply that the patient supplies over 50 per cent of the effort. 7. Use of aids to be independent is allowed. Jacek Joseph., Barthel, D.W. (1482). Functional evaluation: the Barthel Index. [facility] (14)2. PACO [place], Martha Henry., Arlena Cushing., [place], [address] [place] [address] (). Measuring the change indisability after inpatient rehabilitation; comparison of the responsiveness of the Barthel Index and Functional Carson Measure. Journal of Neurology, Neurosurgery, and Psychiatry, 66(4), 114-486. Paramjit Chavez, N.J.A, GITA Garg, & Isreal Smith M.A. (2004.) Assessment of post-stroke quality of life in cost-effectiveness studies:  The usefulness of the Barthel Index and the EuroQoL-5D. Quality of Life Research, 15, 240-38        Physical Therapy Evaluation Charge Determination   History Examination Presentation Decision-Making   HIGH Complexity :3+ comorbidities / personal factors will impact the outcome/ POC  MEDIUM Complexity : 3 Standardized tests and measures addressing body structure, function, activity limitation and / or participation in recreation  LOW Complexity : Stable, uncomplicated  Other outcome measures Barthel  MEDIUM      Based on the above components, the patient evaluation is determined to be of the following complexity level: LOW     Pain Rating:  None reported    Activity Tolerance:   Good and SpO2 stable on RA      After treatment patient left in no apparent distress:   Sitting in chair and Call bell within reach    COMMUNICATION/EDUCATION:   The patients plan of care was discussed with: Registered nurse. Fall prevention education was provided and the patient/caregiver indicated understanding., Patient/family have participated as able in goal setting and plan of care. and Patient/family agree to work toward stated goals and plan of care.     Thank you for this referral.  Pierce Killian, PT   Time Calculation: 28 mins

## 2021-05-19 NOTE — PROGRESS NOTES
Hospitalist Progress Note    NAME: Herrera Ronquillo   :  1945   MRN:  [id number]       Assessment / Plan:  Acute on chronic systolic heart failure  Mild troponin elevation may be demand ischemia  Acute bronchitis   CAD s/p stent placement  -BNP is elevated more than 35,000. Chest x-ray shows no acute process. Pt has crackles on admission.   -recent Echo showed EF  25-30%, EKG neg for acute ischemia. Trop 0.08-->0.07  -pt with congestion and cough. procalcitonin wnl. Cont' mucinex and doxycycline. Repeat probnp today  -cont' coreg. Hold lasix and Entresto due to rising cr.  diuresed net neg 1.3L  -Strict I's and O's, daily weights and low-salt diet  -currently stable on RA, possibly home tomorrow if cr improves  -cardiology consultation appreciated     Elevated cr  Cr at 1.4 today, holding lasix and entresto as abov  Repeat bmp in the AM.    History of coronary artery disease s/p PCI  History of peripheral arterial disease s/p bilateral lower extremity amputations  COPD not in exacerbation  -Continue home aspirin, atorvastatin, Coreg, Plavix       Code Status: Full code  Surrogate Decision Maker:  Jonny   DVT Prophylaxis: Lovenox  GI Prophylaxis: not indicated   Baseline: From home, lives with daughter-in-law, has bilateral lower extremity amputations     Subjective:     Chief Complaint / Reason for Physician Visit  Pt awake, up in bed, NAD. Edema has improved. Discussed with RN events overnight. Review of Systems:  Symptom Y/N Comments  Symptom Y/N Comments   Fever/Chills    Chest Pain n    Poor Appetite    Edema     Cough    Abdominal Pain     Sputum    Joint Pain     SOB/BRINK n   Pruritis/Rash     Nausea/vomit    Tolerating PT/OT     Diarrhea    Tolerating Diet     Constipation    Other       Could NOT obtain due to:      Objective:     VITALS:   Last 24hrs VS reviewed since prior progress note.  Most recent are:  Patient Vitals for the past 24 hrs:   Temp Pulse Resp BP SpO2 05/19/21 0822 97.5 °F (36.4 °C) 82 20 (!) 155/96 100 %   05/19/21 0300 97.4 °F (36.3 °C) (!) 59 18 (!) 156/87 94 %   05/18/21 2129 97.5 °F (36.4 °C) 74 20 (!) 165/92 95 %   05/18/21 1543 97.8 °F (36.6 °C) 85 20 (!) 158/89 94 %   05/18/21 1136 98.6 °F (37 °C) (!) 53 20 (!) 136/93 98 %       Intake/Output Summary (Last 24 hours) at 5/19/2021 1013  Last data filed at 5/19/2021 0912  Gross per 24 hour   Intake 400 ml   Output 900 ml   Net -500 ml        I had a face to face encounter and independently examined this patient on 5/19/2021, as outlined below:  PHYSICAL EXAM:  General: WD, WN. Alert, cooperative, no acute distress    EENT:  EOMI. Anicteric sclerae. MMM  Resp:  Coarse bs b/l.  no wheezing or rales. No accessory muscle use  CV:  Regular  rhythm,  No edema  GI:  Soft, Non distended, Non tender. +BS  Neurologic:  Alert and oriented X 3, normal speech  Psych:   Good insight. Not anxious nor agitated  Skin:  No rashes. No jaundice    Reviewed most current lab test results and cultures  YES  Reviewed most current radiology test results   YES  Review and summation of old records today    NO  Reviewed patient's current orders and MAR    YES  PMH/SH reviewed - no change compared to H&P  ________________________________________________________________________  Care Plan discussed with:    Comments   Patient x    Family      RN x    Care Manager     Consultant                        Multidiciplinary team rounds were held today with , nursing, pharmacist and clinical coordinator. Patient's plan of care was discussed; medications were reviewed and discharge planning was addressed.      ________________________________________________________________________  Total NON critical care TIME: 35  Minutes    Total CRITICAL CARE TIME Spent:   Minutes non procedure based      Comments   >50% of visit spent in counseling and coordination of care ________________________________________________________________________  Jeannie Goldstein MD     Procedures: see electronic medical records for all procedures/Xrays and details which were not copied into this note but were reviewed prior to creation of Plan. LABS:  I reviewed today's most current labs and imaging studies.   Pertinent labs include:  Recent Labs     05/17/21  0308   WBC 5.3   HGB 11.3*   HCT 34.8*   *     Recent Labs     05/19/21  0503 05/18/21  0244 05/17/21  0308    136 137   K 4.1 4.2 4.0   CL 98 99 101   CO2 30 31 31   GLU 80 85 87   BUN 42* 32* 30*   CREA 1.40* 1.16* 1.19*   CA 8.9 9.1 9.2       Signed: Jeannie Goldstein MD

## 2021-05-19 NOTE — PROGRESS NOTES
Problem: Falls - Risk of  Goal: *Absence of Falls  Description: Document Ramón Randhawa Fall Risk and appropriate interventions in the flowsheet. Outcome: Progressing Towards Goal  Note: Fall Risk Interventions:  Mobility Interventions: Communicate number of staff needed for ambulation/transfer         Medication Interventions: Bed/chair exit alarm    Elimination Interventions: Call light in reach              Problem: Patient Education: Go to Patient Education Activity  Goal: Patient/Family Education  Outcome: Progressing Towards Goal     Problem: Pressure Injury - Risk of  Goal: *Prevention of pressure injury  Description: Document Samy Scale and appropriate interventions in the flowsheet.   Outcome: Progressing Towards Goal  Note: Pressure Injury Interventions:  Sensory Interventions: Assess changes in LOC    Moisture Interventions: Absorbent underpads, Internal/External urinary devices    Activity Interventions: Assess need for specialty bed    Mobility Interventions: HOB 30 degrees or less, Assess need for specialty bed    Nutrition Interventions: Document food/fluid/supplement intake    Friction and Shear Interventions: HOB 30 degrees or less, Lift sheet, Minimize layers                Problem: Patient Education: Go to Patient Education Activity  Goal: Patient/Family Education  Outcome: Progressing Towards Goal

## 2021-05-19 NOTE — PROGRESS NOTES
.Orders received, chart reviewed and patient evaluated by physical therapy. Pending progression with skilled acute physical therapy, recommend:  No skilled physical therapy/ follow up rehabilitation needs identified at this time. Has all needed DME at home. Recommend with nursing OOB to chair 2x/day and daily with cg assist doing T-transfer method. Thank you for completing as able in order to maintain patient strength, endurance and independence. Full evaluation to follow.      Kasie Sharp, PT

## 2021-05-19 NOTE — PROGRESS NOTES
0730-Report taken from [facility]. Patient resting in bed at this time. 1900-  End of Shift Note    Bedside shift change report given to [facility] (oncoming nurse) by Paulina Alarcon (offgoing nurse). Report included the following information SBAR, Kardex, Procedure Summary, Intake/Output, MAR and Recent Results    Shift worked:  7a-7p     Shift summary and any significant changes:     better appetite today. No pain. Cough improved with incentive     Concerns for physician to address:  d/c plan     Zone phone for oncoming shift:          Activity:  Activity Level: Bed Rest  Number times ambulated in hallways past shift: 0  Number of times OOB to chair past shift: 1    Cardiac:   Cardiac Monitoring: Yes      Cardiac Rhythm: Sinus Rhythm    Access:   Current line(s): PIV     Genitourinary:   Urinary status: voiding    Respiratory:   O2 Device: None (Room air)  Chronic home O2 use?: NO  Incentive spirometer at bedside: YES     GI:  Last Bowel Movement Date: 04/18/21  Current diet:  DIET CARDIAC Regular  DIET NUTRITIONAL SUPPLEMENTS Dinner, Lunch; Ensure Pudding  DIET NUTRITIONAL SUPPLEMENTS Breakfast; Ensure Compact  Passing flatus: YES  Tolerating current diet: YES       Pain Management:   Patient states pain is manageable on current regimen: YES    Skin:  Samy Score: 17  Interventions: speciality bed    Patient Safety:  Fall Score:  Total Score: 3  Interventions: assistive device (walker, cane, etc)  High Fall Risk: Yes    Length of Stay:  Expected LOS: 4d 0h  Actual LOS: 3503 [facility]

## 2021-05-19 NOTE — PROGRESS NOTES
Bedside and Verbal shift change report given to Rajni Killian RN (oncoming nurse) by LUCIE Alston (offgoing nurse). Report included the following information SBAR, Kardex, Intake/Output, MAR, Accordion, Recent Results and Med Rec Status.

## 2021-05-19 NOTE — PROGRESS NOTES
Progress Note      5/19/2021 10:48 AM  NAME: Heavenly Galan   MRN:  [id number]   Admit Diagnosis: Acute exacerbation of CHF (congestive heart failure) ([facility] [I50.9]     Assessment:     Acute on chronic systolic chf         Pro BNP > 35,000     - Anterior MI with multivessel stenting 5/2020  - ICM EF 25% Echo 3/2021  - Sinus with poor R wave  - HTN    Inadequately controlled. - dyslpidemia  - CKD  - PvD s/p bilateral BKA      , uses a wheelchair at home            Plan:     continue with diuresis. Titrate heart failure meds. 5/19 with rising creatinine Entresto was put on hold today . [x]        High complexity decision making was performed    Subjective:     Heavenly Galan denies chest pain, dyspnea. Discussed with RN events overnight. Patient Active Problem List   Diagnosis Code    HTN (hypertension) I10    Depressive disorder, not elsewhere classified F32.9    PVD (peripheral vascular disease) ([facility] I73.9    Nausea with vomiting R11.2    HTN (hypertension) I10    Chronic hyponatremia E87.1    Sinus bradycardia R00.1    MRSA infection within last 3 months Z86.14    Status post skin graft Z94.5    STEMI (ST elevation myocardial infarction) ([facility] I21.3    STEMI involving left anterior descending coronary artery (HCC) I21.02    Anorexia R63.0    Abdominal pain R10.9    CHF exacerbation (HCC) I50.9    Acute exacerbation of CHF (congestive heart failure) ([facility]) I50.9       Review of Systems:    Symptom Y/N Comments  Symptom Y/N Comments   Fever/Chills N   Chest Pain N    Poor Appetite N   Edema N    Cough N   Abdominal Pain N    Sputum N   Joint Pain N    SOB/BRINK N   Pruritis/Rash N    Nausea/vomit N   Tolerating PT/OT Y    Diarrhea N   Tolerating Diet Y    Constipation N   Other       Could NOT obtain due to:      Objective:      Physical Exam:    Last 24hrs VS reviewed since prior progress note.  Most recent are:    Visit Vitals  BP (!) 123/95 (BP 1 Location: Left arm, BP Patient Position: Sitting)   Pulse 68   Temp 98.9 °F (37.2 °C)   Resp 20   Ht 5' 2\" (1.575 m)   Wt 72.6 kg (160 lb 1.6 oz)   LMP  (LMP Unknown)   SpO2 97%   BMI 29.28 kg/m²       Intake/Output Summary (Last 24 hours) at 5/19/2021 1445  Last data filed at 5/19/2021 0912  Gross per 24 hour   Intake 300 ml   Output 900 ml   Net -600 ml        General Appearance: Well developed, well nourished, alert & oriented x 3,    no acute distress. Ears/Nose/Mouth/Throat: Hearing grossly normal.  Neck: Supple. Chest: Lungs clear to auscultation bilaterally. Cardiovascular: Regular rate and rhythm, S1S2 normal, no murmur. Abdomen: Soft, non-tender, bowel sounds are active. Extremities: No edema bilaterally. Skin: Warm and dry. PMH/SH reviewed - no change compared to H&P    Data Review    Telemetry: normal sinus rhythm     Lab Data Personally Reviewed:    Recent Labs     05/17/21  0308   WBC 5.3   HGB 11.3*   HCT 34.8*   *   LABRCNT(INR:3,PTP:3,APTT:3,)  Recent Labs     05/19/21  0503 05/18/21  0244 05/17/21  0308    136 137   K 4.1 4.2 4.0   CL 98 99 101   CO2 30 31 31   BUN 42* 32* 30*   CREA 1.40* 1.16* 1.19*   GLU 80 85 87   CA 8.9 9.1 9.2   LABRCNT(CPK:3,CpKMB:3,ckndx:3,troiq:3)  Lab Results   Component Value Date/Time    Cholesterol, total 144 08/19/2012 04:00 AM    HDL Cholesterol 43 08/19/2012 04:00 AM    LDL, calculated 86.6 08/19/2012 04:00 AM    Triglyceride 72 08/19/2012 04:00 AM    CHOL/HDL Ratio 3.3 08/19/2012 04:00 AM   LABRCNT(sgot:3,gpt:3,ap:3,tbiL:3,TP:3,ALB:3,GLOB:3,ggt:3,aml:3,amyp:3,lpse:3,hlpse:3)No results for input(s): PH, PCO2, PO2 in the last 72 hours.   Lab Results   Component Value Date/Time    Cholesterol, total 144 08/19/2012 04:00 AM    HDL Cholesterol 43 08/19/2012 04:00 AM    LDL, calculated 86.6 08/19/2012 04:00 AM    Triglyceride 72 08/19/2012 04:00 AM    CHOL/HDL Ratio 3.3 08/19/2012 04:00 AM   MEDTABLETimkendell Ramirez MD  No results for input(s): PH, PCO2, PO2 in the last 72 hours.     Medications Personally Reviewed:    Current Facility-Administered Medications   Medication Dose Route Frequency    [Held by provider] sacubitriL-valsartan (ENTRESTO) 49-51 mg tablet 1 Tab  1 Tablet Oral Q12H    hydrALAZINE (APRESOLINE) 20 mg/mL injection 10 mg  10 mg IntraVENous Q4H PRN    doxycycline (VIBRA-TABS) tablet 100 mg  100 mg Oral Q12H    guaiFENesin ER (MUCINEX) tablet 600 mg  600 mg Oral Q12H    aspirin delayed-release tablet 81 mg  81 mg Oral DAILY    atorvastatin (LIPITOR) tablet 40 mg  40 mg Oral DAILY    carvediloL (COREG) tablet 12.5 mg  12.5 mg Oral BID WITH MEALS    clopidogreL (PLAVIX) tablet 75 mg  75 mg Oral DAILY    potassium chloride SR (KLOR-CON 10) tablet 10 mEq  10 mEq Oral DAILY    sodium chloride (NS) flush 5-40 mL  5-40 mL IntraVENous Q8H    sodium chloride (NS) flush 5-40 mL  5-40 mL IntraVENous PRN    acetaminophen (TYLENOL) tablet 650 mg  650 mg Oral Q6H PRN    Or    acetaminophen (TYLENOL) suppository 650 mg  650 mg Rectal Q6H PRN    polyethylene glycol (MIRALAX) packet 17 g  17 g Oral DAILY PRN    promethazine (PHENERGAN) tablet 12.5 mg  12.5 mg Oral Q6H PRN    Or    ondansetron (ZOFRAN) injection 4 mg  4 mg IntraVENous Q6H PRN    enoxaparin (LOVENOX) injection 40 mg  40 mg SubCUTAneous DAILY         Jeannie Beck MD

## 2021-05-20 LAB
ANION GAP SERPL CALC-SCNC: 7 MMOL/L (ref 5–15)
BNP SERPL-MCNC: ABNORMAL PG/ML
BUN SERPL-MCNC: 48 MG/DL (ref 6–20)
BUN/CREAT SERPL: 37 (ref 12–20)
CALCIUM SERPL-MCNC: 9.2 MG/DL (ref 8.5–10.1)
CHLORIDE SERPL-SCNC: 101 MMOL/L (ref 97–108)
CO2 SERPL-SCNC: 27 MMOL/L (ref 21–32)
CREAT SERPL-MCNC: 1.31 MG/DL (ref 0.55–1.02)
GLUCOSE SERPL-MCNC: 85 MG/DL (ref 65–100)
POTASSIUM SERPL-SCNC: 4.4 MMOL/L (ref 3.5–5.1)
SODIUM SERPL-SCNC: 135 MMOL/L (ref 136–145)

## 2021-05-20 PROCEDURE — 80048 BASIC METABOLIC PNL TOTAL CA: CPT

## 2021-05-20 PROCEDURE — 74011250637 HC RX REV CODE- 250/637: Performed by: INTERNAL MEDICINE

## 2021-05-20 PROCEDURE — 74011250636 HC RX REV CODE- 250/636: Performed by: INTERNAL MEDICINE

## 2021-05-20 PROCEDURE — 2709999900 HC NON-CHARGEABLE SUPPLY

## 2021-05-20 PROCEDURE — 65660000000 HC RM CCU STEPDOWN

## 2021-05-20 PROCEDURE — 77030038269 HC DRN EXT URIN PURWCK BARD -A

## 2021-05-20 PROCEDURE — 83880 ASSAY OF NATRIURETIC PEPTIDE: CPT

## 2021-05-20 PROCEDURE — 36415 COLL VENOUS BLD VENIPUNCTURE: CPT

## 2021-05-20 RX ORDER — DOXYCYCLINE HYCLATE 100 MG
100 TABLET ORAL EVERY 12 HOURS
Qty: 2 TABLET | Refills: 0 | Status: SHIPPED | OUTPATIENT
Start: 2021-05-20 | End: 2021-05-22

## 2021-05-20 RX ORDER — FUROSEMIDE 40 MG/1
40 TABLET ORAL 2 TIMES DAILY
Qty: 60 TABLET | Refills: 0 | Status: SHIPPED | OUTPATIENT
Start: 2021-05-20 | End: 2021-05-22 | Stop reason: SDUPTHER

## 2021-05-20 RX ADMIN — POTASSIUM CHLORIDE 10 MEQ: 750 TABLET, FILM COATED, EXTENDED RELEASE ORAL at 11:05

## 2021-05-20 RX ADMIN — GUAIFENESIN 600 MG: 600 TABLET, EXTENDED RELEASE ORAL at 11:04

## 2021-05-20 RX ADMIN — CARVEDILOL 12.5 MG: 12.5 TABLET, FILM COATED ORAL at 11:04

## 2021-05-20 RX ADMIN — ONDANSETRON 4 MG: 2 INJECTION INTRAMUSCULAR; INTRAVENOUS at 11:13

## 2021-05-20 RX ADMIN — Medication 10 ML: at 16:39

## 2021-05-20 RX ADMIN — DOXYCYCLINE HYCLATE 100 MG: 100 TABLET, COATED ORAL at 11:04

## 2021-05-20 RX ADMIN — ENOXAPARIN SODIUM 40 MG: 40 INJECTION SUBCUTANEOUS at 11:04

## 2021-05-20 RX ADMIN — CARVEDILOL 12.5 MG: 12.5 TABLET, FILM COATED ORAL at 16:39

## 2021-05-20 RX ADMIN — CLOPIDOGREL BISULFATE 75 MG: 75 TABLET ORAL at 11:05

## 2021-05-20 RX ADMIN — ASPIRIN 81 MG: 81 TABLET, COATED ORAL at 11:03

## 2021-05-20 RX ADMIN — ATORVASTATIN CALCIUM 40 MG: 40 TABLET, FILM COATED ORAL at 11:05

## 2021-05-20 RX ADMIN — GUAIFENESIN 600 MG: 600 TABLET, EXTENDED RELEASE ORAL at 21:24

## 2021-05-20 RX ADMIN — DOXYCYCLINE HYCLATE 100 MG: 100 TABLET, COATED ORAL at 21:24

## 2021-05-20 RX ADMIN — ACETAMINOPHEN 650 MG: 325 TABLET ORAL at 11:03

## 2021-05-20 RX ADMIN — Medication 10 ML: at 11:05

## 2021-05-20 NOTE — PROGRESS NOTES
Spiritual Care Assessment/Progress Note  [facility]      NAME: Driss Pierre      MRN: [id number]  AGE: 68 y.o. SEX: female  Restorationist Affiliation: Church   Language: English     5/20/2021     Total Time (in minutes): 5     Spiritual Assessment begun in [facility] through conversation with:         []Patient        [] Family    [] Friend(s)        Reason for Consult: Initial/Spiritual assessment, patient floor     Spiritual beliefs: (Please include comment if needed)     [] Identifies with a pepito tradition:         [] Supported by a pepito community:            [] Claims no spiritual orientation:           [] Seeking spiritual identity:                [] Adheres to an individual form of spirituality:           [x] Not able to assess:                           Identified resources for coping:      [] Prayer                               [] Music                  [] Guided Imagery     [] Family/friends                 [] Pet visits     [] Devotional reading                         [x] Unknown     [] Other:                                              Interventions offered during this visit: (See comments for more details)    Patient Interventions: Initial visit           Plan of Care:     [] Support spiritual and/or cultural needs    [] Support AMD and/or advance care planning process      [] Support grieving process   [] Coordinate Rites and/or Rituals    [] Coordination with community clergy   [] No spiritual needs identified at this time   [] Detailed Plan of Care below (See Comments)  [] Make referral to Music Therapy  [] Make referral to Pet Therapy     [] Make referral to Addiction services  [] Make referral to [facility]  [] Make referral to Spiritual Care Partner  [] No future visits requested        [x] Follow up upon further referrals     Comments:     Initial Spiritual Care Assessment attempts for Ms. Kika Chambers in 3214. Performed chart review before the visit.  Despite multiple visit attempts, patient was appeared sleeping.  respected patient's privacy. Contact  if emotional/spiritual needs arise.      [id number] [facility] Irais Verde., M.S., Th.M.  Spiritual Care Provider   Paging Service [phone])

## 2021-05-20 NOTE — PROGRESS NOTES
End of Shift Note    Bedside shift change report given to Massiel   (oncoming nurse) by Rashi Turner RN (offgoing nurse). Report included the following information SBAR    Shift worked:  3-7     Shift summary and any significant changes:          Concerns for physician to address:       Zone phone for oncoming shift:          Activity:  Activity Level: Bed Rest  Number times ambulated in hallways past shift: 0  Number of times OOB to chair past shift: 0    Cardiac:   Cardiac Monitoring: No      Cardiac Rhythm: Sinus Mouna Six, Sinus Rhythm    Access:   Current line(s): PIV     Genitourinary:   Urinary status: external catheter    Respiratory:   O2 Device: None (Room air)  Chronic home O2 use?: NO  Incentive spirometer at bedside: NO     GI:  Last Bowel Movement Date: 05/20/21  Current diet:  DIET CARDIAC Regular  DIET NUTRITIONAL SUPPLEMENTS Dinner, Lunch; Ensure Pudding  DIET NUTRITIONAL SUPPLEMENTS Breakfast; Ensure Compact  Passing flatus: NO  Tolerating current diet: NO       Pain Management:   Patient states pain is manageable on current regimen: N/A    Skin:  Samy Score: 17  Interventions: internal/external urinary devices    Patient Safety:  Fall Score:  Total Score: 3  Interventions: bed/chair alarm  High Fall Risk: Yes    Length of Stay:  Expected LOS: 4d 0h  Actual LOS: [address] [place], RN

## 2021-05-20 NOTE — PROGRESS NOTES
Hospitalist Progress Note    NAME: sAif Murphy   :  1945   MRN:  [id number]       Assessment / Plan:  Acute on chronic systolic heart failure  Mild troponin elevation may be demand ischemia  Acute bronchitis   CAD s/p stent placement  -BNP is elevated more than 35,000. Chest x-ray shows no acute process. Pt has crackles on admission.   -recent Echo showed EF  25-30%, EKG neg for acute ischemia. Trop 0.08-->0.07  -pt with congestion and cough. procalcitonin wnl. Cont' mucinex and doxycycline. Repeat probnp today  -cont' coreg. Hold lasix and Entresto due to rising cr.  diuresed net neg 1.3L  -Strict I's and O's, daily weights and low-salt diet  -currently stable on RA, possibly home tomorrow if cr improves  -cardiology consultation appreciated     Elevated cr  Cr at 1.4 today, holding lasix and entresto as above  Repeat bmp tomorrow, if cr improving, can likely dc. Daughter in law was updated on plan of care at bedside. History of coronary artery disease s/p PCI  History of peripheral arterial disease s/p bilateral lower extremity amputations  COPD not in exacerbation  -Continue home aspirin, atorvastatin, Coreg, Plavix       Code Status: Full code  Surrogate Decision Maker:  Jonny   DVT Prophylaxis: Lovenox  GI Prophylaxis: not indicated   Baseline: From home, lives with daughter-in-law, has bilateral lower extremity amputations     Subjective:     Chief Complaint / Reason for Physician Visit  Pt awake, up in bed, NAD. Discussed with RN events overnight. Review of Systems:  Symptom Y/N Comments  Symptom Y/N Comments   Fever/Chills    Chest Pain n    Poor Appetite    Edema     Cough    Abdominal Pain     Sputum    Joint Pain     SOB/BRINK n   Pruritis/Rash     Nausea/vomit    Tolerating PT/OT     Diarrhea    Tolerating Diet     Constipation    Other       Could NOT obtain due to:      Objective:     VITALS:   Last 24hrs VS reviewed since prior progress note.  Most recent are:  Patient Vitals for the past 24 hrs:   Temp Pulse Resp BP SpO2   05/20/21 1135 98.6 °F (37 °C) 72 20 106/69 91 %   05/20/21 0811 97.5 °F (36.4 °C) 74 20 (!) 151/91 90 %   05/20/21 0340 97.3 °F (36.3 °C) 72 19 (!) 151/89 95 %   05/19/21 2338 97.9 °F (36.6 °C) 65 20 (!) 162/91 96 %   05/19/21 2034 97.5 °F (36.4 °C) 73 20 (!) 143/80 100 %   05/19/21 1557 97.3 °F (36.3 °C) 65 18 (!) 154/75 98 %       Intake/Output Summary (Last 24 hours) at 5/20/2021 1414  Last data filed at 5/19/2021 1745  Gross per 24 hour   Intake 300 ml   Output    Net 300 ml        I had a face to face encounter and independently examined this patient on 5/20/2021, as outlined below:  PHYSICAL EXAM:  General: WD, WN. Alert, cooperative, no acute distress    EENT:  EOMI. Anicteric sclerae. MMM  Resp:  Coarse bs b/l.  no wheezing or rales. No accessory muscle use  CV:  Regular  rhythm,  No edema  GI:  Soft, Non distended, Non tender. +BS  Neurologic:  Alert and oriented X 3, normal speech  Psych:   Good insight. Not anxious nor agitated  Skin:  No rashes. No jaundice    Reviewed most current lab test results and cultures  YES  Reviewed most current radiology test results   YES  Review and summation of old records today    NO  Reviewed patient's current orders and MAR    YES  PMH/SH reviewed - no change compared to H&P  ________________________________________________________________________  Care Plan discussed with:    Comments   Patient x    Family      RN x    Care Manager     Consultant                        Multidiciplinary team rounds were held today with , nursing, pharmacist and clinical coordinator. Patient's plan of care was discussed; medications were reviewed and discharge planning was addressed.      ________________________________________________________________________  Total NON critical care TIME: 35  Minutes    Total CRITICAL CARE TIME Spent:   Minutes non procedure based      Comments   >50% of visit spent in counseling and coordination of care     ________________________________________________________________________  Jeannie Goldstein MD     Procedures: see electronic medical records for all procedures/Xrays and details which were not copied into this note but were reviewed prior to creation of Plan. LABS:  I reviewed today's most current labs and imaging studies. Pertinent labs include:  No results for input(s): WBC, HGB, HCT, PLT, HGBEXT, HCTEXT, PLTEXT, HGBEXT, HCTEXT, PLTEXT in the last 72 hours.   Recent Labs     05/20/21  0839 05/19/21  0503 05/18/21  0244   * 136 136   K 4.4 4.1 4.2    98 99   CO2 27 30 31   GLU 85 80 85   BUN 48* 42* 32*   CREA 1.31* 1.40* 1.16*   CA 9.2 8.9 9.1       Signed: Jeannie Goldstein MD

## 2021-05-20 NOTE — PROGRESS NOTES
Bedside and Verbal shift change report given to [address] [place] [address] (oncoming nurse) by Emmanuel Johnson (offgoing nurse). Report included the following information SBAR, Kardex, Intake/Output, MAR and Recent Results.

## 2021-05-20 NOTE — DISCHARGE SUMMARY
Discharge Summary      Name: Ofelia Hu  [id number]  YOB: 1945 (Age: 68 y.o.)   Date of Admission: 5/16/2021  Date of Discharge: 5/22/2021  Attending Physician: Atul Ruelas MD    Discharge Diagnosis:   Acute on chronic systolic heart failure  Mild troponin elevation may be demand ischemia  Acute bronchitis   CAD s/p stent placement  History of coronary artery disease s/p PCI  History of peripheral arterial disease s/p bilateral lower extremity amputations  COPD not in exacerbation    Consultations:  IP CONSULT TO HOSPITALIST  IP CONSULT TO CARDIOLOGY      Brief Admission History/Reason for Admission Per Juliette Bailey MD:   Maria Elena Irene is a 68 y.o. female who presents with past medical history of congestive heart failure, COPD, coronary artery disease, peripheral arterial disease coming the hospital chief complaints of shortness of breath, generalized weakness. Patient reports that being revealed significantly about 3 days ago when she felt weakness involving all her extremities. She also reported some exertional shortness of breath with mild cough and wheezing. Does not report any fever or chills. Does not report any chest pain. Denies abdominal pain, nausea or vomiting. She reports being compliant with her medications.     On arrival to ED, vital signs are within normal limits except for slightly elevated blood pressure of 160/89. On labs CBC was normal.  BMP showed a creatinine of 1.04. LFTs were normal.  proBNP was 35,000. Chest x-ray shows no acute changes.     We were asked to admit for work up and evaluation of the above problems.      Brief Hospital Course by Main Problems:   Acute on chronic systolic heart failure  Mild troponin elevation may be demand ischemia  Acute bronchitis   CAD s/p stent placement  History of coronary artery disease s/p PCI  History of peripheral arterial disease s/p bilateral lower extremity amputations  BNP is elevated more than 35,000. Beau Mora x-ray shows no acute process. Pt has crackles on admission. Recent Echo showed EF  25-30%, EKG neg for acute ischemia. Trop 0.08-->0. 07. With also had congestion and cough on physical exam.  She was started on IV lasix, doxycycline and muccinex. Pt did not required O2 suppl. She feels better, lower exts edema and lung exam are improved with no crackles nor edema. Pt feels well today, her labs are stable. Pt completed doxycline inpt. .  Discussed with her cardiologist Dr Luis Antonio Rivera who recommended to discharge on lasix 40mg daily and increased dose of Entresto. Pt to cont' ASA, coreg, plavix and statin. She will need to f/u with PCP in 3 days for repeat labs. Outpt f/u with cardiology in 2 weeks. Elevated cr, cr 1.4 likely due to IV lasix. Cr trending down today. COPD not in exacerbation  Continue home aspirin, atorvastatin, Coreg, Plavix          Discharge Exam:  Patient seen and examined by me on discharge day. Pertinent Findings:  Visit Vitals  BP (!) 107/55   Pulse 60   Temp 97.6 °F (36.4 °C)   Resp 18   Ht 5' 2\" (1.575 m)   Wt 73.1 kg (161 lb 3.2 oz)   LMP  (LMP Unknown)   SpO2 100%   BMI 29.48 kg/m²     Gen:    Not in distress  Chest: Clear lungs  CVS:   Regular rhythm. No edema  Abd:  Soft, not distended, not tender    Discharge/Recent Laboratory Results:  Recent Labs     05/22/21  0338   *   K 4.3   CL 99   CO2 33*   BUN 50*   GLU 72   CA 8.1*     No results for input(s): HGB, HCT, WBC, PLT, HGBEXT, HCTEXT, PLTEXT, HGBEXT, HCTEXT, PLTEXT in the last 72 hours. Discharge Medications:  Current Discharge Medication List      START taking these medications    Details   sacubitril-valsartan (ENTRESTO) 49 mg/51 mg tablet Take 1 Tablet by mouth every twelve (12) hours.   Qty: 30 Tablet, Refills: 0  Start date: 5/22/2021         CONTINUE these medications which have CHANGED    Details   carvediloL (Coreg) 12.5 mg tablet Take 0.5 Tablets by mouth two (2) times daily (with meals). Qty: 60 Tablet, Refills: 0  Start date: 5/22/2021      furosemide (LASIX) 40 mg tablet Take 1 Tablet by mouth daily. Qty: 30 Tablet, Refills: 0  Start date: 5/22/2021         CONTINUE these medications which have NOT CHANGED    Details   potassium chloride (K-DUR, KLOR-CON) 20 mEq tablet Take 0.5 Tabs by mouth daily. Qty: 60 Tab, Refills: 0      aspirin delayed-release 81 mg tablet Take  by mouth daily. clopidogreL (PLAVIX) 75 mg tab Take 1 Tab by mouth daily. Qty: 30 Tab, Refills: 12      gabapentin (NEURONTIN) 300 mg capsule Take 300 mg by mouth as needed for Pain. atorvastatin (Lipitor) 40 mg tablet Take 40 mg by mouth daily. STOP taking these medications       sacubitriL-valsartan (Entresto) 24-26 mg tablet Comments:   Reason for Stopping:               Patient Follow Up Instructions:    Activity: Activity as tolerated  Diet: Cardiac Diet  Wound Care: None needed  Code: Full    DISPOSITION:    Home with Family: x   Home with HH/PT/OT/RN:    SNF/LTC:    [place]:    OTHER:          Follow up with:   PCP : Amber Ch MD  Follow-up Information     Follow up With Specialties Details Why Naseem Johnson MD Family Medicine On 5/26/2021 For hospital follow up appointment at 4:15PM [address] [place] [address]  [address] [facility] 30      Srikanth Brown MD Cardiology In 1 week  [address]  [address]  [address] [phone]              Total time in minutes spent coordinating this discharge (includes going over instructions, follow-up, prescriptions, and preparing report for sign off to her PCP) :  35 minutes

## 2021-05-20 NOTE — PROGRESS NOTES
Pt ready for discharge from a CM standpoint. Transition of Care Plan:    RUR: 17% low  Disposition: Home with follow up appointments  Follow up appointments: PCP scheduled and placed on AVS; Cardiology  DME needed: Pt has all DME needed, requested new wheelchair and rolling walker from [employer] to be delivered to her home after discharge  Transportation at Discharge: Spouse will transport  Soy Roldan or means to access home: Spouse has access to home    IM Medicare letter:2nd IMM letter given to patient, signed and copy placed on medical record  Caregiver Contact: Spouse, Eduardo De León  Discharge Caregiver contacted prior to discharge? Pt to inform family about discharge plan     CM has sent referral to [employer] for new wheelchair and bedside commode, as patient has reported that he current wheelchair is very old and falling apart. [employer] is running insurance benefits and if patient qualifies for wheelchair, they will have one delivered to her home after discharge. Spouse to transport home when ready for discharge. Care Management Interventions  PCP Verified by CM: Yes (2 weeks ago)  Mode of Transport at Discharge: Other (see comment) (Spouse will transport)  Current Support Network: Lives with Spouse, Own Home  Confirm Follow Up Transport: Family  Discharge Location  Discharge Placement: [address] [facility] [place], [address] - [place] [facility]  [employer] ACP Facilitator  Zone Phone: [phone]

## 2021-05-21 LAB
ANION GAP SERPL CALC-SCNC: 3 MMOL/L (ref 5–15)
BUN SERPL-MCNC: 50 MG/DL (ref 6–20)
BUN/CREAT SERPL: 36 (ref 12–20)
CALCIUM SERPL-MCNC: 8.6 MG/DL (ref 8.5–10.1)
CHLORIDE SERPL-SCNC: 100 MMOL/L (ref 97–108)
CO2 SERPL-SCNC: 34 MMOL/L (ref 21–32)
CREAT SERPL-MCNC: 1.39 MG/DL (ref 0.55–1.02)
GLUCOSE SERPL-MCNC: 77 MG/DL (ref 65–100)
POTASSIUM SERPL-SCNC: 4.2 MMOL/L (ref 3.5–5.1)
SODIUM SERPL-SCNC: 137 MMOL/L (ref 136–145)

## 2021-05-21 PROCEDURE — 74011250636 HC RX REV CODE- 250/636: Performed by: INTERNAL MEDICINE

## 2021-05-21 PROCEDURE — 77010033678 HC OXYGEN DAILY

## 2021-05-21 PROCEDURE — 65660000000 HC RM CCU STEPDOWN

## 2021-05-21 PROCEDURE — 80048 BASIC METABOLIC PNL TOTAL CA: CPT

## 2021-05-21 PROCEDURE — 94760 N-INVAS EAR/PLS OXIMETRY 1: CPT

## 2021-05-21 PROCEDURE — 74011250637 HC RX REV CODE- 250/637: Performed by: INTERNAL MEDICINE

## 2021-05-21 PROCEDURE — 36415 COLL VENOUS BLD VENIPUNCTURE: CPT

## 2021-05-21 RX ADMIN — CLOPIDOGREL BISULFATE 75 MG: 75 TABLET ORAL at 10:14

## 2021-05-21 RX ADMIN — POTASSIUM CHLORIDE 10 MEQ: 750 TABLET, FILM COATED, EXTENDED RELEASE ORAL at 10:13

## 2021-05-21 RX ADMIN — Medication 10 ML: at 21:37

## 2021-05-21 RX ADMIN — GUAIFENESIN 600 MG: 600 TABLET, EXTENDED RELEASE ORAL at 21:37

## 2021-05-21 RX ADMIN — ASPIRIN 81 MG: 81 TABLET, COATED ORAL at 10:13

## 2021-05-21 RX ADMIN — DOXYCYCLINE HYCLATE 100 MG: 100 TABLET, COATED ORAL at 21:37

## 2021-05-21 RX ADMIN — ENOXAPARIN SODIUM 40 MG: 40 INJECTION SUBCUTANEOUS at 10:12

## 2021-05-21 RX ADMIN — CARVEDILOL 12.5 MG: 12.5 TABLET, FILM COATED ORAL at 16:52

## 2021-05-21 RX ADMIN — GUAIFENESIN 600 MG: 600 TABLET, EXTENDED RELEASE ORAL at 10:13

## 2021-05-21 RX ADMIN — CARVEDILOL 12.5 MG: 12.5 TABLET, FILM COATED ORAL at 10:13

## 2021-05-21 RX ADMIN — DOXYCYCLINE HYCLATE 100 MG: 100 TABLET, COATED ORAL at 10:13

## 2021-05-21 RX ADMIN — ATORVASTATIN CALCIUM 40 MG: 40 TABLET, FILM COATED ORAL at 10:13

## 2021-05-21 RX ADMIN — Medication 10 ML: at 15:28

## 2021-05-21 RX ADMIN — Medication 10 ML: at 10:14

## 2021-05-21 NOTE — PROGRESS NOTES
Progress Note      5/21/2021 10:48 AM  NAME: Chuyita Palacios   MRN:  [id number]   Admit Diagnosis: Acute exacerbation of CHF (congestive heart failure) ([facility] [I50.9]     Assessment:     Acute on chronic systolic chf         Pro BNP > 35,000     - Anterior MI with multivessel stenting 5/2020  - ICM EF 25% Echo 3/2021  - Sinus with poor R wave  - HTN    Inadequately controlled. - dyslpidemia  - CKD  - PvD s/p bilateral BKA      , uses a wheelchair at home            Plan:     continue with diuresis. Titrate heart failure meds. 5/19 with rising creatinine Entresto was put on hold today . 5/21  Continue meds. Resume Entresto at  Increased dose when renal function allows. [x]        High complexity decision making was performed    Subjective:     Chuyita Ayazist denies chest pain, dyspnea. Discussed with RN events overnight.      Patient Active Problem List   Diagnosis Code    HTN (hypertension) I10    Depressive disorder, not elsewhere classified F32.9    PVD (peripheral vascular disease) ([facility] I73.9    Nausea with vomiting R11.2    HTN (hypertension) I10    Chronic hyponatremia E87.1    Sinus bradycardia R00.1    MRSA infection within last 3 months Z86.14    Status post skin graft Z94.5    STEMI (ST elevation myocardial infarction) ([facility] I21.3    STEMI involving left anterior descending coronary artery (HCC) I21.02    Anorexia R63.0    Abdominal pain R10.9    CHF exacerbation (HCC) I50.9    Acute exacerbation of CHF (congestive heart failure) ([facility]) I50.9       Review of Systems:    Symptom Y/N Comments  Symptom Y/N Comments   Fever/Chills N   Chest Pain N    Poor Appetite N   Edema N    Cough N   Abdominal Pain N    Sputum N   Joint Pain N    SOB/BRINK N   Pruritis/Rash N    Nausea/vomit N   Tolerating PT/OT Y    Diarrhea N   Tolerating Diet Y    Constipation N   Other       Could NOT obtain due to:      Objective:      Physical Exam:    Last 24hrs VS reviewed since prior progress note. Most recent are:    Visit Vitals  BP (!) 112/54 (BP 1 Location: Right arm, BP Patient Position: At rest)   Pulse (!) 56   Temp 97.6 °F (36.4 °C)   Resp 20   Ht 5' 2\" (1.575 m)   Wt 73.1 kg (161 lb 3.2 oz)   LMP  (LMP Unknown)   SpO2 94%   BMI 29.48 kg/m²       Intake/Output Summary (Last 24 hours) at 5/21/2021 1348  Last data filed at 5/21/2021 6244  Gross per 24 hour   Intake 780 ml   Output 401 ml   Net 379 ml        General Appearance: Well developed, well nourished, alert & oriented x 3,    no acute distress. Ears/Nose/Mouth/Throat: Hearing grossly normal.  Neck: Supple. Chest: Lungs clear to auscultation bilaterally. Cardiovascular: Regular rate and rhythm, S1S2 normal, no murmur. Abdomen: Soft, non-tender, bowel sounds are active. Extremities: No edema bilaterally. Skin: Warm and dry. PMH/SH reviewed - no change compared to H&P    Data Review    Telemetry: normal sinus rhythm     Lab Data Personally Reviewed:    No results for input(s): WBC, HGB, HCT, PLT, HGBEXT, HCTEXT, PLTEXT, HGBEXT, HCTEXT, PLTEXT in the last 72 hours. LABRCNT(INR:3,PTP:3,APTT:3,)  Recent Labs     05/21/21  0351 05/20/21  0839 05/19/21  0503    135* 136   K 4.2 4.4 4.1    101 98   CO2 34* 27 30   BUN 50* 48* 42*   CREA 1.39* 1.31* 1.40*   GLU 77 85 80   CA 8.6 9.2 8.9   LABRCNT(CPK:3,CpKMB:3,ckndx:3,troiq:3)  Lab Results   Component Value Date/Time    Cholesterol, total 144 08/19/2012 04:00 AM    HDL Cholesterol 43 08/19/2012 04:00 AM    LDL, calculated 86.6 08/19/2012 04:00 AM    Triglyceride 72 08/19/2012 04:00 AM    CHOL/HDL Ratio 3.3 08/19/2012 04:00 AM   LABRCNT(sgot:3,gpt:3,ap:3,tbiL:3,TP:3,ALB:3,GLOB:3,ggt:3,aml:3,amyp:3,lpse:3,hlpse:3)No results for input(s): PH, PCO2, PO2 in the last 72 hours.   Lab Results   Component Value Date/Time    Cholesterol, total 144 08/19/2012 04:00 AM    HDL Cholesterol 43 08/19/2012 04:00 AM    LDL, calculated 86.6 08/19/2012 04:00 AM    Triglyceride 72 08/19/2012 04:00 AM    CHOL/HDL Ratio 3.3 08/19/2012 04:00 AM   [facility] Clinic[facility] Telle[facility] MD  No results for input(s): PH, PCO2, PO2 in the last 72 hours.     Medications Personally Reviewed:    Current Facility-Administered Medications   Medication Dose Route Frequency    [Held by provider] sacubitriL-valsartan (ENTRESTO) 49-51 mg tablet 1 Tab  1 Tablet Oral Q12H    hydrALAZINE (APRESOLINE) 20 mg/mL injection 10 mg  10 mg IntraVENous Q4H PRN    doxycycline (VIBRA-TABS) tablet 100 mg  100 mg Oral Q12H    guaiFENesin ER (MUCINEX) tablet 600 mg  600 mg Oral Q12H    aspirin delayed-release tablet 81 mg  81 mg Oral DAILY    atorvastatin (LIPITOR) tablet 40 mg  40 mg Oral DAILY    carvediloL (COREG) tablet 12.5 mg  12.5 mg Oral BID WITH MEALS    clopidogreL (PLAVIX) tablet 75 mg  75 mg Oral DAILY    potassium chloride SR (KLOR-CON 10) tablet 10 mEq  10 mEq Oral DAILY    sodium chloride (NS) flush 5-40 mL  5-40 mL IntraVENous Q8H    sodium chloride (NS) flush 5-40 mL  5-40 mL IntraVENous PRN    acetaminophen (TYLENOL) tablet 650 mg  650 mg Oral Q6H PRN    Or    acetaminophen (TYLENOL) suppository 650 mg  650 mg Rectal Q6H PRN    polyethylene glycol (MIRALAX) packet 17 g  17 g Oral DAILY PRN    promethazine (PHENERGAN) tablet 12.5 mg  12.5 mg Oral Q6H PRN    Or    ondansetron (ZOFRAN) injection 4 mg  4 mg IntraVENous Q6H PRN    enoxaparin (LOVENOX) injection 40 mg  40 mg SubCUTAneous DAILY         Jeannie Beck MD

## 2021-05-21 NOTE — PROGRESS NOTES
Bedside and Verbal shift change report given to [place] (oncoming nurse) by Mercy Ahumada (offgoing nurse). Report included the following information SBAR, Kardex, Intake/Output, MAR, Recent Results, Med Rec Status and Cardiac Rhythm Sinus Grace Farmer.

## 2021-05-21 NOTE — PROGRESS NOTES
End of Shift Note    Bedside shift change report given to Cecy FAULKNER (oncoming nurse) by Herbert Garcia RN (offgoing nurse). Report included the following information SBAR, Kardex, Intake/Output, MAR, Recent Results and Cardiac Rhythm nsr/arnulfo    Shift worked: [id number]     Shift summary and any significant changes:          Concerns for physician to address:       Zone phone for oncoming shift:   [phone]       Activity:  Activity Level: Up with Assistance  Number times ambulated in hallways past shift: 0  Number of times OOB to chair past shift: 0    Cardiac:   Cardiac Monitoring: yes     Cardiac Rhythm: Sinus Rhythm, Sinus Arnulfo    Access:   Current line(s): PIV     Genitourinary:   Urinary status: voiding and external catheter    Respiratory:   O2 Device: Nasal cannula  Chronic home O2 use?: NO  Incentive spirometer at bedside: YES     GI:  Last Bowel Movement Date: 05/21/21  Current diet:  DIET CARDIAC Regular  DIET NUTRITIONAL SUPPLEMENTS Dinner, Lunch; Ensure Pudding  DIET NUTRITIONAL SUPPLEMENTS Breakfast; Ensure Compact  Passing flatus: YES  Tolerating current diet: YES       Pain Management:   Patient states pain is manageable on current regimen: YES    Skin:  Samy Score: 16  Interventions: float heels, increase time out of bed, foam dressing, PT/OT consult and limit briefs    Patient Safety:  Fall Score:  Total Score: 3  Interventions: assistive device (walker, cane, etc), gripper socks, pt to call before getting OOB, stay with me (per policy) and sitter at bedside   High Fall Risk: Yes    Length of Stay:  Expected LOS: 4d 0h  Actual LOS: 5      Aleyda Graham RN

## 2021-05-21 NOTE — PROGRESS NOTES
Problem: Falls - Risk of  Goal: *Absence of Falls  Description: Document Lukasz Rivera Fall Risk and appropriate interventions in the flowsheet. Outcome: Progressing Towards Goal  Note: Fall Risk Interventions:  Mobility Interventions: Bed/chair exit alarm, Communicate number of staff needed for ambulation/transfer    Mentation Interventions: Bed/chair exit alarm, Door open when patient unattended    Medication Interventions: Bed/chair exit alarm, Patient to call before getting OOB    Elimination Interventions: Call light in reach, Patient to call for help with toileting needs              Problem: Patient Education: Go to Patient Education Activity  Goal: Patient/Family Education  Outcome: Progressing Towards Goal     Problem: Pressure Injury - Risk of  Goal: *Prevention of pressure injury  Description: Document Samy Scale and appropriate interventions in the flowsheet.   Outcome: Progressing Towards Goal  Note: Pressure Injury Interventions:  Sensory Interventions: Assess changes in LOC    Moisture Interventions: Absorbent underpads    Activity Interventions: Assess need for specialty bed    Mobility Interventions: HOB 30 degrees or less    Nutrition Interventions: Document food/fluid/supplement intake    Friction and Shear Interventions: HOB 30 degrees or less                Problem: Patient Education: Go to Patient Education Activity  Goal: Patient/Family Education  Outcome: Progressing Towards Goal

## 2021-05-21 NOTE — PROGRESS NOTES
Problem: Falls - Risk of  Goal: *Absence of Falls  Description: Document Lianna Jeter Fall Risk and appropriate interventions in the flowsheet. Outcome: Progressing Towards Goal  Note: Fall Risk Interventions:  Mobility Interventions: Assess mobility with egress test, Communicate number of staff needed for ambulation/transfer, OT consult for ADLs, Patient to call before getting OOB, PT Consult for mobility concerns, PT Consult for assist device competence, Utilize walker, cane, or other assistive device, Utilize gait belt for transfers/ambulation, Strengthening exercises (ROM-active/passive)    Mentation Interventions: Adequate sleep, hydration, pain control, Door open when patient unattended, Evaluate medications/consider consulting pharmacy, Eyeglasses and hearing aids, Family/sitter at bedside, Familiar objects from home, Gait belt with transfers/ambulation, Update white board, Toileting rounds, Room close to nurse's station, Reorient patient, More frequent rounding, Increase mobility    Medication Interventions: Assess postural VS orthostatic hypotension, Evaluate medications/consider consulting pharmacy, Patient to call before getting OOB, Teach patient to arise slowly, Utilize gait belt for transfers/ambulation    Elimination Interventions: Call light in reach, Toilet paper/wipes in reach, Toileting schedule/hourly rounds, Patient to call for help with toileting needs, Elevated toilet seat, Stay With Me (per policy)              Problem: Patient Education: Go to Patient Education Activity  Goal: Patient/Family Education  Outcome: Progressing Towards Goal     Problem: Pressure Injury - Risk of  Goal: *Prevention of pressure injury  Description: Document Samy Scale and appropriate interventions in the flowsheet.   Outcome: Progressing Towards Goal  Note: Pressure Injury Interventions:  Sensory Interventions: Assess changes in LOC, Assess need for specialty bed, Avoid rigorous massage over bony prominences, Discuss PT/OT consult with provider, Chair cushion, Check visual cues for pain, Float heels, Keep linens dry and wrinkle-free, Maintain/enhance activity level, Minimize linen layers, Monitor skin under medical devices, Pad between skin to skin, Pressure redistribution bed/mattress (bed type), Turn and reposition approx. every two hours (pillows and wedges if needed)    Moisture Interventions: Absorbent underpads, Apply protective barrier, creams and emollients, Assess need for specialty bed, Check for incontinence Q2 hours and as needed, Contain wound drainage, Limit adult briefs, Maintain skin hydration (lotion/cream), Minimize layers, Moisture barrier, Offer toileting Q_hr    Activity Interventions: Assess need for specialty bed, Chair cushion, Increase time out of bed, Pressure redistribution bed/mattress(bed type), PT/OT evaluation    Mobility Interventions: Assess need for specialty bed, Chair cushion, Float heels, HOB 30 degrees or less, Pressure redistribution bed/mattress (bed type), PT/OT evaluation, Turn and reposition approx.  every two hours(pillow and wedges)    Nutrition Interventions: Document food/fluid/supplement intake, Discuss nutritional consult with provider, Offer support with meals,snacks and hydration    Friction and Shear Interventions: Apply protective barrier, creams and emollients, Feet elevated on foot rest, Foam dressings/transparent film/skin sealants, HOB 30 degrees or less, Lift sheet, Lift team/patient mobility team, Minimize layers                Problem: Patient Education: Go to Patient Education Activity  Goal: Patient/Family Education  Outcome: Progressing Towards Goal     Problem: Patient Education: Go to Patient Education Activity  Goal: Patient/Family Education  Outcome: Progressing Towards Goal

## 2021-05-21 NOTE — PROGRESS NOTES
Hospitalist Progress Note    NAME: Endy Mcgovern   :  1945   MRN:  [id number]       Assessment / Plan:  Acute on chronic systolic heart failure  Mild troponin elevation may be demand ischemia  Acute bronchitis   CAD s/p stent placement  -BNP is elevated more than 35,000. Chest x-ray shows no acute process. Pt has crackles on admission.   -recent Echo showed EF  25-30%, EKG neg for acute ischemia. Trop 0.08-->0.07  -pt with congestion and cough. procalcitonin wnl. Cont' mucinex and doxycycline. Repeat probnp today  -cont' coreg. Hold lasix and Entresto due to rising cr.  diuresed net neg 1.3L  -Strict I's and O's, daily weights and low-salt diet  -currently stable on RA, possibly home tomorrow if cr improves  -cardiology consultation appreciated     Elevated cr  Cr mildly elevated,  BUN up, urine dark, likely over diuresing. Holding lasix and entresto as above  Repeat bmp tomorrow, dc if cr improves. Allow fluids today. Daughter in law was updated on plan of care at bedside. History of coronary artery disease s/p PCI  History of peripheral arterial disease s/p bilateral lower extremity amputations  COPD not in exacerbation  -Continue home aspirin, atorvastatin, Coreg, Plavix       Code Status: Full code  Surrogate Decision Maker:  Jonny   DVT Prophylaxis: Lovenox  GI Prophylaxis: not indicated   Baseline: From home, lives with daughter-in-law, has bilateral lower extremity amputations     Subjective:     Chief Complaint / Reason for Physician Visit  Pt awake, up in bed, NAD. Was placed on O2 overnight as pt noted to be intermittently hypoxic in the setting of mouth breathing. Discussed with RN events overnight.      Review of Systems:  Symptom Y/N Comments  Symptom Y/N Comments   Fever/Chills    Chest Pain n    Poor Appetite    Edema     Cough    Abdominal Pain     Sputum    Joint Pain     SOB/BRINK n   Pruritis/Rash     Nausea/vomit    Tolerating PT/OT     Diarrhea    Tolerating Diet     Constipation    Other       Could NOT obtain due to:      Objective:     VITALS:   Last 24hrs VS reviewed since prior progress note. Most recent are:  Patient Vitals for the past 24 hrs:   Temp Pulse Resp BP SpO2   05/21/21 0806 97.6 °F (36.4 °C) (!) 56 20 (!) 112/54 94 %   05/21/21 0442 97 °F (36.1 °C) (!) 52      05/21/21 0312 (!) 96.4 °F (35.8 °C) (!) 56 20 108/64 96 %   05/21/21 0006 97.9 °F (36.6 °C) (!) 57 18 103/65 95 %   05/20/21 2023 97.9 °F (36.6 °C) (!) 59 16 (!) 112/58 94 %   05/20/21 1559 97.5 °F (36.4 °C) (!) 59 18 119/74 100 %   05/20/21 1500 97.8 °F (36.6 °C) 60 18 106/60 97 %   05/20/21 1447 97.5 °F (36.4 °C) (!) 56 20 (!) 91/54 95 %   05/20/21 1300 98 °F (36.7 °C) 61 18 110/66 100 %   05/20/21 1135 98.6 °F (37 °C) 72 20 106/69 91 %       Intake/Output Summary (Last 24 hours) at 5/21/2021 0919  Last data filed at 5/21/2021 4763  Gross per 24 hour   Intake 780 ml   Output 401 ml   Net 379 ml        I had a face to face encounter and independently examined this patient on 5/21/2021, as outlined below:  PHYSICAL EXAM:  General: WD, WN. Alert, cooperative, no acute distress    EENT:  EOMI. Anicteric sclerae. MMM  Resp:  Coarse bs b/l.  no wheezing or rales. No accessory muscle use  CV:  Regular  rhythm,  No edema  GI:  Soft, Non distended, Non tender. +BS  Neurologic:  Alert and oriented X 3, normal speech  Psych:   Not anxious nor agitated  Skin:  No rashes.   No jaundice    Reviewed most current lab test results and cultures  YES  Reviewed most current radiology test results   YES  Review and summation of old records today    NO  Reviewed patient's current orders and MAR    YES  PMH/SH reviewed - no change compared to H&P  ________________________________________________________________________  Care Plan discussed with:    Comments   Patient x    Family      RN x    Care Manager     Consultant                        Multidiciplinary team rounds were held today with , nursing, pharmacist and clinical coordinator. Patient's plan of care was discussed; medications were reviewed and discharge planning was addressed. ________________________________________________________________________  Total NON critical care TIME: 35  Minutes    Total CRITICAL CARE TIME Spent:   Minutes non procedure based      Comments   >50% of visit spent in counseling and coordination of care     ________________________________________________________________________  Levon Harrison MD     Procedures: see electronic medical records for all procedures/Xrays and details which were not copied into this note but were reviewed prior to creation of Plan. LABS:  I reviewed today's most current labs and imaging studies. Pertinent labs include:  No results for input(s): WBC, HGB, HCT, PLT, HGBEXT, HCTEXT, PLTEXT, HGBEXT, HCTEXT, PLTEXT in the last 72 hours.   Recent Labs     05/21/21  0351 05/20/21  0839 05/19/21  0503    135* 136   K 4.2 4.4 4.1    101 98   CO2 34* 27 30   GLU 77 85 80   BUN 50* 48* 42*   CREA 1.39* 1.31* 1.40*   CA 8.6 9.2 8.9       Signed: Levon Harrison MD

## 2021-05-22 VITALS
OXYGEN SATURATION: 96 % | TEMPERATURE: 97.6 F | HEART RATE: 66 BPM | WEIGHT: 161.2 LBS | BODY MASS INDEX: 29.66 KG/M2 | SYSTOLIC BLOOD PRESSURE: 105 MMHG | DIASTOLIC BLOOD PRESSURE: 54 MMHG | RESPIRATION RATE: 18 BRPM | HEIGHT: 62 IN

## 2021-05-22 LAB
ANION GAP SERPL CALC-SCNC: 2 MMOL/L (ref 5–15)
BUN SERPL-MCNC: 50 MG/DL (ref 6–20)
BUN/CREAT SERPL: 39 (ref 12–20)
CALCIUM SERPL-MCNC: 8.1 MG/DL (ref 8.5–10.1)
CHLORIDE SERPL-SCNC: 99 MMOL/L (ref 97–108)
CO2 SERPL-SCNC: 33 MMOL/L (ref 21–32)
CREAT SERPL-MCNC: 1.28 MG/DL (ref 0.55–1.02)
GLUCOSE SERPL-MCNC: 72 MG/DL (ref 65–100)
POTASSIUM SERPL-SCNC: 4.3 MMOL/L (ref 3.5–5.1)
SODIUM SERPL-SCNC: 134 MMOL/L (ref 136–145)

## 2021-05-22 PROCEDURE — 80048 BASIC METABOLIC PNL TOTAL CA: CPT

## 2021-05-22 PROCEDURE — 36415 COLL VENOUS BLD VENIPUNCTURE: CPT

## 2021-05-22 PROCEDURE — 74011250637 HC RX REV CODE- 250/637: Performed by: INTERNAL MEDICINE

## 2021-05-22 PROCEDURE — 74011250636 HC RX REV CODE- 250/636: Performed by: INTERNAL MEDICINE

## 2021-05-22 PROCEDURE — 94760 N-INVAS EAR/PLS OXIMETRY 1: CPT

## 2021-05-22 RX ORDER — FUROSEMIDE 40 MG/1
40 TABLET ORAL DAILY
Qty: 30 TABLET | Refills: 0 | Status: SHIPPED | OUTPATIENT
Start: 2021-05-22

## 2021-05-22 RX ORDER — CARVEDILOL 12.5 MG/1
6.25 TABLET ORAL 2 TIMES DAILY WITH MEALS
Qty: 60 TABLET | Refills: 0 | Status: SHIPPED | OUTPATIENT
Start: 2021-05-22

## 2021-05-22 RX ADMIN — ENOXAPARIN SODIUM 40 MG: 40 INJECTION SUBCUTANEOUS at 09:47

## 2021-05-22 RX ADMIN — GUAIFENESIN 600 MG: 600 TABLET, EXTENDED RELEASE ORAL at 09:47

## 2021-05-22 RX ADMIN — ASPIRIN 81 MG: 81 TABLET, COATED ORAL at 09:47

## 2021-05-22 RX ADMIN — POTASSIUM CHLORIDE 10 MEQ: 750 TABLET, FILM COATED, EXTENDED RELEASE ORAL at 09:47

## 2021-05-22 RX ADMIN — ATORVASTATIN CALCIUM 40 MG: 40 TABLET, FILM COATED ORAL at 09:47

## 2021-05-22 RX ADMIN — CARVEDILOL 12.5 MG: 12.5 TABLET, FILM COATED ORAL at 09:47

## 2021-05-22 RX ADMIN — CLOPIDOGREL BISULFATE 75 MG: 75 TABLET ORAL at 09:47

## 2021-05-22 NOTE — PROGRESS NOTES
Pt given education regarding discharge instructions. Opportunities for questions given. PIV taken out with cath tip intact. Pt discharged home with daughter-in-law and personal belongings.

## 2021-05-22 NOTE — PROGRESS NOTES
Progress Note      5/22/2021 10:48 AM  NAME: Magali Bradley   MRN:  [id number]   Admit Diagnosis: Acute exacerbation of CHF (congestive heart failure) ([facility] [I50.9]     Assessment:     Acute on chronic systolic chf         Pro BNP > 35,000     - Anterior MI with multivessel stenting 5/2020  - ICM EF 25% Echo 3/2021  - Sinus with poor R wave  - HTN    Inadequately controlled. - dyslpidemia  - CKD  - PvD s/p bilateral BKA      , uses a wheelchair at home            Plan:     continue with diuresis. Titrate heart failure meds. 5/19 with rising creatinine Entresto was put on hold today . 5/21  Continue meds. Resume Entresto at  Increased dose when renal function allows. 5/22  Discussed with Dr Angelique Rios . OK for discharge on meds. Resume Entresto . See PCP within the week. [x]        High complexity decision making was performed    Subjective:     Magali Bradley denies chest pain, dyspnea. Discussed with RN events overnight.      Patient Active Problem List   Diagnosis Code    HTN (hypertension) I10    Depressive disorder, not elsewhere classified F32.9    PVD (peripheral vascular disease) ([facility] I73.9    Nausea with vomiting R11.2    HTN (hypertension) I10    Chronic hyponatremia E87.1    Sinus bradycardia R00.1    MRSA infection within last 3 months Z86.14    Status post skin graft Z94.5    STEMI (ST elevation myocardial infarction) ([facility] I21.3    STEMI involving left anterior descending coronary artery (HCC) I21.02    Anorexia R63.0    Abdominal pain R10.9    CHF exacerbation (HCC) I50.9    Acute exacerbation of CHF (congestive heart failure) ([facility]) I50.9       Review of Systems:    Symptom Y/N Comments  Symptom Y/N Comments   Fever/Chills N   Chest Pain N    Poor Appetite N   Edema N    Cough N   Abdominal Pain N    Sputum N   Joint Pain N    SOB/BRINK N   Pruritis/Rash N    Nausea/vomit N   Tolerating PT/OT Y    Diarrhea N   Tolerating Diet Y Constipation N   Other       Could NOT obtain due to:      Objective:      Physical Exam:    Last 24hrs VS reviewed since prior progress note. Most recent are:    Visit Vitals  BP (!) 105/54   Pulse 66   Temp 97.6 °F (36.4 °C)   Resp 18   Ht 5' 2\" (1.575 m)   Wt 73.1 kg (161 lb 3.2 oz)   LMP  (LMP Unknown)   SpO2 96%   BMI 29.48 kg/m²       Intake/Output Summary (Last 24 hours) at 5/22/2021 1215  Last data filed at 5/22/2021 6962  Gross per 24 hour   Intake 960 ml   Output 801 ml   Net 159 ml        General Appearance: Well developed, well nourished, alert & oriented x 3,    no acute distress. Ears/Nose/Mouth/Throat: Hearing grossly normal.  Neck: Supple. Chest: Lungs clear to auscultation bilaterally. Cardiovascular: Regular rate and rhythm, S1S2 normal, no murmur. Abdomen: Soft, non-tender, bowel sounds are active. Extremities: No edema bilaterally. Skin: Warm and dry. PMH/SH reviewed - no change compared to H&P    Data Review    Telemetry: normal sinus rhythm     Lab Data Personally Reviewed:    No results for input(s): WBC, HGB, HCT, PLT, HGBEXT, HCTEXT, PLTEXT, HGBEXT, HCTEXT, PLTEXT in the last 72 hours. LABRCNT(INR:3,PTP:3,APTT:3,)  Recent Labs     05/22/21  0338 05/21/21  0351 05/20/21  0839   * 137 135*   K 4.3 4.2 4.4   CL 99 100 101   CO2 33* 34* 27   BUN 50* 50* 48*   CREA 1.28* 1.39* 1.31*   GLU 72 77 85   CA 8.1* 8.6 9.2   LABRCNT(CPK:3,CpKMB:3,ckndx:3,troiq:3)  Lab Results   Component Value Date/Time    Cholesterol, total 144 08/19/2012 04:00 AM    HDL Cholesterol 43 08/19/2012 04:00 AM    LDL, calculated 86.6 08/19/2012 04:00 AM    Triglyceride 72 08/19/2012 04:00 AM    CHOL/HDL Ratio 3.3 08/19/2012 04:00 AM   LABRCNT(sgot:3,gpt:3,ap:3,tbiL:3,TP:3,ALB:3,GLOB:3,ggt:3,aml:3,amyp:3,lpse:3,hlpse:3)No results for input(s): PH, PCO2, PO2 in the last 72 hours.   Lab Results   Component Value Date/Time    Cholesterol, total 144 08/19/2012 04:00 AM    HDL Cholesterol 43 08/19/2012 04:00 AM LDL, calculated 86.6 08/19/2012 04:00 AM    Triglyceride 72 08/19/2012 04:00 AM    CHOL/HDL Ratio 3.3 08/19/2012 04:00 AM   MEDTABLELukasz Lundberg MD  No results for input(s): PH, PCO2, PO2 in the last 72 hours.     Medications Personally Reviewed:    Current Facility-Administered Medications   Medication Dose Route Frequency    [Held by provider] sacubitriL-valsartan (ENTRESTO) 49-51 mg tablet 1 Tab  1 Tablet Oral Q12H    hydrALAZINE (APRESOLINE) 20 mg/mL injection 10 mg  10 mg IntraVENous Q4H PRN    guaiFENesin ER (MUCINEX) tablet 600 mg  600 mg Oral Q12H    aspirin delayed-release tablet 81 mg  81 mg Oral DAILY    atorvastatin (LIPITOR) tablet 40 mg  40 mg Oral DAILY    carvediloL (COREG) tablet 12.5 mg  12.5 mg Oral BID WITH MEALS    clopidogreL (PLAVIX) tablet 75 mg  75 mg Oral DAILY    potassium chloride SR (KLOR-CON 10) tablet 10 mEq  10 mEq Oral DAILY    sodium chloride (NS) flush 5-40 mL  5-40 mL IntraVENous Q8H    sodium chloride (NS) flush 5-40 mL  5-40 mL IntraVENous PRN    acetaminophen (TYLENOL) tablet 650 mg  650 mg Oral Q6H PRN    Or    acetaminophen (TYLENOL) suppository 650 mg  650 mg Rectal Q6H PRN    polyethylene glycol (MIRALAX) packet 17 g  17 g Oral DAILY PRN    promethazine (PHENERGAN) tablet 12.5 mg  12.5 mg Oral Q6H PRN    Or    ondansetron (ZOFRAN) injection 4 mg  4 mg IntraVENous Q6H PRN    enoxaparin (LOVENOX) injection 40 mg  40 mg SubCUTAneous DAILY         Gretchen Grant MD

## 2021-05-22 NOTE — PROGRESS NOTES
End of Shift Note    Bedside shift change report given to LUCIE Maynard (oncoming nurse) by Delia Ayala RN (offgoing nurse). Report included the following information SBAR, Kardex, Intake/Output, MAR and Recent Results    Shift worked:  Night shift     Shift summary and any significant changes:    No significant changes. BUN (50) and creatinine (1.28) continue to be elevated, and sinus arnulfo   Concerns for physician to address:       Zone phone for oncoming shift:  [phone]     Activity:  Activity Level: Bed Rest, Logroll  Number times ambulated in hallways past shift: 0  Number of times OOB to chair past shift: 0    Cardiac:   Cardiac Monitoring: Yes      Cardiac Rhythm: Sinus Rhythm, Sinus Arnulfo    Access:   Current line(s): PIV     Genitourinary:   Urinary status: voiding and external catheter    Respiratory:   O2 Device: None (Room air)  Chronic home O2 use?: NO  Incentive spirometer at bedside: NO     GI:  Last Bowel Movement Date: 05/21/21  Current diet:  DIET CARDIAC Regular  DIET NUTRITIONAL SUPPLEMENTS Dinner, Lunch; Ensure Pudding  DIET NUTRITIONAL SUPPLEMENTS Breakfast; Ensure Compact  Passing flatus: YES  Tolerating current diet: YES       Pain Management:   Patient states pain is manageable on current regimen: YES    Skin:  Samy Score: 16  Interventions: turn team and PT/OT consult    Patient Safety:  Fall Score:  Total Score: 3  Interventions: bed/chair alarm  High Fall Risk: Yes    Length of Stay:  Expected LOS: 4d 0h  Actual LOS: 6      Delia Aayla RN

## 2021-05-22 NOTE — PROGRESS NOTES
Problem: Falls - Risk of  Goal: *Absence of Falls  Description: Document Bowles Maple Fall Risk and appropriate interventions in the flowsheet. Outcome: Progressing Towards Goal  Note: Fall Risk Interventions:  Mobility Interventions: Assess mobility with egress test, Communicate number of staff needed for ambulation/transfer, OT consult for ADLs, Patient to call before getting OOB, PT Consult for mobility concerns, PT Consult for assist device competence, Utilize walker, cane, or other assistive device, Utilize gait belt for transfers/ambulation, Strengthening exercises (ROM-active/passive)    Mentation Interventions: Adequate sleep, hydration, pain control, Door open when patient unattended, Evaluate medications/consider consulting pharmacy, Eyeglasses and hearing aids, Family/sitter at bedside, Familiar objects from home, Gait belt with transfers/ambulation, Update white board, Toileting rounds, Room close to nurse's station, Reorient patient, More frequent rounding, Increase mobility    Medication Interventions: Assess postural VS orthostatic hypotension, Evaluate medications/consider consulting pharmacy, Patient to call before getting OOB, Teach patient to arise slowly, Utilize gait belt for transfers/ambulation    Elimination Interventions: Call light in reach, Toilet paper/wipes in reach, Toileting schedule/hourly rounds, Patient to call for help with toileting needs, Elevated toilet seat, Stay With Me (per policy)              Problem: Pressure Injury - Risk of  Goal: *Prevention of pressure injury  Description: Document Samy Scale and appropriate interventions in the flowsheet.   Outcome: Progressing Towards Goal  Note: Pressure Injury Interventions:  Sensory Interventions: Assess changes in LOC, Assess need for specialty bed, Avoid rigorous massage over bony prominences, Discuss PT/OT consult with provider, Chair cushion, Check visual cues for pain, Float heels, Keep linens dry and wrinkle-free, Maintain/enhance activity level, Minimize linen layers, Monitor skin under medical devices, Pad between skin to skin, Pressure redistribution bed/mattress (bed type), Turn and reposition approx. every two hours (pillows and wedges if needed)    Moisture Interventions: Absorbent underpads, Apply protective barrier, creams and emollients, Assess need for specialty bed, Check for incontinence Q2 hours and as needed, Contain wound drainage, Limit adult briefs, Maintain skin hydration (lotion/cream), Minimize layers, Moisture barrier, Offer toileting Q_hr    Activity Interventions: Assess need for specialty bed, Chair cushion, Increase time out of bed, Pressure redistribution bed/mattress(bed type), PT/OT evaluation    Mobility Interventions: Assess need for specialty bed, Chair cushion, Float heels, HOB 30 degrees or less, Pressure redistribution bed/mattress (bed type), PT/OT evaluation, Turn and reposition approx.  every two hours(pillow and wedges)    Nutrition Interventions: Document food/fluid/supplement intake, Discuss nutritional consult with provider, Offer support with meals,snacks and hydration    Friction and Shear Interventions: Apply protective barrier, creams and emollients, Feet elevated on foot rest, Foam dressings/transparent film/skin sealants, HOB 30 degrees or less, Lift sheet, Lift team/patient mobility team, Minimize layers

## 2021-05-22 NOTE — PROGRESS NOTES
Problem: Falls - Risk of  Goal: *Absence of Falls  Description: Document Amanda Cabrera Fall Risk and appropriate interventions in the flowsheet. Outcome: Resolved/Met  Note: Fall Risk Interventions:  Mobility Interventions: Assess mobility with egress test, Communicate number of staff needed for ambulation/transfer, OT consult for ADLs, Patient to call before getting OOB, PT Consult for mobility concerns, PT Consult for assist device competence, Strengthening exercises (ROM-active/passive), Utilize walker, cane, or other assistive device, Utilize gait belt for transfers/ambulation    Mentation Interventions: Adequate sleep, hydration, pain control, Evaluate medications/consider consulting pharmacy, Door open when patient unattended, Eyeglasses and hearing aids, Familiar objects from home, Update white board, Toileting rounds, Room close to nurse's station, Reorient patient, More frequent rounding, Increase mobility, Family/sitter at bedside, Gait belt with transfers/ambulation    Medication Interventions: Assess postural VS orthostatic hypotension, Evaluate medications/consider consulting pharmacy, Patient to call before getting OOB, Teach patient to arise slowly, Utilize gait belt for transfers/ambulation    Elimination Interventions: Call light in reach, Elevated toilet seat, Patient to call for help with toileting needs, Stay With Me (per policy), Toilet paper/wipes in reach, Toileting schedule/hourly rounds              Problem: Patient Education: Go to Patient Education Activity  Goal: Patient/Family Education  Outcome: Resolved/Met     Problem: Pressure Injury - Risk of  Goal: *Prevention of pressure injury  Description: Document Samy Scale and appropriate interventions in the flowsheet.   Outcome: Resolved/Met  Note: Pressure Injury Interventions:  Sensory Interventions: Assess changes in LOC, Assess need for specialty bed, Avoid rigorous massage over bony prominences, Discuss PT/OT consult with provider, Chair cushion, Check visual cues for pain, Float heels, Keep linens dry and wrinkle-free, Maintain/enhance activity level, Minimize linen layers, Monitor skin under medical devices, Pad between skin to skin, Pressure redistribution bed/mattress (bed type), Turn and reposition approx. every two hours (pillows and wedges if needed)    Moisture Interventions: Absorbent underpads, Apply protective barrier, creams and emollients, Assess need for specialty bed, Check for incontinence Q2 hours and as needed, Contain wound drainage, Limit adult briefs, Maintain skin hydration (lotion/cream), Minimize layers, Moisture barrier, Offer toileting Q_hr    Activity Interventions: Assess need for specialty bed, Chair cushion, Increase time out of bed, Pressure redistribution bed/mattress(bed type), PT/OT evaluation    Mobility Interventions: Assess need for specialty bed, Chair cushion, Float heels, HOB 30 degrees or less, Pressure redistribution bed/mattress (bed type), PT/OT evaluation, Turn and reposition approx.  every two hours(pillow and wedges)    Nutrition Interventions: Document food/fluid/supplement intake, Discuss nutritional consult with provider, Offer support with meals,snacks and hydration    Friction and Shear Interventions: Apply protective barrier, creams and emollients, Feet elevated on foot rest, Foam dressings/transparent film/skin sealants, HOB 30 degrees or less, Lift sheet, Lift team/patient mobility team, Minimize layers                Problem: Patient Education: Go to Patient Education Activity  Goal: Patient/Family Education  Outcome: Resolved/Met     Problem: Patient Education: Go to Patient Education Activity  Goal: Patient/Family Education  Outcome: Resolved/Met     Problem: Heart Failure: Day 1  Goal: Off Pathway (Use only if patient is Off Pathway)  Outcome: Resolved/Met  Goal: Activity/Safety  Outcome: Resolved/Met  Goal: Consults, if ordered  Outcome: Resolved/Met  Goal: Diagnostic Test/Procedures  Outcome: Resolved/Met  Goal: Nutrition/Diet  Outcome: Resolved/Met  Goal: Discharge Planning  Outcome: Resolved/Met  Goal: Medications  Outcome: Resolved/Met  Goal: Respiratory  Outcome: Resolved/Met  Goal: Treatments/Interventions/Procedures  Outcome: Resolved/Met  Goal: Psychosocial  Outcome: Resolved/Met  Goal: *Oxygen saturation within defined limits  Outcome: Resolved/Met  Goal: *Hemodynamically stable  Outcome: Resolved/Met  Goal: *Optimal pain control at patient's stated goal  Outcome: Resolved/Met  Goal: *Anxiety reduced or absent  Outcome: Resolved/Met     Problem: Heart Failure: Day 2  Goal: Off Pathway (Use only if patient is Off Pathway)  Outcome: Resolved/Met  Goal: Activity/Safety  Outcome: Resolved/Met  Goal: Consults, if ordered  Outcome: Resolved/Met  Goal: Diagnostic Test/Procedures  Outcome: Resolved/Met  Goal: Nutrition/Diet  Outcome: Resolved/Met  Goal: Discharge Planning  Outcome: Resolved/Met  Goal: Medications  Outcome: Resolved/Met  Goal: Respiratory  Outcome: Resolved/Met  Goal: Treatments/Interventions/Procedures  Outcome: Resolved/Met  Goal: Psychosocial  Outcome: Resolved/Met  Goal: *Oxygen saturation within defined limits  Outcome: Resolved/Met  Goal: *Hemodynamically stable  Outcome: Resolved/Met  Goal: *Optimal pain control at patient's stated goal  Outcome: Resolved/Met  Goal: *Anxiety reduced or absent  Outcome: Resolved/Met  Goal: *Demonstrates progressive activity  Outcome: Resolved/Met     Problem: Heart Failure: Day 3  Goal: Off Pathway (Use only if patient is Off Pathway)  Outcome: Resolved/Met  Goal: Activity/Safety  Outcome: Resolved/Met  Goal: Diagnostic Test/Procedures  Outcome: Resolved/Met  Goal: Nutrition/Diet  Outcome: Resolved/Met  Goal: Discharge Planning  Outcome: Resolved/Met  Goal: Medications  Outcome: Resolved/Met  Goal: Respiratory  Outcome: Resolved/Met  Goal: Treatments/Interventions/Procedures  Outcome: Resolved/Met  Goal: Psychosocial  Outcome: Resolved/Met  Goal: *Oxygen saturation within defined limits  Outcome: Resolved/Met  Goal: *Hemodynamically stable  Outcome: Resolved/Met  Goal: *Optimal pain control at patient's stated goal  Outcome: Resolved/Met  Goal: *Anxiety reduced or absent  Outcome: Resolved/Met  Goal: *Demonstrates progressive activity  Outcome: Resolved/Met     Problem: Heart Failure: Day 4  Goal: Off Pathway (Use only if patient is Off Pathway)  Outcome: Resolved/Met  Goal: Activity/Safety  Outcome: Resolved/Met  Goal: Diagnostic Test/Procedures  Outcome: Resolved/Met  Goal: Nutrition/Diet  Outcome: Resolved/Met  Goal: Discharge Planning  Outcome: Resolved/Met  Goal: Medications  Outcome: Resolved/Met  Goal: Respiratory  Outcome: Resolved/Met  Goal: Treatments/Interventions/Procedures  Outcome: Resolved/Met  Goal: Psychosocial  Outcome: Resolved/Met  Goal: *Oxygen saturation within defined limits  Outcome: Resolved/Met  Goal: *Hemodynamically stable  Outcome: Resolved/Met  Goal: *Optimal pain control at patient's stated goal  Outcome: Resolved/Met  Goal: *Anxiety reduced or absent  Outcome: Resolved/Met  Goal: *Demonstrates progressive activity  Outcome: Resolved/Met     Problem: Heart Failure: Day 5  Goal: Off Pathway (Use only if patient is Off Pathway)  Outcome: Resolved/Met  Goal: Activity/Safety  Outcome: Resolved/Met  Goal: Diagnostic Test/Procedures  Outcome: Resolved/Met  Goal: Nutrition/Diet  Outcome: Resolved/Met  Goal: Discharge Planning  Outcome: Resolved/Met  Goal: Medications  Outcome: Resolved/Met  Goal: Respiratory  Outcome: Resolved/Met  Goal: Treatments/Interventions/Procedures  Outcome: Resolved/Met  Goal: Psychosocial  Outcome: Resolved/Met     Problem: Heart Failure: Discharge Outcomes  Goal: *Demonstrates ability to perform prescribed activity without shortness of breath or discomfort  Outcome: Resolved/Met  Goal: *Left ventricular function assessment completed prior to or during stay, or planned for post-discharge  Outcome: Resolved/Met  Goal: *ACEI prescribed if LVEF less than 40% and no contraindications or ARB prescribed  Outcome: Resolved/Met  Goal: *Verbalizes understanding and describes prescribed diet  Outcome: Resolved/Met  Goal: *Verbalizes understanding/describes prescribed medications  Outcome: Resolved/Met  Goal: *Describes available resources and support systems  Description: (eg: Home Health, Palliative Care, Advanced Medical Directive)  Outcome: Resolved/Met  Goal: *Describes smoking cessation resources  Outcome: Resolved/Met  Goal: *Understands and describes signs and symptoms to report to providers(Stroke Metric)  Outcome: Resolved/Met  Goal: *Describes/verbalizes understanding of follow-up/return appt  Description: (eg: to physicians, diabetes treatment coordinator, and other resources  Outcome: Resolved/Met  Goal: *Describes importance of continuing daily weights and changes to report to physician  Outcome: Resolved/Met

## 2021-09-23 ENCOUNTER — TRANSCRIBE ORDER (OUTPATIENT)
Dept: SCHEDULING | Age: 76
End: 2021-09-23

## 2021-09-23 DIAGNOSIS — Z12.31 VISIT FOR SCREENING MAMMOGRAM: Primary | ICD-10-CM

## 2022-01-03 ENCOUNTER — HOSPITAL ENCOUNTER (OUTPATIENT)
Dept: PREADMISSION TESTING | Age: 77
Discharge: HOME | End: 2022-01-03

## 2022-01-06 ENCOUNTER — ANESTHESIA EVENT (OUTPATIENT)
Dept: CARDIAC CATH/INVASIVE PROCEDURES | Age: 77
End: 2022-01-06
Payer: MEDICARE

## 2022-01-06 ENCOUNTER — HOSPITAL ENCOUNTER (OUTPATIENT)
Age: 77
Discharge: HOME | End: 2022-01-07
Attending: INTERNAL MEDICINE | Admitting: INTERNAL MEDICINE
Payer: MEDICARE

## 2022-01-06 ENCOUNTER — ANESTHESIA (OUTPATIENT)
Dept: CARDIAC CATH/INVASIVE PROCEDURES | Age: 77
End: 2022-01-06
Payer: MEDICARE

## 2022-01-06 ENCOUNTER — APPOINTMENT (OUTPATIENT)
Dept: GENERAL RADIOLOGY | Age: 77
End: 2022-01-06
Attending: INTERNAL MEDICINE
Payer: MEDICARE

## 2022-01-06 DIAGNOSIS — I42.9 CARDIOMYOPATHY, UNSPECIFIED TYPE (HCC): ICD-10-CM

## 2022-01-06 PROBLEM — Z95.810 ICD (IMPLANTABLE CARDIOVERTER-DEFIBRILLATOR) IN PLACE: Status: ACTIVE | Noted: 2022-01-06

## 2022-01-06 LAB
COVID-19 RAPID TEST, COVR: NOT DETECTED
SOURCE, COVRS: NORMAL

## 2022-01-06 PROCEDURE — 74011000258 HC RX REV CODE- 258: Performed by: NURSE ANESTHETIST, CERTIFIED REGISTERED

## 2022-01-06 PROCEDURE — C1721 AICD, DUAL CHAMBER: HCPCS | Performed by: INTERNAL MEDICINE

## 2022-01-06 PROCEDURE — 2709999900 HC NON-CHARGEABLE SUPPLY

## 2022-01-06 PROCEDURE — 77030002996 HC SUT SLK J&J -A: Performed by: INTERNAL MEDICINE

## 2022-01-06 PROCEDURE — 77030039266 HC ADH SKN EXOFIN S2SG -A: Performed by: INTERNAL MEDICINE

## 2022-01-06 PROCEDURE — C1777 LEAD, AICD, ENDO SINGLE COIL: HCPCS | Performed by: INTERNAL MEDICINE

## 2022-01-06 PROCEDURE — 77030038269 HC DRN EXT URIN PURWCK BARD -A

## 2022-01-06 PROCEDURE — C1894 INTRO/SHEATH, NON-LASER: HCPCS | Performed by: INTERNAL MEDICINE

## 2022-01-06 PROCEDURE — 74011000272 HC RX REV CODE- 272: Performed by: INTERNAL MEDICINE

## 2022-01-06 PROCEDURE — 74011000250 HC RX REV CODE- 250: Performed by: INTERNAL MEDICINE

## 2022-01-06 PROCEDURE — 77030018729 HC ELECTRD DEFIB PAD CARD -B: Performed by: INTERNAL MEDICINE

## 2022-01-06 PROCEDURE — 76060000033 HC ANESTHESIA 1 TO 1.5 HR: Performed by: INTERNAL MEDICINE

## 2022-01-06 PROCEDURE — 71045 X-RAY EXAM CHEST 1 VIEW: CPT

## 2022-01-06 PROCEDURE — 77030039267 HC ADH SKN EXOFIN S2SG -B: Performed by: INTERNAL MEDICINE

## 2022-01-06 PROCEDURE — 74011250636 HC RX REV CODE- 250/636: Performed by: INTERNAL MEDICINE

## 2022-01-06 PROCEDURE — 74011250637 HC RX REV CODE- 250/637: Performed by: INTERNAL MEDICINE

## 2022-01-06 PROCEDURE — 2709999900 HC NON-CHARGEABLE SUPPLY: Performed by: INTERNAL MEDICINE

## 2022-01-06 PROCEDURE — C1892 INTRO/SHEATH,FIXED,PEEL-AWAY: HCPCS | Performed by: INTERNAL MEDICINE

## 2022-01-06 PROCEDURE — C1898 LEAD, PMKR, OTHER THAN TRANS: HCPCS | Performed by: INTERNAL MEDICINE

## 2022-01-06 PROCEDURE — 33249 INSJ/RPLCMT DEFIB W/LEAD(S): CPT | Performed by: INTERNAL MEDICINE

## 2022-01-06 PROCEDURE — 87635 SARS-COV-2 COVID-19 AMP PRB: CPT

## 2022-01-06 PROCEDURE — 77030031139 HC SUT VCRL2 J&J -A: Performed by: INTERNAL MEDICINE

## 2022-01-06 PROCEDURE — 74011250636 HC RX REV CODE- 250/636: Performed by: NURSE ANESTHETIST, CERTIFIED REGISTERED

## 2022-01-06 PROCEDURE — 74011000250 HC RX REV CODE- 250: Performed by: NURSE ANESTHETIST, CERTIFIED REGISTERED

## 2022-01-06 DEVICE — PACING LEAD
Type: IMPLANTABLE DEVICE | Status: FUNCTIONAL
Brand: TENDRIL™

## 2022-01-06 DEVICE — DEFIBRILLATION LEAD
Type: IMPLANTABLE DEVICE | Status: FUNCTIONAL
Brand: OPTISURE™

## 2022-01-06 DEVICE — DR IMPLANTABLE CARDIOVERTER DEFIBRILLATOR VVED DDDR
Type: IMPLANTABLE DEVICE | Status: FUNCTIONAL
Brand: GALLANT™

## 2022-01-06 RX ORDER — ATORVASTATIN CALCIUM 40 MG/1
40 TABLET, FILM COATED ORAL DAILY
Status: DISCONTINUED | OUTPATIENT
Start: 2022-01-07 | End: 2022-01-07 | Stop reason: HOSPADM

## 2022-01-06 RX ORDER — PHENYLEPHRINE HCL IN 0.9% NACL 0.4MG/10ML
SYRINGE (ML) INTRAVENOUS AS NEEDED
Status: DISCONTINUED | OUTPATIENT
Start: 2022-01-06 | End: 2022-01-06 | Stop reason: HOSPADM

## 2022-01-06 RX ORDER — TRAMADOL HYDROCHLORIDE 50 MG/1
50 TABLET ORAL
Status: DISCONTINUED | OUTPATIENT
Start: 2022-01-06 | End: 2022-01-07 | Stop reason: HOSPADM

## 2022-01-06 RX ORDER — CEFAZOLIN SODIUM 1 G/3ML
INJECTION, POWDER, FOR SOLUTION INTRAMUSCULAR; INTRAVENOUS AS NEEDED
Status: DISCONTINUED | OUTPATIENT
Start: 2022-01-06 | End: 2022-01-06 | Stop reason: HOSPADM

## 2022-01-06 RX ORDER — SODIUM CHLORIDE 0.9 % (FLUSH) 0.9 %
5-40 SYRINGE (ML) INJECTION AS NEEDED
Status: DISCONTINUED | OUTPATIENT
Start: 2022-01-06 | End: 2022-01-07 | Stop reason: HOSPADM

## 2022-01-06 RX ORDER — ESCITALOPRAM OXALATE 10 MG/1
5 TABLET ORAL DAILY
Status: DISCONTINUED | OUTPATIENT
Start: 2022-01-07 | End: 2022-01-07 | Stop reason: HOSPADM

## 2022-01-06 RX ORDER — SODIUM CHLORIDE 9 MG/ML
INJECTION, SOLUTION INTRAVENOUS
Status: DISCONTINUED | OUTPATIENT
Start: 2022-01-06 | End: 2022-01-06 | Stop reason: HOSPADM

## 2022-01-06 RX ORDER — CLOPIDOGREL BISULFATE 75 MG/1
75 TABLET ORAL DAILY
Status: DISCONTINUED | OUTPATIENT
Start: 2022-01-07 | End: 2022-01-07 | Stop reason: HOSPADM

## 2022-01-06 RX ORDER — TRAZODONE HYDROCHLORIDE 50 MG/1
100 TABLET ORAL
COMMUNITY

## 2022-01-06 RX ORDER — FENTANYL CITRATE 50 UG/ML
INJECTION, SOLUTION INTRAMUSCULAR; INTRAVENOUS AS NEEDED
Status: DISCONTINUED | OUTPATIENT
Start: 2022-01-06 | End: 2022-01-06 | Stop reason: HOSPADM

## 2022-01-06 RX ORDER — ESCITALOPRAM OXALATE 5 MG/1
5 TABLET ORAL DAILY
COMMUNITY

## 2022-01-06 RX ORDER — TRAZODONE HYDROCHLORIDE 50 MG/1
100 TABLET ORAL
Status: DISCONTINUED | OUTPATIENT
Start: 2022-01-06 | End: 2022-01-07 | Stop reason: HOSPADM

## 2022-01-06 RX ORDER — OXYCODONE AND ACETAMINOPHEN 5; 325 MG/1; MG/1
1 TABLET ORAL
Status: DISCONTINUED | OUTPATIENT
Start: 2022-01-06 | End: 2022-01-06

## 2022-01-06 RX ORDER — POTASSIUM CHLORIDE 20 MEQ/1
20 TABLET, EXTENDED RELEASE ORAL DAILY
Status: DISCONTINUED | OUTPATIENT
Start: 2022-01-07 | End: 2022-01-07 | Stop reason: HOSPADM

## 2022-01-06 RX ORDER — EPHEDRINE SULFATE/0.9% NACL/PF 50 MG/5 ML
SYRINGE (ML) INTRAVENOUS AS NEEDED
Status: DISCONTINUED | OUTPATIENT
Start: 2022-01-06 | End: 2022-01-06 | Stop reason: HOSPADM

## 2022-01-06 RX ORDER — FUROSEMIDE 40 MG/1
40 TABLET ORAL 2 TIMES DAILY
Status: DISCONTINUED | OUTPATIENT
Start: 2022-01-06 | End: 2022-01-07 | Stop reason: HOSPADM

## 2022-01-06 RX ORDER — ASPIRIN 81 MG/1
81 TABLET ORAL DAILY
Status: DISCONTINUED | OUTPATIENT
Start: 2022-01-07 | End: 2022-01-07 | Stop reason: HOSPADM

## 2022-01-06 RX ORDER — PROPOFOL 10 MG/ML
INJECTION, EMULSION INTRAVENOUS
Status: DISCONTINUED | OUTPATIENT
Start: 2022-01-06 | End: 2022-01-06 | Stop reason: HOSPADM

## 2022-01-06 RX ORDER — SODIUM CHLORIDE 0.9 % (FLUSH) 0.9 %
5-40 SYRINGE (ML) INJECTION EVERY 8 HOURS
Status: DISCONTINUED | OUTPATIENT
Start: 2022-01-06 | End: 2022-01-07 | Stop reason: HOSPADM

## 2022-01-06 RX ORDER — LIDOCAINE HYDROCHLORIDE AND EPINEPHRINE 10; 10 MG/ML; UG/ML
INJECTION, SOLUTION INFILTRATION; PERINEURAL AS NEEDED
Status: DISCONTINUED | OUTPATIENT
Start: 2022-01-06 | End: 2022-01-06 | Stop reason: HOSPADM

## 2022-01-06 RX ORDER — ACETAMINOPHEN 325 MG/1
650 TABLET ORAL
Status: DISCONTINUED | OUTPATIENT
Start: 2022-01-06 | End: 2022-01-07 | Stop reason: HOSPADM

## 2022-01-06 RX ORDER — GABAPENTIN 300 MG/1
300 CAPSULE ORAL AS NEEDED
Status: DISCONTINUED | OUTPATIENT
Start: 2022-01-06 | End: 2022-01-07 | Stop reason: HOSPADM

## 2022-01-06 RX ORDER — CARVEDILOL 6.25 MG/1
6.25 TABLET ORAL 2 TIMES DAILY WITH MEALS
Status: DISCONTINUED | OUTPATIENT
Start: 2022-01-06 | End: 2022-01-07 | Stop reason: HOSPADM

## 2022-01-06 RX ORDER — ONDANSETRON 2 MG/ML
4 INJECTION INTRAMUSCULAR; INTRAVENOUS
Status: DISCONTINUED | OUTPATIENT
Start: 2022-01-06 | End: 2022-01-07 | Stop reason: HOSPADM

## 2022-01-06 RX ADMIN — SODIUM CHLORIDE, PRESERVATIVE FREE 10 ML: 5 INJECTION INTRAVENOUS at 22:30

## 2022-01-06 RX ADMIN — Medication 80 MCG: at 12:06

## 2022-01-06 RX ADMIN — FENTANYL CITRATE 25 MCG: 50 INJECTION, SOLUTION INTRAMUSCULAR; INTRAVENOUS at 12:22

## 2022-01-06 RX ADMIN — ACETAMINOPHEN 650 MG: 325 TABLET ORAL at 19:54

## 2022-01-06 RX ADMIN — Medication 120 MCG: at 12:09

## 2022-01-06 RX ADMIN — CEFAZOLIN 2 G: 1 INJECTION, POWDER, FOR SOLUTION INTRAMUSCULAR; INTRAVENOUS; PARENTERAL at 11:45

## 2022-01-06 RX ADMIN — Medication 80 MCG: at 11:59

## 2022-01-06 RX ADMIN — SODIUM CHLORIDE: 9 INJECTION, SOLUTION INTRAVENOUS at 11:39

## 2022-01-06 RX ADMIN — Medication 120 MCG: at 11:52

## 2022-01-06 RX ADMIN — FENTANYL CITRATE 50 MCG: 50 INJECTION, SOLUTION INTRAMUSCULAR; INTRAVENOUS at 11:43

## 2022-01-06 RX ADMIN — PROPOFOL 75 MCG/KG/MIN: 10 INJECTION, EMULSION INTRAVENOUS at 11:42

## 2022-01-06 RX ADMIN — Medication 80 MCG: at 11:57

## 2022-01-06 RX ADMIN — TRAMADOL HYDROCHLORIDE 50 MG: 50 TABLET, COATED ORAL at 18:21

## 2022-01-06 RX ADMIN — Medication 120 MCG: at 12:26

## 2022-01-06 RX ADMIN — TRAZODONE HYDROCHLORIDE 100 MG: 50 TABLET ORAL at 19:55

## 2022-01-06 RX ADMIN — Medication 80 MCG: at 12:02

## 2022-01-06 RX ADMIN — Medication 120 MCG: at 12:22

## 2022-01-06 RX ADMIN — Medication 20 MG: at 12:26

## 2022-01-06 RX ADMIN — FENTANYL CITRATE 25 MCG: 50 INJECTION, SOLUTION INTRAMUSCULAR; INTRAVENOUS at 11:58

## 2022-01-06 RX ADMIN — Medication 120 MCG: at 12:44

## 2022-01-06 RX ADMIN — Medication 120 MCG: at 12:16

## 2022-01-06 RX ADMIN — Medication 120 MCG: at 12:12

## 2022-01-06 RX ADMIN — GABAPENTIN 300 MG: 300 CAPSULE ORAL at 19:54

## 2022-01-06 NOTE — PROGRESS NOTES
Cardiac Cath Lab Recovery Arrival Note:      Falguni Elizondo arrived to Cardiac Cath Lab, Recovery Area. Staff introduced to patient. Patient identifiers verified with NAME and DATE OF BIRTH. Procedure verified with patient. Consent forms reviewed and signed by patient or authorized representative and verified. Allergies verified. Patient and family oriented to department. Patient and family informed of procedure and plan of care. Questions answered with review. Patient prepped for procedure, per orders from physician, prior to arrival.    Patient on cardiac monitor, non-invasive blood pressure, SPO2 monitor. On room air. Patient is A&Ox 3. Patient reports no c/o. Patient in stretcher, in low position, with side rails up, call bell within reach, patient instructed to call if assistance as needed. Patient prep in: [address], [place] [address]. Patient family has pager # none  Family in: [address] [place] [address].    Prep by: Miranda Nowak, LUCIE and  Chayo Keene RN

## 2022-01-06 NOTE — H&P
EP/ ARRHYTHMIA      Patient ID:  Patient: Kashmir Dacosta  MRN: [id number]  Age: 68 y.o.  : 1945    Date of  Admission: 2022  9:49 AM   PCP:  Jose R Spencer MD    Assessment: 1. Candidate for primary prevention ICD. Plan:     1. Given the potential benefits, risks, and alternatives, she agrees to proceed. Kashmir Dacosta is a 68 y.o. female with a history of the above here for prophylactic ICD. Past Medical History:   Diagnosis Date    Arthritis     was in legs    Hypertension     Other ill-defined conditions(799.89)     extremety vascular disease    [facility]     PVD (peripheral vascular disease) [facility]         Past Surgical History:   Procedure Laterality Date    COLONOSCOPY N/A 2018    COL[facility] at [facility] ENDOSCOPY    HX AMPUTATION      Right Above Knee Amputation    HX AMPUTATION  2011    Left Below Knee Amputation    HX ORTHOPAEDIC  12     Debridement Left BKA Stump and Placement of Wound [facility]    HX OTHER SURGICAL  10/24/12    SPLIT THICKNESS SKIN GRAFT FROM RIGHT THIGH TO BELOW KNEE AMPUTATION STUMP (to wound left BKA stump    NM VEIN BYPASS GRAFT,FEM-TIBIAL  3/04/2011    Left femoral post. tibial bypass with vein       Social History     Tobacco Use    Smoking status: Former Smoker     Quit date: 2008     Years since quittin.4    Smokeless tobacco: Never Used   Substance Use Topics    Alcohol use: No        Family History   Problem Relation Age of Onset    Hypertension Mother     Hypertension Father     Diabetes Sister     Diabetes Brother         Allergies   Allergen Reactions    Percocet [Oxycodone-Acetaminophen] Nausea and Vomiting        Meds reviewed. Review of Symptoms:  See OV.        Objective:      Physical Exam:  Temp (24hrs), Av.6 °F (37 °C), Min:98.6 °F (37 °C), Max:98.6 °F (37 °C)    Patient Vitals for the past 8 hrs:   Pulse   22 1009 72    Patient Vitals for the past 8 hrs:   Resp   01/06/22 1009 13    Patient Vitals for the past 8 hrs:   BP   01/06/22 1009 (!) 127/49          Intake/Output Summary (Last 24 hours) at 1/6/2022 1258  Last data filed at 1/6/2022 1256  Gross per 24 hour   Intake 500 ml   Output 20 ml   Net 480 ml       Nondiaphoretic, not in acute distress. Neuro grossly nonfocal.  No tremor. Awake and appropriate.       Claudia Gomez MD  1/6/2022

## 2022-01-06 NOTE — Clinical Note
Addended by: GINETTE STUART on: 8/29/2019 11:57 AM     Modules accepted: Orders     Safe sheath split and removed.

## 2022-01-06 NOTE — Clinical Note
TRANSFER - OUT REPORT:     Verbal report given to: LUCIE Castillo, (at bedside). Report consisted of patient's Situation, Background, Assessment and   Recommendations(SBAR). Opportunity for questions and clarification was provided. Patient transported with a Registered Nurse and Oxygen. Oxygen used for patient = @ 2 - 6 Liters. Patient transported to: [facility], 2162.

## 2022-01-06 NOTE — PROGRESS NOTES
TRANSFER - IN REPORT:    Verbal report received from radha kamara and [address] crna on Irving Alfaro  being received from ep lab for routine progression of care. Report consisted of patients Situation, Background, Assessment and Recommendations(SBAR). Information from the following report(s) SBAR was reviewed with the receiving clinician. Opportunity for questions and clarification was provided. Assessment completed upon patients arrival to [facility] and care assumed. Cardiac Cath Lab Recovery Arrival Note:    Irving Alfaro arrived to ivcu 2162. Patient procedure= icd implant. Patient on cardiac monitor, non-invasive blood pressure, SPO2 monitor. On RA. IV  of nacl on pump at kvo ml/hr. Patient status doing well without problems. Patient is A&Ox 4. Patient reports no complaints. PROCEDURE SITE CHECK:    Procedure site:without any bleeding and no hematoma, no pain/discomfort reported at procedure site. No change in patient status. Continue to monitor patient and status.

## 2022-01-06 NOTE — PROGRESS NOTES
Primary Nurse Mayte Flaherty RN and Radha Padron RN performed a dual skin assessment on this patient No impairment noted  Samy score is 19

## 2022-01-06 NOTE — Clinical Note
Left subclavian vein. Accessed successfully. Micropuncture needle used. Using fluoro guidance.  Number of attempts =  1. [id number]

## 2022-01-06 NOTE — Clinical Note
Left internal jugular vein. Accessed successfully. Micropuncture needle used. Using fluoro guidance. Number of attempts =  1.  [id number]

## 2022-01-06 NOTE — PROGRESS NOTES
TRANSFER - OUT REPORT:    Verbal report given to [place] RN(name) on Falguni Elizondo  being transferred to IVCU(unit) for routine progression of care       Report consisted of patients Situation, Background, Assessment and   Recommendations(SBAR). Information from the following report(s) SBAR was reviewed with the receiving nurse. Lines:       Opportunity for questions and clarification was provided.       Patient transported with:   Registered Nurse

## 2022-01-06 NOTE — ANESTHESIA PREPROCEDURE EVALUATION
Anesthetic History   No history of anesthetic complications  PONV          Review of Systems / Medical History  Patient summary reviewed, nursing notes reviewed and pertinent labs reviewed    Pulmonary  Within defined limits                 Neuro/Psych   Within defined limits      Psychiatric history     Cardiovascular    Hypertension      CHF (EF 25-30%)  Dysrhythmias   Past MI, CAD and PAD    Exercise tolerance: <4 METS  Comments: Hx SB    INSERT ICD DUAL   GI/Hepatic/Renal  Within defined limits              Endo/Other  Within defined limits      Arthritis     Other Findings            Physical Exam    Airway  Mallampati: II  TM Distance: 4 - 6 cm  Neck ROM: normal range of motion   Mouth opening: Normal     Cardiovascular  Regular rate and rhythm,  S1 and S2 normal,  no murmur, click, rub, or gallop             Dental    Dentition: Full upper dentures     Pulmonary  Breath sounds clear to auscultation               Abdominal  GI exam deferred       Other Findings            Anesthetic Plan    ASA: 4  Anesthesia type: total IV anesthesia and MAC          Induction: Intravenous  Anesthetic plan and risks discussed with: Patient      Propofol MAC

## 2022-01-07 VITALS
HEART RATE: 64 BPM | DIASTOLIC BLOOD PRESSURE: 55 MMHG | SYSTOLIC BLOOD PRESSURE: 105 MMHG | WEIGHT: 161 LBS | TEMPERATURE: 97.8 F | RESPIRATION RATE: 16 BRPM | OXYGEN SATURATION: 97 % | BODY MASS INDEX: 29.63 KG/M2 | HEIGHT: 62 IN

## 2022-01-07 PROCEDURE — 77010033678 HC OXYGEN DAILY

## 2022-01-07 PROCEDURE — 74011250637 HC RX REV CODE- 250/637: Performed by: INTERNAL MEDICINE

## 2022-01-07 PROCEDURE — 74011000250 HC RX REV CODE- 250: Performed by: INTERNAL MEDICINE

## 2022-01-07 RX ADMIN — CLOPIDOGREL BISULFATE 75 MG: 75 TABLET, FILM COATED ORAL at 09:16

## 2022-01-07 RX ADMIN — ESCITALOPRAM OXALATE 5 MG: 10 TABLET ORAL at 09:16

## 2022-01-07 RX ADMIN — ASPIRIN 81 MG: 81 TABLET, COATED ORAL at 09:15

## 2022-01-07 RX ADMIN — TRAMADOL HYDROCHLORIDE 50 MG: 50 TABLET, COATED ORAL at 11:00

## 2022-01-07 RX ADMIN — SODIUM CHLORIDE, PRESERVATIVE FREE 10 ML: 5 INJECTION INTRAVENOUS at 05:29

## 2022-01-07 RX ADMIN — ATORVASTATIN CALCIUM 40 MG: 40 TABLET, FILM COATED ORAL at 09:16

## 2022-01-07 RX ADMIN — POTASSIUM CHLORIDE 20 MEQ: 1500 TABLET, EXTENDED RELEASE ORAL at 09:16

## 2022-01-07 NOTE — PROGRESS NOTES
Bedside shift change report given to Viviana Felipe RN (oncoming nurse) by Mariajose Ellis RN (offgoing nurse). Report included the following information SBAR, Kardex, Procedure Summary, Intake/Output, MAR, Accordion, Recent Results, Med Rec Status, Cardiac Rhythm NSR, Atrial Paced, Alarm Parameters , Pre Procedure Checklist, Procedure Verification, Quality Measures and Dual Neuro Assessment. Left upper chest ICD insertion site topical skin glue in place, no bleeding and hematoma. Pt. Is asymptomatic and stable vital signs.

## 2022-01-07 NOTE — ROUTINE PROCESS
1730- Bedside report received per [facility] for care. 1015- Pt denies CP SOB. Left chest incision CDI. VSS. Intermittent pacing noted. 1330- Discharge instructions reviewed with pt and daughter in law. Verbal understanding received. PIV removed. Pt assisted to W/C and assisted to private car. Discharged to home.

## 2022-01-07 NOTE — PROGRESS NOTES
Bedside shift change report given to Sinclair Spurling, RN (oncoming nurse) by Gaye Tafoya RN (offgoing nurse). Report included the following information SBAR, Kardex, Procedure Summary, Intake/Output, MAR, Accordion, Recent Results, Med Rec Status, Cardiac Rhythm NSR, Atrial Paced, Alarm Parameters , Pre Procedure Checklist, Procedure Verification, Quality Measures and Dual Neuro Assessment. Left upper chest ICD insertion site topical skin glue in place, no bleeding and hematoma. Vital signs are stable.

## 2022-01-07 NOTE — PROGRESS NOTES
Problem: Falls - Risk of  Goal: *Absence of Falls  Description: Document Line [place] Fall Risk and appropriate interventions in the flowsheet. Outcome: Progressing Towards Goal  Note: Fall Risk Interventions:            Medication Interventions: Assess postural VS orthostatic hypotension,Bed/chair exit alarm,Evaluate medications/consider consulting pharmacy,Patient to call before getting OOB,Teach patient to arise slowly    Elimination Interventions: Call light in reach,Bed/chair exit alarm,Elevated toilet seat,Patient to call for help with toileting needs,Stay With Me (per policy),Toilet paper/wipes in reach,Toileting schedule/hourly rounds              Problem: Patient Education: Go to Patient Education Activity  Goal: Patient/Family Education  Outcome: Progressing Towards Goal     Problem: Pressure Injury - Risk of  Goal: *Prevention of pressure injury  Description: Document Samy Scale and appropriate interventions in the flowsheet. Outcome: Progressing Towards Goal  Note: Pressure Injury Interventions:       Moisture Interventions: Absorbent underpads,Internal/External urinary devices,Apply protective barrier, creams and emollients,Limit adult briefs,Minimize layers,Moisture barrier,Offer toileting Q_hr    Activity Interventions: Assess need for specialty bed,Increase time out of bed,Pressure redistribution bed/mattress(bed type),PT/OT evaluation    Mobility Interventions: HOB 30 degrees or less,PT/OT evaluation,Turn and reposition approx.  every two hours(pillow and wedges)         Friction and Shear Interventions: Apply protective barrier, creams and emollients,Feet elevated on foot rest,Foam dressings/transparent film/skin sealants,Lift sheet,Lift team/patient mobility team,Transferring/repositioning devices                Problem: Patient Education: Go to Patient Education Activity  Goal: Patient/Family Education  Outcome: Progressing Towards Goal     Problem: Pain  Goal: *Control of Pain  Outcome: Progressing Towards Goal  Goal: *PALLIATIVE CARE:  Alleviation of Pain  Outcome: Progressing Towards Goal     Problem: Patient Education: Go to Patient Education Activity  Goal: Patient/Family Education  Outcome: Progressing Towards Goal     Problem: Patient Education: Go to Patient Education Activity  Goal: Patient/Family Education  Outcome: Progressing Towards Goal     Problem: Pacer/ICD: Post-Procedure  Goal: Off Pathway (Use only if patient is Off Pathway)  Outcome: Progressing Towards Goal  Goal: Activity/Safety  Outcome: Progressing Towards Goal  Goal: Consults, if ordered  Outcome: Progressing Towards Goal  Goal: Diagnostic Test/Procedures  Outcome: Progressing Towards Goal  Goal: Nutrition/Diet  Outcome: Progressing Towards Goal  Goal: Discharge Planning  Outcome: Progressing Towards Goal  Goal: Medications  Outcome: Progressing Towards Goal  Goal: Respiratory  Outcome: Progressing Towards Goal  Goal: Treatments/Interventions/Procedures  Outcome: Progressing Towards Goal  Goal: Psychosocial  Outcome: Progressing Towards Goal  Goal: *Hemodynamically stable  Outcome: Progressing Towards Goal  Goal: *Optimal pain control at patient's stated goal  Outcome: Progressing Towards Goal  Goal: *Absence of signs and symptoms of infection or wound complication  Outcome: Progressing Towards Goal

## 2022-01-07 NOTE — DISCHARGE INSTRUCTIONS
Cardiology Discharge Instructions                Patient ID:  Renetta Henning  [id number]  89 y.o.  1945    Admit Date: 1/6/2022    Discharge Date: 1/7/2022   Admitting Physician: Lisle Spurling, MD   Discharge Physician: Lisle Spurling, MD    Cardiology Procedures this Admission:  1. Dual chamber Abbott ICD    Disposition: home with a     FOLLOW-UP:    Call the office [phone] to make an appointment for 2-4 weeks with Dr. Tiffanie Maharaj nurse. [address], [address]    [phone]  [address]    [email or web address]        DISCHARGE INSTRUCTIONS FOR PATIENTS WITH ICD'S    1. Remember to call for an appointment in 2-4 weeks [phone] to check healing and implant programming with Dr. Tiffanie Alejo nurse, [facility]. 2. Medic Alert Bracelets are available from your pharmacist to wear at all times if you choose to wear one. 3. Carry your ID card for your ICD with you at all times. This card will be given to you in the hospital or mailed to you. 4. The ICD will bulge slightly under your skin. The bulge will decrease in size over the next few weeks. Please notify the doctor's office if you notice any of the following around your ICD site:   A.  A bruise that does not go away. B.  Soreness or yellow, green, or brown drainage from the site. C. Any swelling from the site. D. If you have a fever of 100 degrees or higher that lasts for a few days. INCISION CARE     1.  Leave the skin glue over your site until it dissolves on its own, usually in a few weeks. 2.  You may shower after 3 days as long as your incision isnt submerged or directly sprayed upon until well healed. 3.  For comfort, wear loose fitting clothing. 4.  Report any signs of infection, fever, pain, swelling, redness, oozing, or heat at site especially if these symptoms increase after the first 3 to 4 days. ACTIVITY PRECAUTIONS   1. Avoid rough contact with the implant site.   2. No driving for 14 days. 3. Avoid lifting your arm over your head, carrying anything on the affected side, or lifting over 10 pounds for 30 days. For the first 2 days only bend your arm at the elbow. 4. Any extreme activity such as golf, weight lifting or exercise biking should be restricted for 60 days. 5. Do not carry objects by holding them against your implant site. 6.  No shooting rifles or any type of gun with the affected shoulder permanently. 7.  Welding and chainsaws are prohibited. SPECIAL PRECAUTIONS  1. You should avoid all strong magnetic fields, such as arc welding, large transformers, large motors. Some ICD devices will beep if it detects a strong magnet. If this occurs, move out of the area. 2.  You may not have an MRI which uses a strong magnet to take pictures. 3.  Treatments or surgery that requires diathermy or electrocautery should be discussed with your doctor before scheduled. 4.  Avoid radio frequency transmitters, including radar. 5.  Advise dentist or other medical personnel you see that you have an ICD. 6.  Cell phones and microwave oven use is okay. 7.  If you plan to move or take a trip to a new area, the doctor's office will give you a name of a doctor to contact for any problems. SPECIAL INSTRUCTIONS ON SHOCKS   1. Notify your doctor for any of the following:       A. Anytime a shock is received in a 24 hour period. An office visit is not usually required for a single shock. B.  Two or more shocks in a row. If you do not feel well, call the [facility], otherwise call your doctor. This may require an office visit. C. Two or more shocks spaced apart by several hours. This may require an office visit. 2.  Keep a record of events. Include date, time, symptoms and activity at that time. ANTIBIOTIC THERAPY  During the first 8 weeks after your ICD insertion, you may need antibiotics before any dental work or certain tests or operations.   Let the dentist or doctor who is caring for you know that you have had an implanted device.       Signed:  Noreen Henson MD  1/7/2022

## 2022-01-10 NOTE — ANESTHESIA POSTPROCEDURE EVALUATION
Procedure(s):  INSERT ICD DUAL. total IV anesthesia, MAC    Anesthesia Post Evaluation        Patient location during evaluation: PACU  Note status: Adequate. Level of consciousness: responsive to verbal stimuli and sleepy but conscious  Pain management: satisfactory to patient  Airway patency: patent  Anesthetic complications: no  Cardiovascular status: acceptable  Respiratory status: acceptable  Hydration status: acceptable  Comments: +Post-Anesthesia Evaluation and Assessment    Patient: Erica Mckinnon MRN: [id number]  SSN: [id number]   YOB: 1945  Age: 68 y.o. Sex: female      Cardiovascular Function/Vital Signs    BP (!) 105/55   Pulse 64   Temp 36.6 °C (97.8 °F)   Resp 16   Ht 5' 2\" (1.575 m)   Wt 73 kg (161 lb)   SpO2 97%   BMI 29.45 kg/m²     Patient is status post Procedure(s):  INSERT ICD DUAL. Nausea/Vomiting: Controlled. Postoperative hydration reviewed and adequate. Pain:  Pain Scale 1: Numeric (0 - 10) (01/07/22 1241)  Pain Intensity 1: 1 (01/07/22 1241)   Managed. Neurological Status: At baseline. Mental Status and Level of Consciousness: Arousable. Pulmonary Status:   O2 Device: None (Room air) (01/07/22 0830)   Adequate oxygenation and airway patent. Complications related to anesthesia: None    Post-anesthesia assessment completed. No concerns.     Signed By: Karmen Kruger MD    1/10/2022  Post anesthesia nausea and vomiting:  controlled      INITIAL Post-op Vital signs:   Vitals Value Taken Time   /55 01/07/22 1241   Temp 36.6 °C (97.8 °F) 01/07/22 0329   Pulse 64 01/07/22 1241   Resp 16 01/07/22 1241   SpO2 97 % 01/07/22 0727

## 2022-02-15 ENCOUNTER — HOSPITAL ENCOUNTER (EMERGENCY)
Age: 77
Discharge: HOME | End: 2022-02-15
Attending: EMERGENCY MEDICINE
Payer: MEDICARE

## 2022-02-15 ENCOUNTER — APPOINTMENT (OUTPATIENT)
Dept: GENERAL RADIOLOGY | Age: 77
End: 2022-02-15
Attending: EMERGENCY MEDICINE
Payer: MEDICARE

## 2022-02-15 VITALS
BODY MASS INDEX: 29.63 KG/M2 | SYSTOLIC BLOOD PRESSURE: 99 MMHG | OXYGEN SATURATION: 97 % | WEIGHT: 161 LBS | RESPIRATION RATE: 22 BRPM | DIASTOLIC BLOOD PRESSURE: 88 MMHG | TEMPERATURE: 97.2 F | HEIGHT: 62 IN | HEART RATE: 100 BPM

## 2022-02-15 DIAGNOSIS — U07.1 COVID-19: Primary | ICD-10-CM

## 2022-02-15 LAB
ALBUMIN SERPL-MCNC: 3.4 G/DL (ref 3.5–5)
ALBUMIN/GLOB SERPL: 0.9 {RATIO} (ref 1.1–2.2)
ALP SERPL-CCNC: 99 U/L (ref 45–117)
ALT SERPL-CCNC: 35 U/L (ref 12–78)
ANION GAP SERPL CALC-SCNC: 8 MMOL/L (ref 5–15)
APPEARANCE UR: ABNORMAL
AST SERPL-CCNC: 48 U/L (ref 15–37)
BACTERIA URNS QL MICRO: NEGATIVE /HPF
BASOPHILS # BLD: 0 K/UL (ref 0–0.1)
BASOPHILS NFR BLD: 1 % (ref 0–1)
BILIRUB SERPL-MCNC: 0.6 MG/DL (ref 0.2–1)
BILIRUB UR QL: NEGATIVE
BNP SERPL-MCNC: ABNORMAL PG/ML
BUN SERPL-MCNC: 71 MG/DL (ref 6–20)
BUN/CREAT SERPL: 36 (ref 12–20)
CALCIUM SERPL-MCNC: 8.8 MG/DL (ref 8.5–10.1)
CHLORIDE SERPL-SCNC: 101 MMOL/L (ref 97–108)
CO2 SERPL-SCNC: 23 MMOL/L (ref 21–32)
COLOR UR: ABNORMAL
COMMENT, HOLDF: NORMAL
COVID-19 RAPID TEST, COVR: DETECTED
CREAT SERPL-MCNC: 1.96 MG/DL (ref 0.55–1.02)
DIFFERENTIAL METHOD BLD: ABNORMAL
EOSINOPHIL # BLD: 0 K/UL (ref 0–0.4)
EOSINOPHIL NFR BLD: 1 % (ref 0–7)
EPITH CASTS URNS QL MICRO: ABNORMAL /LPF
ERYTHROCYTE [DISTWIDTH] IN BLOOD BY AUTOMATED COUNT: 19.7 % (ref 11.5–14.5)
GLOBULIN SER CALC-MCNC: 3.8 G/DL (ref 2–4)
GLUCOSE SERPL-MCNC: 88 MG/DL (ref 65–100)
GLUCOSE UR STRIP.AUTO-MCNC: NEGATIVE MG/DL
HCT VFR BLD AUTO: 32.9 % (ref 35–47)
HGB BLD-MCNC: 11 G/DL (ref 11.5–16)
HGB UR QL STRIP: NEGATIVE
HYALINE CASTS URNS QL MICRO: ABNORMAL /LPF (ref 0–5)
IMM GRANULOCYTES # BLD AUTO: 0 K/UL (ref 0–0.04)
IMM GRANULOCYTES NFR BLD AUTO: 0 % (ref 0–0.5)
KETONES UR QL STRIP.AUTO: NEGATIVE MG/DL
LACTATE BLD-SCNC: 2.47 MMOL/L (ref 0.4–2)
LEUKOCYTE ESTERASE UR QL STRIP.AUTO: ABNORMAL
LYMPHOCYTES # BLD: 1.3 K/UL (ref 0.8–3.5)
LYMPHOCYTES NFR BLD: 28 % (ref 12–49)
MCH RBC QN AUTO: 26.7 PG (ref 26–34)
MCHC RBC AUTO-ENTMCNC: 33.4 G/DL (ref 30–36.5)
MCV RBC AUTO: 79.9 FL (ref 80–99)
MONOCYTES # BLD: 0.6 K/UL (ref 0–1)
MONOCYTES NFR BLD: 13 % (ref 5–13)
MUCOUS THREADS URNS QL MICRO: ABNORMAL /LPF
NEUTS SEG # BLD: 2.6 K/UL (ref 1.8–8)
NEUTS SEG NFR BLD: 57 % (ref 32–75)
NITRITE UR QL STRIP.AUTO: NEGATIVE
NRBC # BLD: 0.05 K/UL (ref 0–0.01)
NRBC BLD-RTO: 1.1 PER 100 WBC
PH UR STRIP: 5 [PH] (ref 5–8)
PLATELET # BLD AUTO: 175 K/UL (ref 150–400)
PMV BLD AUTO: 11.6 FL (ref 8.9–12.9)
POTASSIUM SERPL-SCNC: 5.2 MMOL/L (ref 3.5–5.1)
PROT SERPL-MCNC: 7.2 G/DL (ref 6.4–8.2)
PROT UR STRIP-MCNC: 30 MG/DL
RBC # BLD AUTO: 4.12 M/UL (ref 3.8–5.2)
RBC #/AREA URNS HPF: ABNORMAL /HPF (ref 0–5)
SAMPLES BEING HELD,HOLD: NORMAL
SODIUM SERPL-SCNC: 132 MMOL/L (ref 136–145)
SOURCE, COVRS: ABNORMAL
SP GR UR REFRACTOMETRY: 1.02 (ref 1–1.03)
TSH SERPL DL<=0.05 MIU/L-ACNC: 3.89 UIU/ML (ref 0.36–3.74)
UA: UC IF INDICATED,UAUC: ABNORMAL
UROBILINOGEN UR QL STRIP.AUTO: 1 EU/DL (ref 0.2–1)
WBC # BLD AUTO: 4.6 K/UL (ref 3.6–11)
WBC URNS QL MICRO: ABNORMAL /HPF (ref 0–4)

## 2022-02-15 PROCEDURE — 87086 URINE CULTURE/COLONY COUNT: CPT

## 2022-02-15 PROCEDURE — 74011250636 HC RX REV CODE- 250/636: Performed by: EMERGENCY MEDICINE

## 2022-02-15 PROCEDURE — 84443 ASSAY THYROID STIM HORMONE: CPT

## 2022-02-15 PROCEDURE — 96374 THER/PROPH/DIAG INJ IV PUSH: CPT

## 2022-02-15 PROCEDURE — 36415 COLL VENOUS BLD VENIPUNCTURE: CPT

## 2022-02-15 PROCEDURE — 96361 HYDRATE IV INFUSION ADD-ON: CPT

## 2022-02-15 PROCEDURE — 93005 ELECTROCARDIOGRAM TRACING: CPT

## 2022-02-15 PROCEDURE — 87147 CULTURE TYPE IMMUNOLOGIC: CPT

## 2022-02-15 PROCEDURE — 87635 SARS-COV-2 COVID-19 AMP PRB: CPT

## 2022-02-15 PROCEDURE — 71045 X-RAY EXAM CHEST 1 VIEW: CPT

## 2022-02-15 PROCEDURE — 99285 EMERGENCY DEPT VISIT HI MDM: CPT

## 2022-02-15 PROCEDURE — 83880 ASSAY OF NATRIURETIC PEPTIDE: CPT

## 2022-02-15 PROCEDURE — 83605 ASSAY OF LACTIC ACID: CPT

## 2022-02-15 PROCEDURE — 80053 COMPREHEN METABOLIC PANEL: CPT

## 2022-02-15 PROCEDURE — 81001 URINALYSIS AUTO W/SCOPE: CPT

## 2022-02-15 PROCEDURE — 85025 COMPLETE CBC W/AUTO DIFF WBC: CPT

## 2022-02-15 RX ORDER — GUAIFENESIN 600 MG/1
600 TABLET, EXTENDED RELEASE ORAL 2 TIMES DAILY
Qty: 15 TABLET | Refills: 0 | Status: SHIPPED | OUTPATIENT
Start: 2022-02-15

## 2022-02-15 RX ORDER — ALBUTEROL SULFATE 90 UG/1
2 AEROSOL, METERED RESPIRATORY (INHALATION)
Qty: 1 EACH | Refills: 0 | Status: SHIPPED | OUTPATIENT
Start: 2022-02-15

## 2022-02-15 RX ORDER — FUROSEMIDE 10 MG/ML
40 INJECTION INTRAMUSCULAR; INTRAVENOUS
Status: COMPLETED | OUTPATIENT
Start: 2022-02-15 | End: 2022-02-15

## 2022-02-15 RX ADMIN — FUROSEMIDE 40 MG: 10 INJECTION, SOLUTION INTRAMUSCULAR; INTRAVENOUS at 15:39

## 2022-02-15 RX ADMIN — SODIUM CHLORIDE 500 ML: 9 INJECTION, SOLUTION INTRAVENOUS at 13:33

## 2022-02-15 NOTE — ED NOTES
Pt arrives from home with cc of intermittent SOB and generally \"not feeling well\" for several days. Pt states she feels weak and tired. Pt has hx of CHF and states that the SOB is mild, and comes and goes. Pt has a left AKA and a right BKA, states it is due to \"poor circulation\". Pt denies any pain. Noted low temp, MD aware. Multiple warm blankets placed on pt. BP are labile, MD also aware. Pt family states BP is normally soft, lower 143K systolic typically. Pt recently had an ICD placed. Pt placed on monitor x3, call light in reach.     Pt placed on [employer]

## 2022-02-15 NOTE — ED PROVIDER NOTES
EMERGENCY DEPARTMENT HISTORY AND PHYSICAL EXAM      Date: 2/15/2022  Patient Name: Mery Shelton    History of Presenting Illness     Chief Complaint   Patient presents with    Shortness of Breath     Known HF. She has not been feeling well for a couple of days at least.    Cough    Chest Congestion       History Provided By: Patient and Patient's Daughter    HPI: Mery Shelton, 68 y.o. female with PMHx significant for hypertension, peripheral vascular disease, peripheral arterial disease, CAD status post pacer/ICD placement. Who presents with a chief complaint of feeling generally unwell. Patient states \"I feel rough\" but does not further elaborate. She has had intermittent cough for about 2 weeks but no fever. Daughter reports generally poor appetite but that this seemed worse recently. PCP: Anh Dumont MD    There are no other complaints, changes, or physical findings at this time. Current Outpatient Medications   Medication Sig Dispense Refill    guaiFENesin ER (Mucinex) 600 mg ER tablet Take 1 Tablet by mouth two (2) times a day. 15 Tablet 0    albuterol (PROVENTIL HFA, VENTOLIN HFA, PROAIR HFA) 90 mcg/actuation inhaler Take 2 Puffs by inhalation every four (4) hours as needed for Wheezing. 1 Each 0    escitalopram oxalate (LEXAPRO) 5 mg tablet Take 5 mg by mouth daily.  traZODone (DESYREL) 50 mg tablet Take 100 mg by mouth nightly.  carvediloL (Coreg) 12.5 mg tablet Take 0.5 Tablets by mouth two (2) times daily (with meals). 60 Tablet 0    sacubitril-valsartan (ENTRESTO) 49 mg/51 mg tablet Take 1 Tablet by mouth every twelve (12) hours. 30 Tablet 0    furosemide (LASIX) 40 mg tablet Take 1 Tablet by mouth daily. (Patient taking differently: Take 40 mg by mouth two (2) times a day.) 30 Tablet 0    potassium chloride (K-DUR, KLOR-CON) 20 mEq tablet Take 0.5 Tabs by mouth daily. 60 Tab 0    gabapentin (NEURONTIN) 300 mg capsule Take 300 mg by mouth as needed for Pain.  aspirin delayed-release 81 mg tablet Take  by mouth daily.  clopidogreL (PLAVIX) 75 mg tab Take 1 Tab by mouth daily. 30 Tab 12    atorvastatin (Lipitor) 40 mg tablet Take 40 mg by mouth daily. Past History     Past Medical History:  Past Medical History:   Diagnosis Date    Arthritis     was in legs    Hypertension     Other ill-defined conditions(799.89)     extremety vascular disease    [facility] [address]     PVD (peripheral vascular disease) ([facility] [address]      Past Surgical History:  Past Surgical History:   Procedure Laterality Date    COLONOSCOPY N/A 2018    COLONOSCOPY performed by Lieutenant Bello [facility] at [facility] ENDOSCOPY    HX AMPUTATION      Right Above Knee Amputation    HX AMPUTATION  2011    Left Below Knee Amputation    HX ORTHOPAEDIC  12     Debridement Left BKA Stump and Placement of Wound [facility]    HX OTHER SURGICAL  10/24/12    SPLIT THICKNESS SKIN GRAFT FROM RIGHT THIGH TO BELOW KNEE AMPUTATION STUMP (to wound left BKA stump    OR VEIN BYPASS GRAFT,FEM-TIBIAL  3/04/2011    Left femoral post. tibial bypass with vein     Family History:  Family History   Problem Relation Age of Onset    Hypertension Mother     Hypertension Father     Diabetes Sister     Diabetes Brother      Social History:  Social History     Tobacco Use    Smoking status: Former Smoker     Quit date: 2008     Years since quittin.5    Smokeless tobacco: Never Used   Vaping Use    Vaping Use: Never used   Substance Use Topics    Alcohol use: No    Drug use: No     Allergies: Allergies   Allergen Reactions    Percocet [Oxycodone-Acetaminophen] Nausea and Vomiting     Review of Systems   Review of Systems   Constitutional: Positive for fatigue. Negative for chills and fever. HENT: Negative for congestion, rhinorrhea and sore throat. Respiratory: Positive for cough. Negative for shortness of breath. Cardiovascular: Negative for chest pain.    Gastrointestinal: Negative for abdominal pain, nausea and vomiting. Genitourinary: Negative for dysuria and urgency. Skin: Negative for rash. Neurological: Negative for dizziness, light-headedness and headaches. All other systems reviewed and are negative. Physical Exam   Physical Exam  Vitals and nursing note reviewed. Constitutional:       General: She is not in acute distress. Appearance: She is well-developed. HENT:      Head: Normocephalic and atraumatic. Eyes:      Conjunctiva/sclera: Conjunctivae normal.      Pupils: Pupils are equal, round, and reactive to light. Cardiovascular:      Rate and Rhythm: Normal rate and regular rhythm. Pulmonary:      Effort: Pulmonary effort is normal. No respiratory distress. Breath sounds: Normal breath sounds. No stridor. Abdominal:      General: There is no distension. Palpations: Abdomen is soft. Tenderness: There is no abdominal tenderness. Musculoskeletal:         General: Normal range of motion. Cervical back: Normal range of motion. Comments: Left BKA, right AKA   Skin:     General: Skin is warm and dry. Neurological:      Mental Status: She is alert and oriented to person, place, and time. Diagnostic Study Results   Labs -     Recent Results (from the past 12 hour(s))   CBC WITH AUTOMATED DIFF    Collection Time: 02/15/22 12:47 PM   Result Value Ref Range    WBC 4.6 3.6 - 11.0 K/uL    RBC 4.12 3.80 - 5.20 M/uL    HGB 11.0 (L) 11.5 - 16.0 g/dL    HCT 32.9 (L) 35.0 - 47.0 %    MCV 79.9 (L) 80.0 - 99.0 FL    MCH 26.7 26.0 - 34.0 PG    MCHC 33.4 30.0 - 36.5 g/dL    RDW 19.7 (H) 11.5 - 14.5 %    PLATELET 338 213 - 853 K/uL    MPV 11.6 8.9 - 12.9 FL    NRBC 1.1 (H) 0  WBC    ABSOLUTE NRBC 0.05 (H) 0.00 - 0.01 K/uL    NEUTROPHILS 57 32 - 75 %    LYMPHOCYTES 28 12 - 49 %    MONOCYTES 13 5 - 13 %    EOSINOPHILS 1 0 - 7 %    BASOPHILS 1 0 - 1 %    IMMATURE GRANULOCYTES 0 0.0 - 0.5 %    ABS.  NEUTROPHILS 2.6 1.8 - 8.0 K/UL ABS. LYMPHOCYTES 1.3 0.8 - 3.5 K/UL    ABS. MONOCYTES 0.6 0.0 - 1.0 K/UL    ABS. EOSINOPHILS 0.0 0.0 - 0.4 K/UL    ABS. BASOPHILS 0.0 0.0 - 0.1 K/UL    ABS. IMM. GRANS. 0.0 0.00 - 0.04 K/UL    DF AUTOMATED     METABOLIC PANEL, COMPREHENSIVE    Collection Time: 02/15/22 12:47 PM   Result Value Ref Range    Sodium 132 (L) 136 - 145 mmol/L    Potassium 5.2 (H) 3.5 - 5.1 mmol/L    Chloride 101 97 - 108 mmol/L    CO2 23 21 - 32 mmol/L    Anion gap 8 5 - 15 mmol/L    Glucose 88 65 - 100 mg/dL    BUN 71 (H) 6 - 20 MG/DL    Creatinine 1.96 (H) 0.55 - 1.02 MG/DL    BUN/Creatinine ratio 36 (H) 12 - 20      GFR est AA 30 (L) >60 ml/min/1.73m2    GFR est non-AA 25 (L) >60 ml/min/1.73m2    Calcium 8.8 8.5 - 10.1 MG/DL    Bilirubin, total 0.6 0.2 - 1.0 MG/DL    ALT (SGPT) 35 12 - 78 U/L    AST (SGOT) 48 (H) 15 - 37 U/L    Alk. phosphatase 99 45 - 117 U/L    Protein, total 7.2 6.4 - 8.2 g/dL    Albumin 3.4 (L) 3.5 - 5.0 g/dL    Globulin 3.8 2.0 - 4.0 g/dL    A-G Ratio 0.9 (L) 1.1 - 2.2     NT-PRO BNP    Collection Time: 02/15/22 12:47 PM   Result Value Ref Range    NT pro-BNP >35,000 (H) <450 PG/ML   SAMPLES BEING HELD    Collection Time: 02/15/22 12:47 PM   Result Value Ref Range    SAMPLES BEING HELD  BLUE, PST     COMMENT        Add-on orders for these samples will be processed based on acceptable specimen integrity and analyte stability, which may vary by analyte.    TSH 3RD GENERATION    Collection Time: 02/15/22 12:47 PM   Result Value Ref Range    TSH 3.89 (H) 0.36 - 3.74 uIU/mL   POC LACTIC ACID    Collection Time: 02/15/22 12:51 PM   Result Value Ref Range    Lactic Acid (POC) 2.47 (HH) 0.40 - 2.00 mmol/L   EKG, 12 LEAD, INITIAL    Collection Time: 02/15/22  1:07 PM   Result Value Ref Range    Ventricular Rate 60 BPM    Atrial Rate 60 BPM    P-R Interval 280 ms    QRS Duration 84 ms    Q-T Interval 460 ms    QTC Calculation (Bezet) 460 ms    Calculated P Axis 85 degrees    Calculated R Axis 117 degrees    Calculated T Axis -178 degrees    Diagnosis       Atrial-paced rhythm with prolonged AV conduction  Right axis deviation  Pulmonary disease pattern  T wave abnormality, consider inferior ischemia  When compared with ECG of 16-MAY-2021 04:01,  Electronic atrial pacemaker has replaced Sinus rhythm  QRS axis shifted right  T wave inversion now evident in Inferior leads     COVID-19 RAPID TEST    Collection Time: 02/15/22  1:34 PM   Result Value Ref Range    Specimen source Nasopharyngeal      COVID-19 rapid test Detected (A) NOTD     URINALYSIS W/ REFLEX CULTURE    Collection Time: 02/15/22  5:11 PM    Specimen: Urine   Result Value Ref Range    Color YELLOW/STRAW      Appearance CLOUDY (A) CLEAR      Specific gravity 1.016 1.003 - 1.030      pH (UA) 5.0 5.0 - 8.0      Protein 30 (A) NEG mg/dL    Glucose Negative NEG mg/dL    Ketone Negative NEG mg/dL    Bilirubin Negative NEG      Blood Negative NEG      Urobilinogen 1.0 0.2 - 1.0 EU/dL    Nitrites Negative NEG      Leukocyte Esterase MODERATE (A) NEG      WBC 20-50 0 - 4 /hpf    RBC 0-5 0 - 5 /hpf    Epithelial cells FEW FEW /lpf    Bacteria Negative NEG /hpf    UA:UC IF INDICATED URINE CULTURE ORDERED (A) CNI      Mucus 1+ (A) NEG /lpf    Hyaline cast 5-10 0 - 5 /lpf       Radiologic Studies -   XR CHEST PORT   Final Result   Mild pulmonary edema        XR CHEST PORT    Result Date: 2/15/2022  Mild pulmonary edema    Medical Decision Making   I am the first provider for this patient. I reviewed the vital signs, available nursing notes, past medical history, past surgical history, family history and social history. Vital Signs-Reviewed the patient's vital signs.   Patient Vitals for the past 12 hrs:   Temp Pulse Resp BP SpO2   02/15/22 1807 97.2 °F (36.2 °C)       02/15/22 1752  100 22 99/88 97 %   02/15/22 1745    97/61    02/15/22 1737  60 20 97/61 98 %   02/15/22 1652  61 11 97/62 93 %   02/15/22 1645  61 21 (!) 84/56 94 %   02/15/22 1529 (!) 95.3 °F (35.2 °C)       02/15/22 1444  60 15 (!) 118/90 96 %   02/15/22 1440  60 13  97 %   02/15/22 1429  61 16 94/61    02/15/22 1414  60 23 100/78    02/15/22 1330  60 21 99/62    02/15/22 1323  60 14 108/61    02/15/22 1314  61 20 (!) 85/65    02/15/22 1308  61 19 104/65    02/15/22 1238  60 16 (!) 160/118    02/15/22 1235  67  (!) 160/118    02/15/22 1232 (!) 95 °F (35 °C)       02/15/22 1214   20 (!) 85/55        Pulse Oximetry Analysis - 97% on ra    Cardiac Monitor:   Rate: 60 bpm  Rhythm: Paced      ED EKG interpretation:  Rhythm: paced; and regular . Rate (approx.): 60; Axis: right axis deviation; Other findings: abnormal ekg. This EKG was interpreted by BAYLEE Sifuentes MD,ED Provider. Records Reviewed: Nursing Notes and Old Medical Records    Provider Notes (Medical Decision Making):   Patient presents with chief complaint of feeling generally unwell as well as cough. On exam she is nontoxic-appearing with blood pressures around the 100s. This is her baseline blood pressure. Differential includes pneumonia, COVID-19, UTI, electrolyte imbalance. Will check basic labs, urinalysis, chest x-ray, Covid swab. Will give a small bolus of IV fluids    ED Course:   Initial assessment performed. The patients presenting problems have been discussed, and they are in agreement with the care plan formulated and outlined with them. I have encouraged them to ask questions as they arise throughout their visit. Covid swab positive. Blood pressure has stabilized in the mid to high 90s which I suspect is the patient's baseline. She remains nonhypoxic. Stable for discharge home with strict ED return precautions         Procedures:  Procedures    Critical Care:  none    Disposition:  Discharge Note:  The patient has been re-evaluated and is ready for discharge. Reviewed available results with patient. Counseled patient on diagnosis and care plan.  Patient has expressed understanding, and all questions have been answered. Patient agrees with plan and agrees to follow up as recommended, or to return to the ED if their symptoms worsen. Discharge instructions have been provided and explained to the patient, along with reasons to return to the ED. PLAN:  1. Discharge Medication List as of 2/15/2022  6:09 PM      START taking these medications    Details   guaiFENesin ER (Mucinex) 600 mg ER tablet Take 1 Tablet by mouth two (2) times a day., Normal, Disp-15 Tablet, R-0      albuterol (PROVENTIL HFA, VENTOLIN HFA, PROAIR HFA) 90 mcg/actuation inhaler Take 2 Puffs by inhalation every four (4) hours as needed for Wheezing., Normal, Disp-1 Each, R-0         CONTINUE these medications which have NOT CHANGED    Details   clopidogreL (PLAVIX) 75 mg tab Take 1 Tab by mouth daily. , Print, Disp-30 Tab, R-12      escitalopram oxalate (LEXAPRO) 5 mg tablet Take 5 mg by mouth daily. , Historical Med      traZODone (DESYREL) 50 mg tablet Take 100 mg by mouth nightly., Historical Med      carvediloL (Coreg) 12.5 mg tablet Take 0.5 Tablets by mouth two (2) times daily (with meals). , Normal, Disp-60 Tablet, R-0      sacubitril-valsartan (ENTRESTO) 49 mg/51 mg tablet Take 1 Tablet by mouth every twelve (12) hours. , Normal, Disp-30 Tablet, R-0      furosemide (LASIX) 40 mg tablet Take 1 Tablet by mouth daily. , Normal, Disp-30 Tablet, R-0      potassium chloride (K-DUR, KLOR-CON) 20 mEq tablet Take 0.5 Tabs by mouth daily. , Normal, Disp-60 Tab, R-0      gabapentin (NEURONTIN) 300 mg capsule Take 300 mg by mouth as needed for Pain., Historical Med      aspirin delayed-release 81 mg tablet Take  by mouth daily. , Historical Med      atorvastatin (Lipitor) 40 mg tablet Take 40 mg by mouth daily. , Historical Med           2.    Follow-up Information     Follow up With Specialties Details Why Contact Info    Anh Dumont MD Family Medicine Schedule an appointment as soon as possible for a visit   [address] [place] [id number]  [phone]      [facility] EMERGENCY DEPT Emergency Medicine  As needed, If symptoms worsen [address]  [address] [facility]  [phone]        Return to ED if worse     Diagnosis     Clinical Impression:   1. COVID-19            Please note that this dictation was completed with [employer], the computer voice recognition software. Quite often unanticipated grammatical, syntax, homophones, and other interpretive errors are inadvertently transcribed by the computer software. Please disregard these errors.   Please excuse any errors that have escaped final proofreading

## 2022-02-15 NOTE — ED NOTES
Unable to get a temp, pulse rate or sat in triage - attempted forehead, fingers, axillary, oral. BP in the 80's

## 2022-02-16 ENCOUNTER — PATIENT OUTREACH (OUTPATIENT)
Dept: CASE MANAGEMENT | Age: 77
End: 2022-02-16

## 2022-02-16 LAB
ATRIAL RATE: 60 BPM
BACTERIA SPEC CULT: ABNORMAL
BACTERIA SPEC CULT: ABNORMAL
CALCULATED P AXIS, ECG09: 85 DEGREES
CALCULATED R AXIS, ECG10: 117 DEGREES
CALCULATED T AXIS, ECG11: -178 DEGREES
CC UR VC: ABNORMAL
DIAGNOSIS, 93000: NORMAL
P-R INTERVAL, ECG05: 280 MS
Q-T INTERVAL, ECG07: 460 MS
QRS DURATION, ECG06: 84 MS
QTC CALCULATION (BEZET), ECG08: 460 MS
SERVICE CMNT-IMP: ABNORMAL
VENTRICULAR RATE, ECG03: 60 BPM

## 2022-02-16 NOTE — PROGRESS NOTES
[facility] ED follow up call made. Spoke to patient's DIL who states patient passed away last night. No further outreach scheduled.

## 2022-03-18 PROBLEM — I50.9 ACUTE EXACERBATION OF CHF (CONGESTIVE HEART FAILURE) (HCC): Status: ACTIVE | Noted: 2021-05-16

## 2022-03-18 PROBLEM — Z95.810 ICD (IMPLANTABLE CARDIOVERTER-DEFIBRILLATOR) IN PLACE: Status: ACTIVE | Noted: 2022-01-06

## 2022-03-18 PROBLEM — R63.0 ANOREXIA: Status: ACTIVE | Noted: 2021-03-28

## 2022-03-19 PROBLEM — R10.9 ABDOMINAL PAIN: Status: ACTIVE | Noted: 2021-03-28

## 2022-03-19 PROBLEM — I21.02 STEMI INVOLVING LEFT ANTERIOR DESCENDING CORONARY ARTERY (HCC): Status: ACTIVE | Noted: 2020-05-08

## 2022-03-19 PROBLEM — I21.3 STEMI (ST ELEVATION MYOCARDIAL INFARCTION) (HCC): Status: ACTIVE | Noted: 2020-05-08

## 2022-03-20 PROBLEM — I50.9 CHF EXACERBATION (HCC): Status: ACTIVE | Noted: 2021-03-28

## 2022-03-27 NOTE — PROCEDURES
L cath     R Radial access. Acute Anterior STEMI. Possible Covid infection . Acute hypoxia and Acidosis. Acute Respiratory failure with hypoxia and hypercapnia    Severe peripheral vascular disease. Previous bilateral lower extremity surgery , with L BKA and R AKA . PCI of LAD   PCI of Circumflex  PCI of RCA     LAD with 99% proximal stenosis ,  LIANA 1 flow. PCI with 2 overlapping Martinez stents , dilated to 2.75 mm with NC balloon to 15 MELANY . 0% residual.    LIANA 3. Proximal Cx with 80 % stenosis . LIANA 3. PCI with Martinez LATONYA to 2.5 mm  At 15 MELANY .   0% . LIANA 3. Prox RCA with 90 % stenosis. LIANA 3. PCI with 2 overlapping [place] LATONYA to 0% residual    LIANA 3. LV EF  30 %     LVEDP 40 . Pt on Seb synephrine with stable BP . She had A Fib during the RCA intervention , given bolus of Amiodarone and converted back to NSR>     Not on B Blocker here due to shock , hypotension . Son [phone],     Condition , procedure and pt status reviewed with tracy. [facility] ED  Emergency Department Encounter  EmergencyMedicine Resident     Pt Name:Ashly Silva  MRN: [id number]  [place] 1962  Date of evaluation: 3/26/22  PCP:  CARMELA Gonsales    CHIEF COMPLAINT       Chief Complaint   Patient presents with    Chest Pain     left side       HISTORY OF PRESENT ILLNESS  (Location/Symptom, Timing/Onset, Context/Setting, Quality, Duration, Modifying Factors, Severity.)      Bethany Graff is a 61 y.o. male who presents with acute onset left-sided chest pain with associated shortness of breath, anxiety and diaphoresis. Patient reports his pain began suddenly and was not relieved much by resting. Denies any prior cardiac history with exception of hypertension. Denies any injury/trauma. No known recent illness    PAST MEDICAL / SURGICAL / SOCIAL / FAMILY HISTORY      has a past medical history of Cancer ([address] Hematuria, Hypertension, Intermittent abdominal pain, Nerve injury, DILAN (obstructive sleep apnea), and Prediabetes. has a past surgical history that includes Appendectomy; Cystoscopy (N/A, 02/07/2019); lymph node dissection (Right, 2004); Septoplasty; Prostate biopsy (N/A, 03/21/2019); Prostatectomy (N/A, 05/31/2019); Cystoscopy (N/A, 05/28/2020); Circumcision (N/A, 07/10/2020); Colonoscopy (N/A, 02/01/2021); hernia repair (06/25/2021); and hernia repair (N/A, 6/25/2021).       Social History     Socioeconomic History    Marital status: Single     Spouse name: Not on file    Number of children: Not on file    Years of education: Not on file    Highest education level: Not on file   Occupational History    Not on file   Tobacco Use    Smoking status: Never Smoker    Smokeless tobacco: Never Used   Vaping Use    Vaping Use: Never used   Substance and Sexual Activity    Alcohol use: No    Drug use: No    Sexual activity: Yes     Partners: Female     Birth control/protection: Condom   Other Topics Concern    Not on file Social History Narrative    Not on file     Social Determinants of Health     Financial Resource Strain:     Difficulty of Paying Living Expenses: Not on file   Food Insecurity:     Worried About Running Out of Food in the Last Year: Not on file    Priya of Food in the Last Year: Not on file   Transportation Needs:     Lack of Transportation (Medical): Not on file    Lack of Transportation (Non-Medical): Not on file   Physical Activity:     Days of Exercise per Week: Not on file    Minutes of Exercise per Session: Not on file   Stress:     Feeling of Stress : Not on file   Social Connections:     Frequency of Communication with Friends and Family: Not on file    Frequency of Social Gatherings with Friends and Family: Not on file    Attends Latter day Services: Not on file    Active Member of Clubs or Organizations: Not on file    Attends Club or Organization Meetings: Not on file    Marital Status: Not on file   Intimate Partner Violence:     Fear of Current or Ex-Partner: Not on file    Emotionally Abused: Not on file    Physically Abused: Not on file    Sexually Abused: Not on file   Housing Stability:     Unable to Pay for Housing in the Last Year: Not on file    Number of Places Lived in the Last Year: Not on file    Unstable Housing in the Last Year: Not on file       Family History   Problem Relation Age of Onset    High Blood Pressure Mother     No Known Problems Father     Lupus Sister     High Blood Pressure Sister     No Known Problems Brother     No Known Problems Maternal Grandmother     No Known Problems Maternal Grandfather     No Known Problems Paternal Grandmother     No Known Problems Paternal Grandfather     No Known Problems Sister     No Known Problems Sister     No Known Problems Sister     No Known Problems Brother     No Known Problems Brother     No Known Problems Brother        Allergies:  Patient has no known allergies.     Home Medications:  Prior to Admission medications    Medication Sig Start Date End Date Taking? Authorizing Provider   docusate sodium (COLACE) 100 MG capsule Take 1 capsule by mouth daily as needed for Constipation 6/25/21   Bryon Yoo DO   vitamin D (ERGOCALCIFEROL) 1.25 MG (98913 UT) CAPS capsule TK ONE CAPSULE PO ONCE A WEEK  Patient not taking: Reported on 11/19/2021 11/22/20   Historical Provider, MD   Multiple Vitamins-Minerals (THERAPEUTIC MULTIVITAMIN-MINERALS) tablet Take 1 tablet by mouth daily    Historical Provider, MD   lisinopril-hydroCHLOROthiazide (PRINZIDE;ZESTORETIC) 20-12.5 MG per tablet Take 1 tablet by mouth daily    Historical Provider, MD       REVIEW OF SYSTEMS    (2-9 systems for level 4, 10 or more for level 5)      Review of Systems   Constitutional: Positive for diaphoresis and fatigue. Negative for fever. Respiratory: Positive for shortness of breath. Negative for cough and wheezing. Cardiovascular: Positive for chest pain and palpitations. Gastrointestinal: Positive for nausea. Negative for abdominal pain and vomiting. Genitourinary: Negative for dysuria and flank pain. Skin: Negative for rash and wound. Neurological: Positive for light-headedness. Negative for syncope, weakness and headaches. Psychiatric/Behavioral: Negative for confusion. PHYSICAL EXAM   (up to 7 for level 4, 8 or more for level 5)      INITIAL VITALS:   /71   Pulse 71   Temp 97.8 °F (36.6 °C) (Oral)   Resp 26   Ht 5' 8\" (1.727 m)   Wt 240 lb (108.9 kg)   SpO2 98%   BMI 36.49 kg/m²     Physical Exam  Constitutional:       Appearance: He is ill-appearing and diaphoretic. HENT:      Head: Normocephalic and atraumatic. Eyes:      Extraocular Movements: Extraocular movements intact. Pupils: Pupils are equal, round, and reactive to light. Cardiovascular:      Rate and Rhythm: Normal rate. Heart sounds: No murmur heard. No friction rub. No gallop.     Pulmonary:      Effort: No respiratory distress. Breath sounds: No wheezing, rhonchi or rales. Abdominal:      General: There is no distension. Tenderness: There is no abdominal tenderness. Musculoskeletal:      Right lower leg: No edema. Left lower leg: No edema. Skin:     Findings: No bruising, lesion or rash. Neurological:      Motor: No weakness.       Gait: Gait normal.         DIFFERENTIAL  DIAGNOSIS     PLAN (LABS / IMAGING / EKG):  Orders Placed This Encounter   Procedures    COVID-19, Rapid    XR CHEST PORTABLE    CT CHEST PULMONARY EMBOLISM W CONTRAST    CBC with Auto Differential    Comprehensive Metabolic Panel w/ Reflex to MG    Troponin    Brain Natriuretic Peptide    Magnesium    Carboxyhemoglobin    EKG 12 Lead    Place in Observation Service    Place in Observation Service       MEDICATIONS ORDERED:  Orders Placed This Encounter   Medications    aspirin chewable tablet 324 mg    ondansetron (ZOFRAN) injection 4 mg    nitroGLYCERIN (NITROSTAT) SL tablet 0.4 mg    iopamidol (ISOVUE-370) 76 % injection 75 mL       DDX: STEMI, NSTEMI, pulmonary embolism, bronchitis, pneumonia, pericardial effusion    DIAGNOSTIC RESULTS / EMERGENCY DEPARTMENT COURSE / MDM   LAB RESULTS:  Results for orders placed or performed during the hospital encounter of 03/26/22   CBC with Auto Differential   Result Value Ref Range    WBC 10.9 3.5 - 11.3 k/uL    RBC 4.83 4.21 - 5.77 m/uL    Hemoglobin 14.1 13.0 - 17.0 g/dL    Hematocrit 40.9 40.7 - 50.3 %    MCV 84.7 82.6 - 102.9 fL    MCH 29.2 25.2 - 33.5 pg    MCHC 34.5 28.4 - 34.8 g/dL    RDW 12.6 11.8 - 14.4 %    Platelets 578 786 - 830 k/uL    MPV 11.1 8.1 - 13.5 fL    NRBC Automated 0.0 0.0 per 100 WBC    Seg Neutrophils 51 36 - 65 %    Lymphocytes 38 24 - 43 %    Monocytes 8 3 - 12 %    Eosinophils % 2 1 - 4 %    Basophils 1 0 - 2 %    Immature Granulocytes 0 0 %    Segs Absolute 5.71 1.50 - 8.10 k/uL    Absolute Lymph # 4.12 (H) 1.10 - 3.70 k/uL    Absolute Mono # 0.84 0.10 - 1.20 k/uL    Absolute Eos # 0.17 0.00 - 0.44 k/uL    Basophils Absolute 0.06 0.00 - 0.20 k/uL    Absolute Immature Granulocyte 0.03 0.00 - 0.30 k/uL   Comprehensive Metabolic Panel w/ Reflex to MG   Result Value Ref Range    Glucose 145 (H) 70 - 99 mg/dL    BUN 16 6 - 20 mg/dL    CREATININE 1.17 0.70 - 1.20 mg/dL    Calcium 9.4 8.6 - 10.4 mg/dL    Sodium 134 (L) 135 - 144 mmol/L    Potassium 3.2 (L) 3.7 - 5.3 mmol/L    Chloride 96 (L) 98 - 107 mmol/L    CO2 21 20 - 31 mmol/L    Anion Gap 17 9 - 17 mmol/L    Alkaline Phosphatase 65 40 - 129 U/L    ALT 48 (H) 5 - 41 U/L    AST 38 <40 U/L    Total Bilirubin 0.94 0.3 - 1.2 mg/dL    Total Protein 7.5 6.4 - 8.3 g/dL    Albumin 4.4 3.5 - 5.2 g/dL    Albumin/Globulin Ratio 1.4 1.0 - 2.5    GFR Non-African American >60 >60 mL/min    GFR African American >60 >60 mL/min    GFR Comment         Troponin   Result Value Ref Range    Troponin, High Sensitivity 14 0 - 22 ng/L   Troponin   Result Value Ref Range    Troponin, High Sensitivity 15 0 - 22 ng/L   Brain Natriuretic Peptide   Result Value Ref Range    Pro-BNP 87 <300 pg/mL   Magnesium   Result Value Ref Range    Magnesium 2.0 1.6 - 2.6 mg/dL   Carboxyhemoglobin   Result Value Ref Range    Carboxyhemoglobin 1.5 0 - 5 %       IMPRESSION/ ED Course: 51-year-old male presenting with chest pain, shortness of breath. Patient ill-appearing on initial evaluation but vital signs within normal limits. Work-up largely unremarkable with negative troponin, normal EKG, negative chest x-ray, unremarkable CBC and BMP. 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 Carboxyhemoglobin level 1.5. CT chest was performed which not show any evidence of pulmonary embolism or other acute intrathoracic pathology.   Patient to be placed in observation status for stress test.    RADIOLOGY:  XR CHEST PORTABLE    Result Date: 3/26/2022  EXAM: XR Chest, 1 View EXAM DATE/TIME: 3/26/2022 9:32 pm CLINICAL HISTORY: ORDERING SYSTEM PROVIDED  chest pain  TECHNOLOGIST PROVIDED HISTORY:  chest pain  Reason for Exam: chest pain, nausea TECHNIQUE: Frontal view of the chest. COMPARISON: 08/09/2020 FINDINGS: Lungs:  No acute findings. No consolidation. Pleural space:  No acute findings. No pneumothorax. Heart:  No acute findings. No cardiomegaly. Mediastinum:  No acute findings. Bones/joints:  No acute findings. No acute findings in the chest.     CT CHEST PULMONARY EMBOLISM W CONTRAST    Result Date: 3/26/2022  EXAMINATION: CTA OF THE CHEST 3/26/2022 8:10 pm TECHNIQUE: CTA of the chest was performed after the administration of intravenous contrast.  Multiplanar reformatted images are provided for review. MIP images are provided for review. Dose modulation, iterative reconstruction, and/or weight based adjustment of the mA/kV was utilized to reduce the radiation dose to as low as reasonably achievable. COMPARISON: Chest x-ray dated March 26, 2022, prior CT PA dated August 9, 2020 HISTORY: ORDERING SYSTEM PROVIDED HISTORY: Severe chest pain, shortness of breath, diaphoretic TECHNOLOGIST PROVIDED HISTORY: Severe chest pain, shortness of breath, diaphoretic Decision Support Exception - unselect if not a suspected or confirmed emergency medical condition->Emergency Medical Condition (MA) FINDINGS: Pulmonary Arteries: Pulmonary arteries are adequately opacified for evaluation. No evidence of intraluminal filling defect to suggest pulmonary embolism. Main pulmonary artery is normal in caliber. Mediastinum: No evidence of mediastinal lymphadenopathy. Cardiomegaly is present. The heart and pericardium demonstrate no acute abnormality. There is no acute abnormality of the thoracic aorta. Lungs/pleura: Dependent changes are present within the lung bases. No focal area of consolidation, pneumothorax, or pleural effusion is present.   The trachea mainstem bronchi appear normal. Upper Abdomen: Images through the upper abdomen demonstrate diffuse hepatic steatosis. Soft Tissues/Bones: No acute bone or soft tissue abnormality. 1. No evidence of pulmonary embolism 2. Bibasilar dependent atelectasis 3. Cardiomegaly       EKG  EKG Interpretation    Interpreted by me    Rhythm: normal sinus   Rate: normal  Axis: normal  Ectopy: none  Conduction: normal  ST Segments: no acute change  T Waves: no acute change  Q Waves: none    Clinical Impression: no acute changes and normal EKG    All EKG's are interpreted by the Emergency Department Physician who either signs or Co-signs this chart in the absence of a cardiologist.      CONSULTS:  None    CRITICAL CARE:  Please see attending note    FINAL IMPRESSION      1. Chest pain, unspecified type          DISPOSITION / PLAN     DISPOSITION Admitted 03/26/2022 11:48:06 PM      PATIENT REFERRED TO:  No follow-up provider specified.     DISCHARGE MEDICATIONS:  New Prescriptions    No medications on file       Olvin Espinoza DO  Emergency Medicine Resident    (Please note that portions of thisnote were completed with a voice recognition program.  Efforts were made to edit the dictations but occasionally words are mis-transcribed.)        Olvin Espinoza DO  Resident  03/27/22 0002

## 2022-06-10 NOTE — PROGRESS NOTES
Transition of Care Plan:     RUR: 17% low  Disposition: Home with follow up appointments  Follow up appointments: PCP scheduled and placed on AVS; Cardiology  DME needed:New wheelchair and rolling walker from [employer] to be delivered to her home after discharge  Transportation at Discharge: Spouse will transport  Gregoria Gomez or means to access home: Spouse has access to home    IM Medicare letter:2nd IMM letter given to patient, signed and copy placed on medical record  Caregiver Contact: Spouse, Davidvish Woods      CM acknowledges discharge. Spouse to provide transport. [employer] to deliver RW and Wheelchair. No further CM needs identified.     Delaney Nguyen, MSN, RN  Care Manager       [phone]

## 2022-07-26 NOTE — PROGRESS NOTES
Bedside and Verbal shift change report (oncoming nurse) by Anthony Lemus RN (offgoing nurse). Report included the following information SBAR, Kardex, Intake/Output, MAR and Recent Results. Cephalexin Counseling: I counseled the patient regarding use of cephalexin as an antibiotic for prophylactic and/or therapeutic purposes. Cephalexin (commonly prescribed under brand name Keflex) is a cephalosporin antibiotic which is active against numerous classes of bacteria, including most skin bacteria. Side effects may include nausea, diarrhea, gastrointestinal upset, rash, hives, yeast infections, and in rare cases, hepatitis, kidney disease, seizures, fever, confusion, neurologic symptoms, and others. Patients with severe allergies to penicillin medications are cautioned that there is about a 10% incidence of cross-reactivity with cephalosporins. When possible, patients with penicillin allergies should use alternatives to cephalosporins for antibiotic therapy.

## 2022-12-07 NOTE — PROGRESS NOTES
Left upper chest ICD insertion site topical skin glue in place, no bleeding and hematoma. Pt. Denied pain, SOB and dizziness. Vital signs are stable. josh

## 2023-05-11 RX ORDER — FUROSEMIDE 40 MG/1
1 TABLET ORAL DAILY
COMMUNITY
Start: 2021-05-22

## 2023-05-11 RX ORDER — CLOPIDOGREL BISULFATE 75 MG/1
TABLET ORAL DAILY
COMMUNITY
Start: 2020-05-11

## 2023-05-11 RX ORDER — ASPIRIN 81 MG/1
TABLET ORAL DAILY
COMMUNITY

## 2023-05-11 RX ORDER — GABAPENTIN 300 MG/1
CAPSULE ORAL PRN
COMMUNITY

## 2023-05-11 RX ORDER — GUAIFENESIN 600 MG/1
TABLET, EXTENDED RELEASE ORAL 2 TIMES DAILY
COMMUNITY
Start: 2022-02-15

## 2023-05-11 RX ORDER — POTASSIUM CHLORIDE 20 MEQ/1
TABLET, EXTENDED RELEASE ORAL DAILY
COMMUNITY
Start: 2021-03-30

## 2023-05-11 RX ORDER — ATORVASTATIN CALCIUM 40 MG/1
40 TABLET, FILM COATED ORAL DAILY
COMMUNITY

## 2023-05-11 RX ORDER — CARVEDILOL 12.5 MG/1
TABLET ORAL 2 TIMES DAILY WITH MEALS
COMMUNITY
Start: 2021-05-22

## 2023-05-11 RX ORDER — TRAZODONE HYDROCHLORIDE 50 MG/1
100 TABLET ORAL NIGHTLY
COMMUNITY

## 2023-05-11 RX ORDER — ALBUTEROL SULFATE 90 UG/1
2 AEROSOL, METERED RESPIRATORY (INHALATION) EVERY 4 HOURS PRN
COMMUNITY
Start: 2022-02-15

## 2023-05-11 RX ORDER — ESCITALOPRAM OXALATE 5 MG/1
5 TABLET ORAL DAILY
COMMUNITY

## 2023-08-18 NOTE — CONSULTS
Consultation Note    NAME: Geo Gonzalez   :  1945   MRN:  [id number]     Date/Time:  3/29/2021 2:50 PM    I have been asked to see this patient by Dr. Kike Farias  for advice/opinion re: BONNIE/hyponatremia. Assessment :    Plan:  BONNIE  Hyponatremia    Acute on chronic systolic HF (EF 30%)  Volume overload    PAD (left BKA, right AKA) Creatinine on the rise, now 1.6 with needed diuresis    CT (3/28): Kidneys: Bilateral nonobstructing nephrolithiasis. Sodium deanne 126, now stable at 129    On Lasix 40 mg IV diego (home dose 20 mg daily); home hct and entresto on hold    Will check urine sediment, check urine protein:creatinine ratio and urine eos    Mildly high CK    Likely cardiorenal syndrome       Subjective:   CHIEF COMPLAINT: bonnie    HISTORY OF PRESENT ILLNESS:     Benito is a 68 y.o.   female who has a history of the below. Came to the ER with a week or so of not feeling well. Nausea. SOB and edema. Denies recent abx (bactrim on her med list). No nsaids. She has 2 nephews on HD. Making plenty of urine and feels like she is emptying her bladder.     Past Medical History:   Diagnosis Date    Arthritis     was in legs    Hypertension     Other ill-defined conditions(799.89)     extremety vascular disease    PAD (peripheral artery disease) ([facility] [address]     PVD (peripheral vascular disease) ([facility] [address]       Past Surgical History:   Procedure Laterality Date    COLONOSCOPY N/A 2018    COLONOS[facility] at [facility] ENDOSCOPY    HX AMPUTATION      Right Above Knee Amputation    HX AMPUTATION  2011    Left Below Knee Amputation    HX ORTHOPAEDIC  12     Debridement Left BKA Stump and Placement of Wound VA    HX OTHER SURGICAL  10/24/12    SPLIT THICKNESS SKIN GRAFT FROM RIGHT THIGH TO BELOW KNEE AMPUTATION STUMP (to wound left BKA stump    IA VEIN BYPASS GRAFT,FEM-TIBIAL  3/04/2011    Left femoral post. tibial bypass with vein     Social History     Tobacco Use    Smoking status: Former Smoker     Quit date: 2008     Years since quittin.7    Smokeless tobacco: Never Used   Substance Use Topics    Alcohol use: No      Family History   Problem Relation Age of Onset    Hypertension Mother     Hypertension Father     Diabetes Sister     Diabetes Brother       Allergies   Allergen Reactions    Percocet [Oxycodone-Acetaminophen] Nausea and Vomiting      Prior to Admission medications    Medication Sig Start Date End Date Taking? Authorizing Provider   carvediloL (Coreg) 12.5 mg tablet Take 12.5 mg by mouth two (2) times daily (with meals). Yes Provider, Historical   gabapentin (NEURONTIN) 300 mg capsule Take 300 mg by mouth as needed for Pain. Yes Provider, Historical   hydroCHLOROthiazide (HYDRODIURIL) 25 mg tablet Take 25 mg by mouth daily. Yes Provider, Historical   furosemide (LASIX) 20 mg tablet Take 20 mg by mouth daily. Yes Provider, Historical   sacubitriL-valsartan (Entresto) 24-26 mg tablet Take 1 Tab by mouth two (2) times a day. Yes Other, MD Stephani   aspirin delayed-release 81 mg tablet Take  by mouth daily. Yes Other, MD Stephani   trimethoprim-sulfamethoxazole (BACTRIM DS, SEPTRA DS) 160-800 mg per tablet Take 1 Tab by mouth two (2) times a day. Yes Other, MD Stephani   clopidogreL (PLAVIX) 75 mg tab Take 1 Tab by mouth daily. 20  Yes Laine Lawrence MD   atorvastatin (Lipitor) 40 mg tablet Take 40 mg by mouth daily. Yes Other, MD Stephani   potassium chloride (K-DUR) 20 mEq tablet Take 1 Tab by mouth daily.  3/14/11  Yes Oh Smalls MD     REVIEW OF SYSTEMS:     []  Unable to obtain reliable ROS due to  [] mental status  [] sedated   [] intubated   [x] Total of 12 systems reviewed as follows:  Constitutional: negative fever, negative chills, negative weight loss  Eyes:   negative visual changes  ENT:   negative sore throat, tongue or lip swelling  Respiratory:  negative cough, + dyspnea  Cards:  negative for chest pain, palpitations,+  lower extremity edema  GI:   negative for nausea, vomiting, diarrhea, and abdominal pain  :  negative for frequency, dysuria  Integument:  negative for rash and pruritus  Heme:  negative for easy bruising and gum/nose bleeding  Musculoskel: negative for myalgias,  back pain and muscle weakness  Neuro:  negative for headaches, dizziness, vertigo  Psych:  negative for feelings of anxiety, depression   Travel?: none    Objective:   VITALS:    Visit Vitals  BP (!) 99/57   Pulse 76   Temp 97.7 °F (36.5 °C)   Resp 18   Ht 4' 2\" (1.27 m)   Wt 61.2 kg (134 lb 14.7 oz)   SpO2 96%   BMI 37.94 kg/m²     PHYSICAL EXAM:  Gen:  []  WD []  WN  [] cachectic []  thin [x]  obese []  disheveled             []  ill apearing  []   Critical  []   Chronic    [x]  No acute distress    HEENT:   [x] NC/AT/PERRLA/EOMI    [] pink conjunctivae      [] pale conjunctivae                  PERRL  [] yes  [] no      [] moist mucosa    [] dry mucosa    hearing intact to voice [x] yes  [] No                 NECK:   supple [x] yes  [] no        masses [] yes  [] No               thyroid  []  non tender  []  tender    RESP:   [] CTA bilaterally/no wheezing/rhonchi/rales/crackles    [] rhonchi bilaterally - no dullness  [] wheezing   [] rhonchi   [x] crackles     use of accessory muscles [] yes [x] no    CARD:   [x]  regular rate and rhythm/No murmurs/rubs/gallops    murmur  [] yes ()  [] no      Rubs  [] yes  [] no       Gallops [] yes  [] no    Rate []  regular  []  irregular        carotid bruits  [] Right  []  Left                 LE edema - stump [x] yes  [] no           JVP  []  yes   []  no    ABD:    [x] soft/non distended/non tender/+bowel sounds/no HSM    []  Rigid    tenderness [] yes [] no   Liver enlargement  []  yes []  no                Spleen enlargement  []  yes []  no     distended []  yes [] no     bowel sound  [] hypoactive   [] hyperactive    LYMPH:    Neck []  yes [x]  no       Axillae []  yes []  no    SKIN: Rashes []  yes   [x]  no    Ulcers []  yes   []  no               [] tight to palpitation    skin turgor []  good  [] poor  [] decreased               Cyanosis/clubbing []  yes []  no    NEUR:   [x] cranial nerves II-XII grossly intact       [] Cranial nerves deficit                 []  facial droop    []  slurred speech   [] aphasic     [] Strength normal     []  weakness  []  LUE  []   RUE/ []  LLE  []   RLE    follows commands  [x]  yes []  no           PSYCH:   insight [] poor [] good   Alert and Oriented to  [x] person  [x] place  [x]  time                    [] depressed [] anxious [] agitated  [] lethargic [] stuporous  [] sedated     LAB DATA REVIEWED:    Recent Labs     03/29/21 0232 03/27/21 1919   WBC 4.2 4.2   HGB 10.4* 10.9*   HCT 30.2* 32.5*   * 122*     Recent Labs     03/29/21 0232 03/28/21  1152 03/27/21 1919   * 129* 126*   K 4.0 4.1 5.7*   CL 97 97 96*   CO2 26 25 27   BUN 30* 24* 21*   CREA 1.64* 1.39* 1.30*   * 130* 73   CA 8.1* 8.6 8.2*   MG 1.7  --   --      Recent Labs     03/29/21 0232 03/27/21 1919   ALT 33 37   AP 60 66   TBILI 0.3 0.5   ALB 3.2* 3.4*   GLOB 3.2 3.3     No results for input(s): INR, PTP, APTT, INREXT in the last 72 hours. Recent Labs     03/29/21 0232   TIBC 194*   PSAT 16*   FERR 144      No results for input(s): PH, PCO2, PO2 in the last 72 hours. Recent Labs     03/29/21 0232 03/27/21 1919   *  --    CKMB  --  18.9*     Lab Results   Component Value Date/Time    Glucose (POC) 152 (H) 05/08/2020 04:18 PM    Glucose (POC) 82 07/20/2012 08:24 AM    Glucose (POC) 113 (H) 07/19/2012 09:00 PM    Glucose (POC) 105 07/19/2012 04:08 PM    Glucose (POC) 83 07/19/2012 11:51 AM       Procedures: see electronic medical records for all procedures/Xrays and details which were not copied into this note but were reviewed prior to creation of Plan. ________________________________________________________________________       ___________________________________________________  Consulting Physician:  Kami Budd, MD Pt with decreased adl status and functional mobility.

## 2024-06-13 NOTE — Clinical Note
Please review and assist pt w/ scheduling.   Safe sheath inserted over the wire. 8fr sheath/ aspirated and flushed.

## (undated) DEVICE — PRESSURE MONITORING SET: Brand: TRUWAVE

## (undated) DEVICE — PACK PROCEDURE SURG HRT CATH

## (undated) DEVICE — Z DISCONTINUED PER MEDLINE LINE GAS SAMPLING O2/CO2 LNG AD 13 FT NSL W/ TBNG FILTERLINE

## (undated) DEVICE — BASIN EMSIS 16OZ GRAPHITE PLAS KID SHP MOLD GRAD FOR ORAL

## (undated) DEVICE — TOWEL 4 PLY TISS 19X30 SUE WHT

## (undated) DEVICE — TRAY,IRRIGATION,PISTON SYRINGE,60ML,STRL: Brand: MEDLINE

## (undated) DEVICE — CATH IV AUTOGRD BC PNK 20GA 25 -- INSYTE

## (undated) DEVICE — ADHESIVE SKIN CLOSURE 4X22 CM PREMIERPRO EXOFINFUSION DISP

## (undated) DEVICE — Device

## (undated) DEVICE — NC TREK CORONARY DILATATION CATHETER 2.5 MM X 12 MM / RAPID-EXCHANGE: Brand: NC TREK

## (undated) DEVICE — KIT ACCS INTRO 4FR L10CM NDL 21GA L7CM GWIRE L40CM

## (undated) DEVICE — 3M™ TEGADERM™ TRANSPARENT FILM DRESSING FRAME STYLE, 1626W, 4 IN X 4-3/4 IN (10 CM X 12 CM), 50/CT 4CT/CASE: Brand: 3M™ TEGADERM™

## (undated) DEVICE — TR BAND RADIAL ARTERY COMPRESSION DEVICE: Brand: TR BAND

## (undated) DEVICE — SNARE ENDOSCP M L240CM W27MM SHTH DIA2.4MM CHN 2.8MM OVL

## (undated) DEVICE — SET ADMIN 16ML TBNG L100IN 2 Y INJ SITE IV PIGGY BK DISP

## (undated) DEVICE — SUTURE VCRL 2-0 L27IN ABSRB UD PS-2 L19MM 1/2 CIR J428H

## (undated) DEVICE — GUIDEWIRE VASC L260CM 0.035IN J TIP L3MM PTFE FIX COR NAMIC

## (undated) DEVICE — SET ADMIN IV PMP 20GTT 117IN -- 2 NDLS INJ SITE

## (undated) DEVICE — ANGIOGRAPHY KIT CUST [K0910930B] [MERIT MEDICAL SYSTEMS INC]

## (undated) DEVICE — CATH GUID .056IN 6FR 150CM -- GUIDELINER V3

## (undated) DEVICE — ADHESIVE SKIN CLOSURE HI VISC MIC 0.5 CC PREMIERPRO EXOFIN

## (undated) DEVICE — 1200 GUARD II KIT W/5MM TUBE W/O VAC TUBE: Brand: GUARDIAN

## (undated) DEVICE — TREK CORONARY DILATATION CATHETER 2.50 MM X 12 MM / RAPID-EXCHANGE: Brand: TREK

## (undated) DEVICE — INTRO PEELWY HEMVLV 7F 13CM -- SHRT PRELUDE SNAP

## (undated) DEVICE — KENDALL RADIOLUCENT FOAM MONITORING ELECTRODE RECTANGULAR SHAPE: Brand: KENDALL

## (undated) DEVICE — CATHETER ANGIO JR4 AD 5 FRX100 CM 25 CM PERFORMA

## (undated) DEVICE — SUTURE PERMAHAND SZ 2-0 L18IN NONABSORBABLE BLK L26MM PS 1588H

## (undated) DEVICE — INTRODUCER SHEATH SAFESHEATH 8FR 13CM SPLITTABLE DILATOR

## (undated) DEVICE — SUTURE COAT VCRL SZ 4-0 L18IN ABSRB UD L19MM PS-2 1/2 CIR J496G

## (undated) DEVICE — CATH GUID COR EB35 6FR 100CM -- LAUNCHER

## (undated) DEVICE — CONTAINER SPEC 20 ML LID NEUT BUFF FORMALIN 10 % POLYPR STS

## (undated) DEVICE — SOLIDIFIER MEDC 1200ML -- CONVERT TO 356117

## (undated) DEVICE — 3M™ IOBAN™ 2 ANTIMICROBIAL INCISE DRAPE 6650EZ: Brand: IOBAN™ 2

## (undated) DEVICE — CATHETER ETER ANGIO L110CM OD5FR ID046IN L75CM 038IN 145DEG CARD

## (undated) DEVICE — SYR 10ML LUER LOK 1/5ML GRAD --

## (undated) DEVICE — SUT SLK 0 30IN SH BLK --

## (undated) DEVICE — TUBING PRSS MON L6IN PVC M FEM CONN

## (undated) DEVICE — SYR ART 700 CLEAR MARK 7 -- ARTERION

## (undated) DEVICE — NC TREK CORONARY DILATATION CATHETER 2.75 MM X 15 MM / RAPID-EXCHANGE: Brand: NC TREK

## (undated) DEVICE — SYRINGE ANGIO 10 CC BRL STD PRNT POLYCARB LT BLU MEDALLION

## (undated) DEVICE — Device: Brand: PROWATER

## (undated) DEVICE — TRAP SUC MUCOUS 70ML -- MEDICHOICE MEDLINE

## (undated) DEVICE — NEONATAL-ADULT SPO2 SENSOR: Brand: NELLCOR

## (undated) DEVICE — GLIDESHEATH SLENDER ACCESS KIT: Brand: GLIDESHEATH SLENDER

## (undated) DEVICE — REM POLYHESIVE ADULT PATIENT RETURN ELECTRODE: Brand: VALLEYLAB

## (undated) DEVICE — PACER PACK: Brand: MEDLINE INDUSTRIES, INC.

## (undated) DEVICE — STRAINER URIN CALC RNL MSH -- CONVERT TO ITEM 357634

## (undated) DEVICE — CATH GUID COR JR4.0 6FR 100CM -- LAUNCHER

## (undated) DEVICE — HI-TORQUE VERSACORE FLOPPY GUIDE WIRE SYSTEM 145 CM: Brand: HI-TORQUE VERSACORE

## (undated) DEVICE — SYR 3ML LL TIP 1/10ML GRAD --

## (undated) DEVICE — CATHETER ANGIO JL3.5 AD 5 FRX100 CM PERFORMA

## (undated) DEVICE — NEEDLE HYPO 18GA L1.5IN PNK S STL HUB POLYPR SHLD REG BVL

## (undated) DEVICE — MEDI-TRACE CADENCE ADULT, DEFIBRILLATION ELECTRODE -RTS  (10 PR/PK) - PHYSIO-CONTROL: Brand: MEDI-TRACE CADENCE